# Patient Record
Sex: MALE | Race: WHITE | NOT HISPANIC OR LATINO | Employment: OTHER | URBAN - METROPOLITAN AREA
[De-identification: names, ages, dates, MRNs, and addresses within clinical notes are randomized per-mention and may not be internally consistent; named-entity substitution may affect disease eponyms.]

---

## 2017-08-23 ENCOUNTER — TRANSCRIBE ORDERS (OUTPATIENT)
Dept: ADMINISTRATIVE | Facility: HOSPITAL | Age: 67
End: 2017-08-23

## 2017-08-23 DIAGNOSIS — Z72.0 TOBACCO ABUSE: Primary | ICD-10-CM

## 2017-08-31 ENCOUNTER — HOSPITAL ENCOUNTER (OUTPATIENT)
Dept: RADIOLOGY | Age: 67
Discharge: HOME | End: 2017-08-31
Payer: COMMERCIAL

## 2017-08-31 DIAGNOSIS — Z72.0 TOBACCO ABUSE: ICD-10-CM

## 2017-08-31 PROCEDURE — 76775 US EXAM ABDO BACK WALL LIM: CPT

## 2018-08-01 ENCOUNTER — OFFICE VISIT (OUTPATIENT)
Dept: FAMILY MEDICINE CLINIC | Facility: CLINIC | Age: 68
End: 2018-08-01
Payer: MEDICARE

## 2018-08-01 VITALS
RESPIRATION RATE: 18 BRPM | WEIGHT: 202.2 LBS | SYSTOLIC BLOOD PRESSURE: 140 MMHG | TEMPERATURE: 96.2 F | DIASTOLIC BLOOD PRESSURE: 90 MMHG | HEART RATE: 72 BPM | HEIGHT: 71 IN | BODY MASS INDEX: 28.31 KG/M2

## 2018-08-01 DIAGNOSIS — E78.5 HYPERLIPIDEMIA, UNSPECIFIED HYPERLIPIDEMIA TYPE: ICD-10-CM

## 2018-08-01 DIAGNOSIS — R73.9 HYPERGLYCEMIA: ICD-10-CM

## 2018-08-01 DIAGNOSIS — J30.9 ALLERGIC RHINITIS, UNSPECIFIED SEASONALITY, UNSPECIFIED TRIGGER: Primary | Chronic | ICD-10-CM

## 2018-08-01 LAB
EST. AVERAGE GLUCOSE BLD GHB EST-MCNC: 103 MG/DL
HBA1C MFR BLD: 5.2 % (ref 4.2–6.3)

## 2018-08-01 PROCEDURE — G0403 EKG FOR INITIAL PREVENT EXAM: HCPCS | Performed by: FAMILY MEDICINE

## 2018-08-01 PROCEDURE — 83036 HEMOGLOBIN GLYCOSYLATED A1C: CPT | Performed by: FAMILY MEDICINE

## 2018-08-01 PROCEDURE — 36415 COLL VENOUS BLD VENIPUNCTURE: CPT | Performed by: FAMILY MEDICINE

## 2018-08-01 PROCEDURE — G0402 INITIAL PREVENTIVE EXAM: HCPCS | Performed by: FAMILY MEDICINE

## 2018-08-01 PROCEDURE — 99214 OFFICE O/P EST MOD 30 MIN: CPT | Performed by: FAMILY MEDICINE

## 2018-08-01 RX ORDER — LOVASTATIN 40 MG/1
40 TABLET ORAL DAILY
Refills: 3 | COMMUNITY
Start: 2018-06-07 | End: 2018-11-28 | Stop reason: SDUPTHER

## 2018-08-01 NOTE — PROGRESS NOTES
Medicare Annual Wellness Visit  [facility] - [facility]  NAME: Livier Vaz SEX: male : 1950    HPI:  Livier Vaz is a 78 y o  male who presents to clinic today for Annual Wellness Visit    Last Medicare wellness visit information was reviewed, patient interviewed and updates made to the record today if appropriate  PATIENT CARE TEAM:  Patient Care Team:  Elvia Nicolas MD as PCP - General    CURRENT OUTSIDE HEALTH CARE PROVIDERS: eyes/ dental/ skin     PAST MEDICAL HISTORY:  No past medical history on file  PAST SURGICAL HISTORY:  Past Surgical History:   Procedure Laterality Date    COLONOSCOPY W/ POLYPECTOMY  2004    HAND SURGERY Bilateral 2008    DUPUYTRENS CONTRACTURE SURGICAL RELEASE       PROBLEM LIST:  Patient Active Problem List   Diagnosis    HLD (hyperlipidemia)    Allergic rhinitis    Hyperglycemia       FAMILY HISTORY:  Family History   Problem Relation Age of Onset    Coronary artery disease Mother     Cirrhosis Father     Diabetes type II Family     Heart disease Family        SOCIAL HISTORY:  Livier Vaz  reports that he has been smoking  He uses smokeless tobacco  He reports that he drinks alcohol  He reports that he does not use drugs  ALLERGIES:  No Known Allergies    CURRENT MEDICATIONS:  Current Outpatient Prescriptions   Medication Sig Dispense Refill    lovastatin (MEVACOR) 40 MG tablet Take 40 mg by mouth daily  3     No current facility-administered medications for this visit  IMMUNIZATIONS:  Immunization History   Administered Date(s) Administered    Pneumococcal Conjugate 13-Valent 08/15/2016    Pneumococcal Polysaccharide PPV23 2017       HEALTH MAINTENANCE:  Depression Screen performed (PHQ2/9 as appropriate)   Colon Cancer/ Breast Cancer/ Cevical Cancer/ Prostate Cancer screenings reviewed           PATIENT HEALTH RISK ASSESSMENT (HRA):  Cognitive assessment performed  AAA assessment performed as indicated  Osteoporosis screening reviewed  Fall risk assessment performed (Getup and go tool)  Diet/Exercise and Fluid Intake reviewed as appropriate  ADL / urinary continence reviewed  ADVANCED DIRECTIVE DISCUSSED/ DOCUMENTS PROVIDED AS APPROPRIATE  VITALS:  /90 (BP Location: Left arm, Patient Position: Sitting, Cuff Size: Adult)   Pulse 72   Temp (!) 96 2 °F (35 7 °C)   Resp 18   Ht 5' 11 26" (1 81 m)   Wt 91 7 kg (202 lb 3 2 oz)   BMI 28 00 kg/m²         ASSESSMENT/PLAN:  AWV / ADULT PATIENT    MEDICARE WELLNESS COUNSELING/DISCUSSION:  See Patient Plan      Do Littlejohn MD  [id number] [facility]

## 2018-08-01 NOTE — PROGRESS NOTES
Assessment/Plan:    No problem-specific Assessment & Plan notes found for this encounter  Diagnoses and all orders for this visit:    Allergic rhinitis, unspecified seasonality, unspecified trigger    Hyperlipidemia, unspecified hyperlipidemia type  -     Hemoglobin A1C    Hyperglycemia  -     Hemoglobin A1C  -     POCT ECG    Other orders  -     lovastatin (MEVACOR) 40 MG tablet; Take 40 mg by mouth daily          Subjective:      Patient ID: Cici Roberto is a 78 y o  male  Chavezjannet Cloud comes for his annual visit  Since last time no hospital stays or emergency visits  Medications are the same and having no side effects  No new concerns    Patient also here for Welcome to Medicare evaluation  The following portions of the patient's history were reviewed and updated as appropriate: allergies, current medications, past family history, past medical history, past social history, past surgical history and problem list     Review of Systems   Constitutional: Negative for activity change and appetite change  HENT: Negative for trouble swallowing  Eyes: Negative for visual disturbance  Respiratory: Negative for cough and shortness of breath  Cardiovascular: Negative for chest pain, palpitations and leg swelling  Gastrointestinal: Negative for abdominal pain and blood in stool  Endocrine: Negative for polyuria  Genitourinary: Negative for difficulty urinating and hematuria  Skin: Negative for rash  Neurological: Negative for dizziness  Psychiatric/Behavioral: Negative for behavioral problems  Objective:  Vitals:    08/01/18 0824   BP: 140/90   BP Location: Left arm   Patient Position: Sitting   Cuff Size: Adult   Pulse: 72   Resp: 18   Temp: (!) 96 2 °F (35 7 °C)   Weight: 91 7 kg (202 lb 3 2 oz)   Height: 5' 11 26" (1 81 m)      Physical Exam   Constitutional: He appears well-developed and well-nourished  HENT:   Head: Normocephalic and atraumatic     Eyes: Conjunctivae are normal    Neck: Neck supple  No thyromegaly present  Cardiovascular: Normal rate, regular rhythm, normal heart sounds and intact distal pulses  No murmur heard  Pulmonary/Chest: Effort normal and breath sounds normal  No respiratory distress  Musculoskeletal: He exhibits no edema  Lymphadenopathy:     He has no cervical adenopathy  Skin: Skin is warm and dry  Psychiatric: He has a normal mood and affect   His behavior is normal

## 2018-08-01 NOTE — PATIENT INSTRUCTIONS
IF YOU HAVE NEVER HAD A TDAP SHOT (TETANUS/DIPHTHERIA/PERTUSSIS)  TALK TO YOUR PHARMACIST ABOUT GETTING ONE : GOOD FOR 10 YRS:ESPECIALLY IMPORTATN IF YOU HAVE YOUNG CHILDREN OR GRANDCHILDREN    GET A YEARLY FLU SHOT IN THE FALL : IF OVER 65 GET "HIGH DOSE"     ASK PHARMACIST ABOUT "SHINGRIX" THE NEW SHINGLES VACCINE IF OVER AGE 50    Continue your excellent exercise level and maintain current weight

## 2018-09-14 ENCOUNTER — OFFICE VISIT (OUTPATIENT)
Dept: FAMILY MEDICINE CLINIC | Facility: CLINIC | Age: 68
End: 2018-09-14
Payer: MEDICARE

## 2018-09-14 VITALS
HEIGHT: 71 IN | DIASTOLIC BLOOD PRESSURE: 80 MMHG | RESPIRATION RATE: 20 BRPM | SYSTOLIC BLOOD PRESSURE: 150 MMHG | WEIGHT: 205 LBS | TEMPERATURE: 97 F | HEART RATE: 80 BPM | BODY MASS INDEX: 28.7 KG/M2

## 2018-09-14 DIAGNOSIS — R05.9 COUGH: Primary | ICD-10-CM

## 2018-09-14 PROCEDURE — 99213 OFFICE O/P EST LOW 20 MIN: CPT | Performed by: FAMILY MEDICINE

## 2018-09-14 RX ORDER — GUAIFENESIN AND CODEINE PHOSPHATE 100; 10 MG/5ML; MG/5ML
SOLUTION ORAL
Qty: 120 ML | Refills: 0 | Status: SHIPPED | OUTPATIENT
Start: 2018-09-14 | End: 2019-08-13 | Stop reason: ALTCHOICE

## 2018-09-14 RX ORDER — AZITHROMYCIN 250 MG/1
250 TABLET, FILM COATED ORAL EVERY 24 HOURS
Qty: 6 TABLET | Refills: 0 | Status: SHIPPED | OUTPATIENT
Start: 2018-09-14 | End: 2018-09-19

## 2018-09-14 RX ORDER — ASPIRIN 81 MG/1
81 TABLET ORAL DAILY
COMMUNITY
End: 2019-08-13

## 2018-09-14 NOTE — PATIENT INSTRUCTIONS
Use a cough medicine for 3-4 days  If your symptoms worsen go right on the antibiotic  If no improvement in 3-4 days start the antibiotic as well

## 2018-09-14 NOTE — PROGRESS NOTES
Assessment/Plan:    No problem-specific Assessment & Plan notes found for this encounter  Diagnoses and all orders for this visit:    Cough  Comments:  HPI  Orders:  -     guaifenesin-codeine (GUAIFENESIN AC) 100-10 MG/5ML liquid; 1-2 TSP Q6HR PRN COUGH  -     azithromycin (ZITHROMAX) 250 mg tablet; Take 1 tablet (250 mg total) by mouth every 24 hours for 5 days TAKE 2 ON THE FIRST DAY THEN ONE DAILY THERE AFTER    Other orders  -     aspirin (ECOTRIN LOW STRENGTH) 81 mg EC tablet; Take 81 mg by mouth daily          Subjective:      Patient ID: Vidhi Aceves is a 80 y o  male  Christninfa Mac comes for discussion of cough  Duration approximately 10 days  Some phlegm is noted also has had some epistaxis intermittently  Some left-sided lower chest pain from the cough  He is not a consistent smoker  Not known to have seasonal allergies all no wheeze reported no fevers or chills able to do routine physical activity  The following portions of the patient's history were reviewed and updated as appropriate: allergies, current medications, past family history, past medical history, past social history, past surgical history and problem list     Review of Systems   Respiratory: Positive for cough and chest tightness  Negative for shortness of breath and wheezing  Gastrointestinal: Negative for diarrhea  Skin: Negative for rash           Objective:  Vitals:    09/14/18 0802   BP: 150/80   BP Location: Left arm   Patient Position: Sitting   Cuff Size: Standard   Pulse: 80   Resp: 20   Temp: (!) 97 °F (36 1 °C)   TempSrc: Temporal   Weight: 93 kg (205 lb)   Height: 5' 11" (1 803 m)      Physical Exam

## 2018-11-28 DIAGNOSIS — E78.5 HYPERLIPIDEMIA, UNSPECIFIED HYPERLIPIDEMIA TYPE: Primary | ICD-10-CM

## 2018-11-28 RX ORDER — LOVASTATIN 40 MG/1
40 TABLET ORAL DAILY
Qty: 90 TABLET | Refills: 3 | Status: SHIPPED | OUTPATIENT
Start: 2018-11-28 | End: 2019-12-06 | Stop reason: SDUPTHER

## 2019-08-13 ENCOUNTER — OFFICE VISIT (OUTPATIENT)
Dept: FAMILY MEDICINE CLINIC | Facility: CLINIC | Age: 69
End: 2019-08-13
Payer: MEDICARE

## 2019-08-13 VITALS
TEMPERATURE: 97.3 F | HEART RATE: 72 BPM | OXYGEN SATURATION: 98 % | BODY MASS INDEX: 28 KG/M2 | DIASTOLIC BLOOD PRESSURE: 90 MMHG | WEIGHT: 200 LBS | SYSTOLIC BLOOD PRESSURE: 130 MMHG | HEIGHT: 71 IN

## 2019-08-13 DIAGNOSIS — E78.5 HYPERLIPIDEMIA, UNSPECIFIED HYPERLIPIDEMIA TYPE: ICD-10-CM

## 2019-08-13 DIAGNOSIS — J30.9 ALLERGIC RHINITIS, UNSPECIFIED SEASONALITY, UNSPECIFIED TRIGGER: Chronic | ICD-10-CM

## 2019-08-13 DIAGNOSIS — Z12.11 ENCOUNTER FOR SCREENING COLONOSCOPY: Primary | ICD-10-CM

## 2019-08-13 DIAGNOSIS — R73.9 HYPERGLYCEMIA: ICD-10-CM

## 2019-08-13 PROBLEM — Z00.00 MEDICARE ANNUAL WELLNESS VISIT, SUBSEQUENT: Chronic | Status: ACTIVE | Noted: 2019-08-13

## 2019-08-13 LAB
EST. AVERAGE GLUCOSE BLD GHB EST-MCNC: 103 MG/DL
HBA1C MFR BLD: 5.2 % (ref 4.2–6.3)

## 2019-08-13 PROCEDURE — 99214 OFFICE O/P EST MOD 30 MIN: CPT | Performed by: FAMILY MEDICINE

## 2019-08-13 PROCEDURE — 36415 COLL VENOUS BLD VENIPUNCTURE: CPT | Performed by: FAMILY MEDICINE

## 2019-08-13 PROCEDURE — 83036 HEMOGLOBIN GLYCOSYLATED A1C: CPT | Performed by: FAMILY MEDICINE

## 2019-08-13 PROCEDURE — G0439 PPPS, SUBSEQ VISIT: HCPCS | Performed by: FAMILY MEDICINE

## 2019-08-13 NOTE — PATIENT INSTRUCTIONS
WE RECOMMEND GETTING THE 2- DOSE SHINGLES VACCINE ([address]) FROM YOUR PHARMACY  CHECK WITH THEM ON THE COST  YOU SHOULD HAVE THIS IF OVER AGE 48, EVEN IF YOU HAVE HAD THE ORIGINAL VACCINE (ZOSTRIX)  Call Dr Gamal Tyler : [phone] for colon exam this year   Consider eliminating the aspirin as the risk benefit ratio probably favors stopping it  Obesity   AMBULATORY CARE:   Obesity  is when your body mass index (BMI) is greater than 30  Your healthcare provider will use your height and weight to measure your BMI  The risks of obesity include  many health problems, such as injuries or physical disability  You may need tests to check for the following:  · Diabetes     · High blood pressure or high cholesterol     · Heart disease     · Gallbladder or liver disease     · Cancer of the colon, breast, prostate, liver, or kidney     · Sleep apnea     · Arthritis or gout  Seek care immediately if:   · You have a severe headache, confusion, or difficulty speaking  · You have weakness on one side of your body  · You have chest pain, sweating, or shortness of breath  Contact your healthcare provider if:   · You have symptoms of gallbladder or liver disease, such as pain in your upper abdomen  · You have knee or hip pain and discomfort while walking  · You have symptoms of diabetes, such as intense hunger and thirst, and frequent urination  · You have symptoms of sleep apnea, such as snoring or daytime sleepiness  · You have questions or concerns about your condition or care  Treatment for obesity  focuses on helping you lose weight to improve your health  Even a small decrease in BMI can reduce the risk for many health problems  Your healthcare provider will help you set a weight-loss goal   · Lifestyle changes  are the first step in treating obesity  These include making healthy food choices and getting regular physical activity   Your healthcare provider may suggest a weight-loss program that involves coaching, education, and therapy  · Medicine  may help you lose weight when it is used with a healthy diet and physical activity  · Surgery  can help you lose weight if you are very obese and have other health problems  There are several types of weight-loss surgery  Ask your healthcare provider for more information  Be successful losing weight:   · Set small, realistic goals  An example of a small goal is to walk for 20 minutes 5 days a week  Anther goal is to lose 5% of your body weight  · Tell friends, family members, and coworkers about your goals  and ask for their support  Ask a friend to lose weight with you, or join a weight-loss support group  · Identify foods or triggers that may cause you to overeat , and find ways to avoid them  Remove tempting high-calorie foods from your home and workplace  Place a bowl of fresh fruit on your kitchen counter  If stress causes you to eat, then find other ways to cope with stress  · Keep a diary to track what you eat and drink  Also write down how many minutes of physical activity you do each day  Weigh yourself once a week and record it in your diary  Eating changes: You will need to eat 500 to 1,000 fewer calories each day than you currently eat to lose 1 to 2 pounds a week  The following changes will help you cut calories:  · Eat smaller portions  Use small plates, no larger than 9 inches in diameter  Fill your plate half full of fruits and vegetables  Measure your food using measuring cups until you know what a serving size looks like  · Eat 3 meals and 1 or 2 snacks each day  Plan your meals in advance  Natalia More and eat at home most of the time  Eat slowly  · Eat fruits and vegetables at every meal   They are low in calories and high in fiber, which makes you feel full  Do not add butter, margarine, or cream sauce to vegetables  Use herbs to season steamed vegetables  · Eat less fat and fewer fried foods    Eat more baked or grilled chicken and fish  These protein sources are lower in calories and fat than red meat  Limit fast food  Dress your salads with olive oil and vinegar instead of bottled dressing  · Limit the amount of sugar you eat  Do not drink sugary beverages  Limit alcohol  Activity changes:  Physical activity is good for your body in many ways  It helps you burn calories and build strong muscles  It decreases stress and depression, and improves your mood  It can also help you sleep better  Talk to your healthcare provider before you begin an exercise program   · Exercise for at least 30 minutes 5 days a week  Start slowly  Set aside time each day for physical activity that you enjoy and that is convenient for you  It is best to do both weight training and an activity that increases your heart rate, such as walking, bicycling, or swimming  · Find ways to be more active  Do yard work and housecleaning  Walk up the stairs instead of using elevators  Spend your leisure time going to events that require walking, such as outdoor festivals or fairs  This extra physical activity can help you lose weight and keep it off  Follow up with your healthcare provider as directed: You may need to meet with a dietitian  Write down your questions so you remember to ask them during your visits  © 2017 [address] Information is for End User's use only and may not be sold, redistributed or otherwise used for commercial purposes  All illustrations and images included in CareNotes® are the copyrighted property of [employer]  or Fredy Martinez  The above information is an  only  It is not intended as medical advice for individual conditions or treatments  Talk to your doctor, nurse or pharmacist before following any medical regimen to see if it is safe and effective for you  Urinary Incontinence   WHAT YOU NEED TO KNOW:   What is urinary incontinence?   Urinary incontinence (UI) is when you lose control of your bladder  What causes UI? UI occurs because your bladder cannot store or empty urine properly  The following are the most common types of UI:  · Stress incontinence  is when you leak urine due to increased bladder pressure  This may happen when you cough, sneeze, or exercise  · Urge incontinence  is when you feel the need to urinate right away and leak urine accidentally  · Mixed incontinence  is when you have both stress and urge UI  What are the signs and symptoms of UI?   · You feel like your bladder does not empty completely when you urinate  · You urinate often and need to urinate immediately  · You leak urine when you sleep, or you wake up with the urge to urinate  · You leak urine when you cough, sneeze, exercise, or laugh  How is UI diagnosed? Your healthcare provider will ask how often you leak urine and whether you have stress or urge symptoms  Tell him which medicines you take, how often you urinate, and how much liquid you drink each day  You may need any of the following tests:  · Urine tests  may show infection or kidney function  · A pelvic exam  may be done to check for blockages  A pelvic exam will also show if your bladder, uterus, or other organs have moved out of place  · An x-ray, ultrasound, or CT  may show problems with parts of your urinary system  You may be given contrast liquid to help your organs show up better in the pictures  Tell the healthcare provider if you have ever had an allergic reaction to contrast liquid  Do not enter the MRI room with anything metal  Metal can cause serious injury  Tell the healthcare provider if you have any metal in or on your body  · A bladder scan  will show how much urine is left in your bladder after you urinate  You will be asked to urinate and then healthcare providers will use a small ultrasound machine to check the urine left in your bladder      · Cystometry  is used to check the function of your urinary system  Your healthcare provider checks the pressure in your bladder while filling it with fluid  Your bladder pressure may also be tested when your bladder is full and while you urinate  How is UI treated? · Medicines  can help strengthen your bladder control  · Electrical stimulation  is used to send a small amount of electrical energy to your pelvic floor muscles  This helps control your bladder function  Electrodes may be placed outside your body or in your rectum  For women, the electrodes may be placed in the vagina  · A bulking agent  may be injected into the wall of your urethra to make it thicker  This helps keep your urethra closed and decreases urine leakage  · Devices  such as a clamp, pessary, or tampon may help stop urine leaks  Ask your healthcare provider for more information about these and other devices  · Surgery  may be needed if other treatments do not work  Several types of surgery can help improve your bladder control  Ask your healthcare provider for more information about the surgery you may need  How can I manage my symptoms? · Do pelvic muscle exercises often  Your pelvic muscles help you stop urinating  Squeeze these muscles tight for 5 seconds, then relax for 5 seconds  Gradually work up to squeezing for 10 seconds  Do 3 sets of 15 repetitions a day, or as directed  This will help strengthen your pelvic muscles and improve bladder control  · A catheter  may be used to help empty your bladder  A catheter is a tiny, plastic tube that is put into your bladder to drain your urine  Your healthcare provider may tell you to use a catheter to prevent your bladder from getting too full and leaking urine  · Keep a UI record  Write down how often you leak urine and how much you leak  Make a note of what you were doing when you leaked urine  · Train your bladder    Go to the bathroom at set times, such as every 2 hours, even if you do not feel the urge to go  You can also try to hold your urine when you feel the urge to go  For example, hold your urine for 5 minutes when you feel the urge to go  As that becomes easier, hold your urine for 10 minutes  · Drink liquids as directed  Ask your healthcare provider how much liquid to drink each day and which liquids are best for you  You may need to limit the amount of liquid you drink to help control your urine leakage  Limit or do not have drinks that contain caffeine or alcohol  Do not drink any liquid right before you go to bed  · Prevent constipation  Eat a variety of high-fiber foods  Good examples are high-fiber cereals, beans, vegetables, and whole-grain breads  Prune juice may help make your bowel movement softer  Walking is the best way to trigger your intestines to have a bowel movement  · Exercise regularly and maintain a healthy weight  Ask your healthcare provider how much you should weigh and about the best exercise plan for you  Weight loss and exercise will decrease pressure on your bladder and help you control your leakage  Ask him to help you create a weight loss plan if you are overweight  When should I seek immediate care? · You have severe pain  · You are confused or cannot think clearly  When should I contact my healthcare provider? · You have a fever  · You see blood in your urine  · You have pain when you urinate  · You have new or worse pain, even after treatment  · Your mouth feels dry or you have vision changes  · Your urine is cloudy or smells bad  · You have questions or concerns about your condition or care  CARE AGREEMENT:   You have the right to help plan your care  Learn about your health condition and how it may be treated  Discuss treatment options with your caregivers to decide what care you want to receive  You always have the right to refuse treatment  The above information is an  only   It is not intended as medical advice for individual conditions or treatments  Talk to your doctor, nurse or pharmacist before following any medical regimen to see if it is safe and effective for you  © 2017 [address] Information is for End User's use only and may not be sold, redistributed or otherwise used for commercial purposes  All illustrations and images included in CareNotes® are the copyrighted property of [employer]  or Fredy Martinez  Cigarette Smoking and Your Health   AMBULATORY CARE:   Risks to your health if you smoke:  Nicotine and other chemicals found in tobacco damage every cell in your body  Even if you are a light smoker, you have an increased risk for cancer, heart disease, and lung disease  If you are pregnant or have diabetes, smoking increases your risk for complications  Benefits to your health if you stop smoking:   · You decrease respiratory symptoms such as coughing, wheezing, and shortness of breath  · You reduce your risk for cancers of the lung, mouth, throat, kidney, bladder, pancreas, stomach, and cervix  If you already have cancer, you increase the benefits of chemotherapy  You also reduce your risk for cancer returning or a second cancer from developing  · You reduce your risk for heart disease, blood clots, heart attack, and stroke  · You reduce your risk for lung infections, and diseases such as pneumonia, asthma, chronic bronchitis, and emphysema  · Your circulation improves  More oxygen can be delivered to your body  If you have diabetes, you lower your risk for complications, such as kidney, artery, and eye diseases  You also lower your risk for nerve damage  Nerve damage can lead to amputations, poor vision, and blindness  · You improve your body's ability to heal and to fight infections  Benefits to the health of others if you stop smoking:  Tobacco is harmful to nonsmokers who breathe in your secondhand smoke   The following are ways the health of others around you may improve when you stop smoking:  · You lower the risks for lung cancer and heart disease in nonsmoking adults  · If you are pregnant, you lower the risk for miscarriage, early delivery, low birth weight, and stillbirth  You also lower your baby's risk for SIDS, obesity, developmental delay, and neurobehavioral problems, such as ADHD  · If you have children, you lower their risk for ear infections, colds, pneumonia, bronchitis, and asthma  For more information and support to stop smoking:   · [employer]  Phone: 6- 852 - [phone]  Web Address: www [employer]  Follow up with your healthcare provider as directed:  Write down your questions so you remember to ask them during your visits  © 2017 [address] Information is for End User's use only and may not be sold, redistributed or otherwise used for commercial purposes  All illustrations and images included in CareNotes® are the copyrighted property of A D A M , Inc  or Fredy Martinez  The above information is an  only  It is not intended as medical advice for individual conditions or treatments  Talk to your doctor, nurse or pharmacist before following any medical regimen to see if it is safe and effective for you  Fall Prevention   WHAT YOU NEED TO KNOW:   What is fall prevention? Fall prevention includes ways to make your home and other areas safer  It also includes ways you can move more carefully to prevent a fall  What increases my risk for falls?    · Lack of vitamin D    · Not getting enough sleep each night    · Trouble walking or keeping your balance, or foot problems    · Health conditions that cause changes in your blood pressure, vision, or muscle strength and coordination    · Medicines that make you dizzy, weak, or sleepy    · Problems seeing clearly    · Shoes that have high heels or are not supportive    · Tripping hazards, such as items left on the floor, no handrails on the stairs, or broken steps  How can I help protect myself from falls? · Stand or sit up slowly  This may help you keep your balance and prevent falls  If you need to get up during the night, sit up first  Be sure you are fully awake before you stand  Turn on the light before you start walking  Go slowly in case you are still sleepy  Make sure you will not trip over any pets sleeping in the bedroom  · Use assistive devices as directed  Your healthcare provider may suggest that you use a cane or walker to help you keep your balance  You may need to have grab bars put in your bathroom near the toilet or in the shower  · Wear shoes that fit well and have soles that   Wear shoes both inside and outside  Use slippers with good   Do not wear shoes with high heels  · Wear a personal alarm  This is a device that allows you to call 911 if you fall and need help  Ask your healthcare provider for more information  · Stay active  Exercise can help strengthen your muscles and improve your balance  Your healthcare provider may recommend water aerobics or walking  He or she may also recommend physical therapy to improve your coordination  Never start an exercise program without talking to your healthcare provider first      · Manage medical conditions  Keep all appointments with your healthcare providers  Visit your eye doctor as directed  How can I make my home safer? · Add items to prevent falls in the bathroom  Put nonslip strips on your bath or shower floor to prevent you from slipping  Use a bath mat if you do not have carpet in the bathroom  This will prevent you from falling when you step out of the bath or shower  Use a shower seat so you do not need to stand while you shower  Sit on the toilet or a chair in your bathroom to dry yourself and put on clothing  This will prevent you from losing your balance from drying or dressing yourself while you are standing  · Keep paths clear  Remove books, shoes, and other objects from walkways and stairs  Place cords for telephones and lamps out of the way so that you do not need to walk over them  Tape them down if you cannot move them  Remove small rugs  If you cannot remove a rug, secure it with double-sided tape  This will prevent you from tripping  · Install bright lights in your home  Use night lights to help light paths to the bathroom or kitchen  Always turn on the light before you start walking  · Keep items you use often on shelves within reach  Do not use a step stool to help you reach an item  · Paint or place reflective tape on the edges of your stairs  This will help you see the stairs better  Call 911 or have someone else call if:   · You have fallen and are unconscious  · You have fallen and cannot move part of your body  Contact your healthcare provider if:   · You have fallen and have pain or a headache  · You have questions or concerns about your condition or care  CARE AGREEMENT:   You have the right to help plan your care  Learn about your health condition and how it may be treated  Discuss treatment options with your caregivers to decide what care you want to receive  You always have the right to refuse treatment  The above information is an  only  It is not intended as medical advice for individual conditions or treatments  Talk to your doctor, nurse or pharmacist before following any medical regimen to see if it is safe and effective for you  © 2017 [address] Information is for End User's use only and may not be sold, redistributed or otherwise used for commercial purposes  All illustrations and images included in CareNotes® are the copyrighted property of A D A M , Inc  or Fredy Martinez  Advance Directives   WHAT YOU NEED TO KNOW:   What are advance directives? Advance directives are legal documents that state your wishes and plans for medical care   These plans are made ahead of time in case you lose your ability to make decisions for yourself  Advance directives can apply to any medical decision, such as the treatments you want, and if you want to donate organs  What are the types of advance directives? There are many types of advance directives, and each state has rules about how to use them  You may choose a combination of any of the following:  · Living will: This is a written record of the treatment you want  You can also choose which treatments you do not want, which to limit, and which to stop at a certain time  This includes surgery, medicine, IV fluid, and tube feedings  · Durable power of  for healthcare [facility]): This is a written record that states who you want to make healthcare choices for you when you are unable to make them for yourself  This person, called a proxy, is usually a family member or a friend  You may choose more than 1 proxy  · Do not resuscitate (DNR) order:  A DNR order is used in case your heart stops beating or you stop breathing  It is a request not to have certain forms of treatment, such as CPR  A DNR order may be included in other types of advance directives  · Medical directive: This covers the care that you want if you are in a coma, near death, or unable to make decisions for yourself  You can list the treatments you want for each condition  Treatment may include pain medicine, surgery, blood transfusions, dialysis, IV or tube feedings, and a ventilator (breathing machine)  · Values history: This document has questions about your views, beliefs, and how you feel and think about life  This information can help others choose the care that you would choose  Why are advance directives important? An advance directive helps you control your care  Although spoken wishes may be used, it is better to have your wishes written down  Spoken wishes can be misunderstood, or not followed   Treatments may be given even if you do not want them  An advance directive may make it easier for your family to make difficult choices about your care  How do I decide what to put in my advance directives? · Make informed decisions:  Make sure you fully understand treatments or care you may receive  Think about the benefits and problems your decisions could cause for you or your family  Talk to healthcare providers if you have concerns or questions before you write down your wishes  You may also want to talk with your Confucianism or , or a   Check your state laws to make sure that what you put in your advance directive is legal      · Sign all forms:  Sign and date your advance directive when you have finished  You may also need 2 witnesses to sign the forms  Witnesses cannot be your doctor or his staff, your spouse, heirs or beneficiaries, people you owe money to, or your chosen proxy  Talk to your family, proxy, and healthcare providers about your advance directive  Give each person a copy, and keep one for yourself in a place you can get to easily  Do not keep it hidden or locked away  · Review and revise your plans: You can revise your advance directive at any time, as long as you are able to make decisions  Review your plan every year, and when there are changes in your life, or your health  When you make changes, let your family, proxy, and healthcare providers know  Give each a new copy  Where can I find more information? · [employer]  [address] , [address]  Phone: 7- [phone]  Phone: 2- [phone] - [phone]  Web Address: http://www  aafp org  · [address] [facility])  [address] [facility], [address] [facility] , [address] [place] [address]  Phone: [phone]  Phone: [phone]  Web Address: Judith zarco  CARE AGREEMENT:   You have the right to help plan your care   To help with this plan, you must learn about your health condition and treatment options  You must also learn about advance directives and how they are used  Work with your healthcare providers to decide what care will be used to treat you  You always have the right to refuse treatment  The above information is an  only  It is not intended as medical advice for individual conditions or treatments  Talk to your doctor, nurse or pharmacist before following any medical regimen to see if it is safe and effective for you  © 2017 [address]  Information is for End User's use only and may not be sold, redistributed or otherwise used for commercial purposes  All illustrations and images included in CareNotes® are the copyrighted property of A D A M , Inc  or Fredy Martinez

## 2019-08-13 NOTE — PROGRESS NOTES
Assessment and Plan:     Problem List Items Addressed This Visit     None      Visit Diagnoses     Encounter for screening colonoscopy    -  Primary         History of Present Illness:     Patient presents for Medicare Annual Wellness visit    Patient Care Team:  Naomi Patel MD as PCP - General     Problem List:     Patient Active Problem List   Diagnosis    HLD (hyperlipidemia)    Allergic rhinitis    Hyperglycemia      Past Medical and Surgical History:     Past Medical History:   Diagnosis Date    Dupuytren contracture     bilateral     Past Surgical History:   Procedure Laterality Date    COLONOSCOPY W/ POLYPECTOMY  09/23/2004    HAND SURGERY Bilateral 12/2008    DUPUYTRENS CONTRACTURE SURGICAL RELEASE      Family History:     Family History   Problem Relation Age of Onset    Coronary artery disease Mother     Cirrhosis Father     Diabetes type II Family         multiple relatives    Heart disease Family         ASCVD      Social History:     Social History     Tobacco Use   Smoking Status Current Some Day Smoker    Types: Cigars   Smokeless Tobacco Current User   Tobacco Comment    no passive smoke exposure     Social History     Substance and Sexual Activity   Alcohol Use Yes    Frequency: 2-3 times a week     Social History     Substance and Sexual Activity   Drug Use No      Medications and Allergies:     Current Outpatient Medications   Medication Sig Dispense Refill    aspirin (ECOTRIN LOW STRENGTH) 81 mg EC tablet Take 81 mg by mouth daily      lovastatin (MEVACOR) 40 MG tablet Take 1 tablet (40 mg total) by mouth daily 90 tablet 3     No current facility-administered medications for this visit        No Known Allergies   Immunizations:     Immunization History   Administered Date(s) Administered    INFLUENZA 09/26/2018    Influenza Split High Dose Preservative Free IM 09/26/2018    Pneumococcal Conjugate 13-Valent 08/15/2016    Pneumococcal Polysaccharide PPV23 08/23/2017    Td (adult), Unspecified 08/14/2005    Td (adult), adsorbed 08/30/2005    Tdap 08/10/2012    Zoster 12/31/2015      Medicare Screening Tests and Risk Assessments:     Sheyla Lezama is here for his Subsequent Wellness visit  Last Medicare Wellness visit information reviewed, patient interviewed and updates made to the record today  Health Risk Assessment:  Patient rates overall health as very good  Patient feels that their physical health rating is Same  Eyesight was rated as Same  Hearing was rated as Same  Patient feels that their emotional and mental health rating is Same  Pain experienced by patient in the last 7 days has been None  Patient states that he has experienced no weight loss or gain in last 6 months  Emotional/Mental Health:  Patient has been feeling nervous/anxious  PHQ-9 Depression Screening:    Frequency of the following problems over the past two weeks:      1  Little interest or pleasure in doing things: 0 - not at all      2  Feeling down, depressed, or hopeless: 0 - not at all  PHQ-2 Score: 0          Broken Bones/Falls: Fall Risk Assessment:    In the past year, patient has experienced: No history of falling in past year          Bladder/Bowel:  Patient has leaked urine accidently in the last six months  Patient reports no loss of bowel control  Immunizations:  Patient has had a flu vaccination within the last year  Patient has not received a shingles shot  Home Safety:  Patient does not have trouble with stairs inside or outside of their home  Patient currently reports that there are no safety hazards present in home, working smoke alarms, working carbon monoxide detectors  Preventative Screenings:   no colon cancer screen completed, glaucoma eye exam completed, 11/1/2018      Nutrition:  Current diet: Regular with servings of the following:    Medications:  Patient is currently taking over-the-counter supplements  Patient is able to manage medications      Lifestyle Choices:  Patient reports current tobacco use  Patient reports alcohol use  Patient drives a vehicle  Patient wears seat belt  Current level of exercise of physical activity described by patient as: 3 -4 times a week for 45 minutes to an hour   Activities of Daily Living:  Can get out of bed by his or her self, able to dress self, able to make own meals, able to do own shopping, able to bathe self, can do own laundry/housekeeping, can manage own money, pay bills and track expenses    Previous Hospitalizations:  No hospitalization or ED visit in past 12 months        Advanced Directives:  Patient has decided on a power of   Patient has spoken to designated power of   Patient has not completed advanced directive

## 2019-08-13 NOTE — PROGRESS NOTES
Assessment/Plan:    No problem-specific Assessment & Plan notes found for this encounter  Diagnoses and all orders for this visit:    Encounter for screening colonoscopy  -     Ambulatory referral to Gastroenterology; Future    Hyperglycemia    Hyperlipidemia, unspecified hyperlipidemia type    Allergic rhinitis, unspecified seasonality, unspecified trigger          Subjective:      Patient ID: Gonsalo Mcrae is a 77 y o  male  PATIENT RETURNS FOR FOLLOW-UP OF CHRONIC MEDICAL CONDITIONS  NO HOSPITAL STAYS OR EMERGENCY VISITS RECENTLY  MEDS WERE REVIEWED AND NO SIDE EFFECTS  NO NEW ISSUES  UNLESS NOTED BELOW  NO NEW MEDICAL PROVIDER REPORTED  THE CHRONIC DISEASES LISTED ABOVE ARE STABLE AND UNCHANGED/ THE PLAN OF CARE FOR THOSE WILL REMAIN UNCHANGED UNLESS NOTED BELOW  AWV/  Sub     Derm ck OK yesterday       The following portions of the patient's history were reviewed and updated as appropriate: allergies, current medications, past family history, past medical history, past social history, past surgical history and problem list     Review of Systems   Constitutional: Negative for activity change and appetite change  HENT: Negative for trouble swallowing  Eyes: Negative for visual disturbance  Respiratory: Negative for cough and shortness of breath  Cardiovascular: Negative for chest pain, palpitations and leg swelling  Gastrointestinal: Negative for abdominal pain and blood in stool  Endocrine: Negative for polyuria  Genitourinary: Negative for difficulty urinating and hematuria  Skin: Negative for rash  Neurological: Negative for dizziness  Psychiatric/Behavioral: Negative for behavioral problems           Objective:  Vitals:    08/13/19 1105   BP: 130/90   BP Location: Left arm   Patient Position: Sitting   Cuff Size: Adult   Pulse: 72   Temp: (!) 97 3 °F (36 3 °C)   TempSrc: Temporal   SpO2: 98%   Weight: 90 7 kg (200 lb)   Height: 5' 11" (1 803 m)      Physical Exam Constitutional: He appears well-developed and well-nourished  HENT:   Head: Normocephalic and atraumatic  Eyes: Conjunctivae are normal    Neck: Neck supple  No thyromegaly present  Cardiovascular: Normal rate, regular rhythm, normal heart sounds and intact distal pulses  No murmur heard  Pulmonary/Chest: Effort normal and breath sounds normal  No respiratory distress  Musculoskeletal: He exhibits no edema  Lymphadenopathy:     He has no cervical adenopathy  Skin: Skin is warm and dry  Psychiatric: He has a normal mood and affect   His behavior is normal           Assessment and Plan:     Problem List Items Addressed This Visit        Respiratory    Allergic rhinitis (Chronic)       Other    HLD (hyperlipidemia)    Hyperglycemia      Other Visit Diagnoses     Encounter for screening colonoscopy    -  Primary         History of Present Illness:     Patient presents for Medicare Annual Wellness visit    Patient Care Team:  Elvia Nicolas MD as PCP - General     Problem List:     Patient Active Problem List   Diagnosis    HLD (hyperlipidemia)    Allergic rhinitis    Hyperglycemia    Medicare annual wellness visit, subsequent      Past Medical and Surgical History:     Past Medical History:   Diagnosis Date    Dupuytren contracture     bilateral     Past Surgical History:   Procedure Laterality Date    COLONOSCOPY W/ POLYPECTOMY  09/23/2004    HAND SURGERY Bilateral 12/2008    [place]  CONTRACTURE SURGICAL RELEASE      Family History:     Family History   Problem Relation Age of Onset    Coronary artery disease Mother     Cirrhosis Father     Diabetes type II Family         multiple relatives    Heart disease Family         ASCVD      Social History:     Social History     Tobacco Use   Smoking Status Current Some Day Smoker    Types: Cigars   Smokeless Tobacco Current User   Tobacco Comment    no passive smoke exposure     Social History     Substance and Sexual Activity   Alcohol Use Yes    Frequency: 2-3 times a week     Social History     Substance and Sexual Activity   Drug Use No      Medications and Allergies:     Current Outpatient Medications   Medication Sig Dispense Refill    lovastatin (MEVACOR) 40 MG tablet Take 1 tablet (40 mg total) by mouth daily 90 tablet 3     No current facility-administered medications for this visit  No Known Allergies   Immunizations:     Immunization History   Administered Date(s) Administered    INFLUENZA 09/26/2018    Influenza Split High Dose Preservative Free IM 09/26/2018    Pneumococcal Conjugate 13-Valent 08/15/2016    Pneumococcal Polysaccharide PPV23 08/23/2017    Td (adult), Unspecified 08/14/2005    Td (adult), adsorbed 08/30/2005    Tdap 08/10/2012    Zoster 12/31/2015      Medicare Screening Tests and Risk Assessments:     Last Medicare Wellness visit information reviewed, patient interviewed, no change since last AWV  Preventative Screening/Counseling:      Cardiovascular:      General: Screening Current          Diabetes:      General: Screening Current          Colorectal Cancer:      General: Screening Current          Prostate Cancer:      General: Screening Not Indicated          Osteoporosis:      General: Screening Not Indicated          AAA:      General: Screening Current          Glaucoma:      General: Screening Current          HIV:      General: Screening Not Indicated          Hepatitis C:      General: Screening Not Indicated        Advanced Directives:   Patient has living will for healthcare, has durable POA for healthcare, patient has an advanced directive       Immunizations:  Patient reviewed and up to date      Shingrix: Risks & Benefits Discussed

## 2019-08-21 ENCOUNTER — TELEPHONE (OUTPATIENT)
Dept: FAMILY MEDICINE CLINIC | Facility: CLINIC | Age: 69
End: 2019-08-21

## 2019-08-21 NOTE — TELEPHONE ENCOUNTER
Pt called and left a message stating that he seen Dr Karsten Ross last week and is due for a colonoscopy  He states his last one was 2014 with Dr Jerman Shah  He states if the information is sent over through open access then he can get the colonoscopy and not have to get another consult done  Dr Jaison Darling office will be faxing over the form to be filled out

## 2019-12-06 DIAGNOSIS — E78.5 HYPERLIPIDEMIA, UNSPECIFIED HYPERLIPIDEMIA TYPE: ICD-10-CM

## 2019-12-06 RX ORDER — LOVASTATIN 40 MG/1
40 TABLET ORAL DAILY
Qty: 90 TABLET | Refills: 3 | Status: SHIPPED | OUTPATIENT
Start: 2019-12-06 | End: 2020-12-03 | Stop reason: SDUPTHER

## 2020-08-04 ENCOUNTER — TELEMEDICINE (OUTPATIENT)
Dept: FAMILY MEDICINE CLINIC | Facility: CLINIC | Age: 70
End: 2020-08-04
Payer: MEDICARE

## 2020-08-04 DIAGNOSIS — R21 RASH: Primary | ICD-10-CM

## 2020-08-04 PROCEDURE — 99213 OFFICE O/P EST LOW 20 MIN: CPT | Performed by: NURSE PRACTITIONER

## 2020-08-04 PROCEDURE — 1160F RVW MEDS BY RX/DR IN RCRD: CPT | Performed by: NURSE PRACTITIONER

## 2020-08-04 PROCEDURE — 4040F PNEUMOC VAC/ADMIN/RCVD: CPT | Performed by: NURSE PRACTITIONER

## 2020-08-04 RX ORDER — CLOTRIMAZOLE AND BETAMETHASONE DIPROPIONATE 10; .64 MG/G; MG/G
CREAM TOPICAL 2 TIMES DAILY
Qty: 30 G | Refills: 0 | Status: SHIPPED | OUTPATIENT
Start: 2020-08-04 | End: 2020-11-09 | Stop reason: ALTCHOICE

## 2020-08-04 NOTE — PROGRESS NOTES
Virtual Regular Visit      Assessment/Plan:    Problem List Items Addressed This Visit     None      Visit Diagnoses     Rash    -  Primary    Relevant Medications    clotrimazole-betamethasone (LOTRISONE) 1-0 05 % cream        Due to area and will treat with lotrisone incase fungal component        Reason for visit is   Chief Complaint   Patient presents with    Virtual Brief Visit    Rash    Virtual Regular Visit        Encounter provider SUSHIL Hong    Provider located at [facility] AT [address] [id number]      Recent Visits  No visits were found meeting these conditions  Showing recent visits within past 7 days and meeting all other requirements     Today's Visits  Date Type Provider Dept   08/04/20 [address] [facility], [address] [facility]   Showing today's visits and meeting all other requirements     Future Appointments  No visits were found meeting these conditions  Showing future appointments within next 150 days and meeting all other requirements        The patient was identified by name and date of birth  Narendra Grimm was informed that this is a telemedicine visit and that the visit is being conducted through [facility] and patient was informed that this is a secure, HIPAA-compliant platform  He agrees to proceed     My office door was closed  No one else was in the room  He acknowledged consent and understanding of privacy and security of the video platform  The patient has agreed to participate and understands they can discontinue the visit at any time  Patient is aware this is a billable service  Subjective  Narendra Grimm is a 77 y o  male started with rash about 10 days ago   Itchy rash  On both sides in front and into back and down into buttock on both sides  NO pain    Never blistered  Thanks may be a llittle better     Sweats a lot    Under waist band      HPI   See above  Past Medical History:   Diagnosis Date    Dupuytren contracture     bilateral       Past Surgical History:   Procedure Laterality Date    COLONOSCOPY W/ POLYPECTOMY  09/23/2004    HAND SURGERY Bilateral 12/2008    DUPUYTRENS CONTRACTURE SURGICAL RELEASE       Current Outpatient Medications   Medication Sig Dispense Refill    lovastatin (MEVACOR) 40 MG tablet Take 1 tablet (40 mg total) by mouth daily 90 tablet 3    clotrimazole-betamethasone (LOTRISONE) 1-0 05 % cream Apply topically 2 (two) times a day 30 g 0     No current facility-administered medications for this visit  No Known Allergies    Review of Systems   Constitutional: Negative for fatigue and fever  Skin: Positive for rash  Neurological: Negative for dizziness and light-headedness  Video Exam    There were no vitals filed for this visit  Physical Exam   Abdominal: Normal appearance  Neurological: He is alert  Skin:             I spent 10 minutes directly with the patient during this visit      Bedshankaruyen Latanyaraegan Larios acknowledges that he has consented to an online visit or consultation  He understands that the online visit is based solely on information provided by him, and that, in the absence of a face-to-face physical evaluation by the physician, the diagnosis he receives is both limited and provisional in terms of accuracy and completeness  This is not intended to replace a full medical face-to-face evaluation by the physician  Harpreet Gentile understands and accepts these terms

## 2020-08-12 ENCOUNTER — OFFICE VISIT (OUTPATIENT)
Dept: FAMILY MEDICINE CLINIC | Facility: CLINIC | Age: 70
End: 2020-08-12
Payer: MEDICARE

## 2020-08-12 VITALS
TEMPERATURE: 97.8 F | DIASTOLIC BLOOD PRESSURE: 96 MMHG | OXYGEN SATURATION: 98 % | HEIGHT: 71 IN | BODY MASS INDEX: 27.9 KG/M2 | WEIGHT: 199.3 LBS | HEART RATE: 84 BPM | SYSTOLIC BLOOD PRESSURE: 150 MMHG

## 2020-08-12 DIAGNOSIS — J30.9 ALLERGIC RHINITIS, UNSPECIFIED SEASONALITY, UNSPECIFIED TRIGGER: Chronic | ICD-10-CM

## 2020-08-12 DIAGNOSIS — E78.5 HYPERLIPIDEMIA, UNSPECIFIED HYPERLIPIDEMIA TYPE: ICD-10-CM

## 2020-08-12 DIAGNOSIS — Z00.00 MEDICARE ANNUAL WELLNESS VISIT, SUBSEQUENT: Chronic | ICD-10-CM

## 2020-08-12 DIAGNOSIS — R73.9 HYPERGLYCEMIA: ICD-10-CM

## 2020-08-12 LAB
CHOLEST SERPL-MCNC: 197 MG/DL (ref 50–200)
HDLC SERPL-MCNC: 50 MG/DL
LDLC SERPL CALC-MCNC: 127 MG/DL (ref 0–100)
NONHDLC SERPL-MCNC: 147 MG/DL
TRIGL SERPL-MCNC: 99 MG/DL

## 2020-08-12 PROCEDURE — 83036 HEMOGLOBIN GLYCOSYLATED A1C: CPT | Performed by: FAMILY MEDICINE

## 2020-08-12 PROCEDURE — 80061 LIPID PANEL: CPT | Performed by: FAMILY MEDICINE

## 2020-08-12 PROCEDURE — 36415 COLL VENOUS BLD VENIPUNCTURE: CPT | Performed by: FAMILY MEDICINE

## 2020-08-12 PROCEDURE — 1123F ACP DISCUSS/DSCN MKR DOCD: CPT | Performed by: FAMILY MEDICINE

## 2020-08-12 PROCEDURE — 4040F PNEUMOC VAC/ADMIN/RCVD: CPT | Performed by: FAMILY MEDICINE

## 2020-08-12 PROCEDURE — 99214 OFFICE O/P EST MOD 30 MIN: CPT | Performed by: FAMILY MEDICINE

## 2020-08-12 PROCEDURE — G0103 PSA SCREENING: HCPCS | Performed by: FAMILY MEDICINE

## 2020-08-12 PROCEDURE — G0439 PPPS, SUBSEQ VISIT: HCPCS | Performed by: FAMILY MEDICINE

## 2020-08-12 PROCEDURE — 1160F RVW MEDS BY RX/DR IN RCRD: CPT | Performed by: FAMILY MEDICINE

## 2020-08-12 NOTE — PATIENT INSTRUCTIONS
Weight Management   AMBULATORY CARE:   Why it is important to manage your weight:  Being overweight increases your risk of health conditions such as heart disease, high blood pressure, type 2 diabetes, and certain types of cancer  It can also increase your risk for osteoarthritis, sleep apnea, and other respiratory problems  Aim for a slow, steady weight loss  Even a small amount of weight loss can lower your risk of health problems  How to lose weight safely:  A safe and healthy way to lose weight is to eat fewer calories and get regular exercise  You can lose up about 1 pound a week by decreasing the number of calories you eat by 500 calories each day  You can decrease calories by eating smaller portion sizes or by cutting out high-calorie foods  Read labels to find out how many calories are in the foods you eat  You can also burn calories with exercise such as walking, swimming, or biking  You will be more likely to keep weight off if you make these changes part of your lifestyle  Healthy meal plan for weight management:  A healthy meal plan includes a variety of foods, contains fewer calories, and helps you stay healthy  A healthy meal plan includes the following:  · Eat whole-grain foods more often  A healthy meal plan should contain fiber  Fiber is the part of grains, fruits, and vegetables that is not broken down by your body  Whole-grain foods are healthy and provide extra fiber in your diet  Some examples of whole-grain foods are whole-wheat breads and pastas, oatmeal, brown rice, and bulgur  · Eat a variety of vegetables every day  Include dark, leafy greens such as spinach, kale, rafat greens, and mustard greens  Eat yellow and orange vegetables such as carrots, sweet potatoes, and winter squash  · Eat a variety of fruits every day  Choose fresh or canned fruit (canned in its own juice or light syrup) instead of juice  Fruit juice has very little or no fiber  · Eat low-fat dairy foods  Drink fat-free (skim) milk or 1% milk  Eat fat-free yogurt and low-fat cottage cheese  Try low-fat cheeses such as mozzarella and other reduced-fat cheeses  · Choose meat and other protein foods that are low in fat  Choose beans or other legumes such as split peas or lentils  Choose fish, skinless poultry (chicken or turkey), or lean cuts of red meat (beef or pork)  Before you cook meat or poultry, cut off any visible fat  · Use less fat and oil  Try baking foods instead of frying them  Add less fat, such as margarine, sour cream, regular salad dressing and mayonnaise to foods  Eat fewer high-fat foods  Some examples of high-fat foods include french fries, doughnuts, ice cream, and cakes  · Eat fewer sweets  Limit foods and drinks that are high in sugar  This includes candy, cookies, regular soda, and sweetened drinks  Ways to decrease calories:   · Eat smaller portions  ¨ Use a small plate with smaller servings  ¨ Do not eat second helpings  ¨ When you eat at a restaurant, ask for a box and place half of your meal in the box before you eat  ¨ Share an entrée with someone else  · Replace high-calorie snacks with healthy, low-calorie snacks  ¨ Choose fresh fruit, vegetables, fat-free rice cakes, or air-popped popcorn instead of potato chips, nuts, or chocolate  ¨ Choose water or calorie-free drinks instead of soda or sweetened drinks  · Eat regular meals  Skipping meals can lead to overeating later in the day  Eat a healthy snack in place of a meal if you do not have time to eat a regular meal      · Do not shop for groceries when you are hungry  You may be more likely to make unhealthy food choices  Take a grocery list of healthy foods and shop after you have eaten  Exercise:  Exercise at least 30 minutes per day on most days of the week  Some examples of exercise include walking, biking, dancing, and swimming   You can also fit in more physical activity by taking the stairs instead of the elevator or parking farther away from stores  Ask your healthcare provider about the best exercise plan for you  Other things to consider as you try to lose weight:   · Be aware of situations that may give you the urge to overeat, such as eating while watching television  Find ways to avoid these situations  For example, read a book, go for a walk, or do crafts  · Meet with a weight loss support group or friends who are also trying to lose weight  This may help you stay motivated to continue working on your weight loss goals  © 2017 [facility] Information is for End User's use only and may not be sold, redistributed or otherwise used for commercial purposes  All illustrations and images included in CareNotes® are the copyrighted property of [employer]  or Fredy Martinez  The above information is an  only  It is not intended as medical advice for individual conditions or treatments  Talk to your doctor, nurse or pharmacist before following any medical regimen to see if it is safe and effective for you  Heart Healthy Diet   AMBULATORY CARE:   A heart healthy diet  is an eating plan low in total fat, unhealthy fats, and sodium (salt)  A heart healthy diet helps decrease your risk for heart disease and stroke  Limit the amount of fat you eat to 25% to 35% of your total daily calories  Limit sodium to less than 2,300 mg each day  Healthy fats:  Healthy fats can help improve cholesterol levels  The risk for heart disease is decreased when cholesterol levels are normal  Choose healthy fats, such as the following:  · Unsaturated fat  is found in foods such as soybean, canola, olive, corn, and safflower oils  It is also found in soft tub margarine that is made with liquid vegetable oil  · Omega-3 fat  is found in certain fish, such as salmon, tuna, and trout, and in walnuts and flaxseed    Unhealthy fats:  Unhealthy fats can cause unhealthy cholesterol levels in your blood and increase your risk of heart disease  Limit unhealthy fats, such as the following:  · Cholesterol  is found in animal foods, such as eggs and lobster, and in dairy products made from whole milk  Limit cholesterol to less than 300 milligrams (mg) each day  You may need to limit cholesterol to 200 mg each day if you have heart disease  · Saturated fat  is found in meats, such as paz and hamburger  It is also found in chicken or turkey skin, whole milk, and butter  Limit saturated fat to less than 7% of your total daily calories  Limit saturated fat to less than 6% if you have heart disease or are at increased risk for it  · Trans fat  is found in packaged foods, such as potato chips and cookies  It is also in hard margarine, some fried foods, and shortening  Avoid trans fats as much as possible    Heart healthy foods and drinks to include:  Ask your dietitian or healthcare provider how many servings to have from each of the following food groups:  · Grains:      ¨ Whole-wheat breads, cereals, and pastas, and brown rice    ¨ Low-fat, low-sodium crackers and chips    · Vegetables:      ¨ Broccoli, green beans, green peas, and spinach    ¨ Collards, kale, and lima beans    ¨ Carrots, sweet potatoes, tomatoes, and peppers    ¨ Canned vegetables with no salt added    · Fruits:      ¨ Bananas, peaches, pears, and pineapple    ¨ Grapes, raisins, and dates    ¨ Oranges, tangerines, grapefruit, orange juice, and grapefruit juice    ¨ Apricots, mangoes, melons, and papaya    ¨ Raspberries and strawberries    ¨ Canned fruit with no added sugar    · Low-fat dairy products:      ¨ Nonfat (skim) milk, 1% milk, and low-fat almond, cashew, or soy milks fortified with calcium    ¨ Low-fat cheese, regular or frozen yogurt, and cottage cheese    · Meats and proteins , such as lean cuts of beef and pork (loin, leg, round), skinless chicken and turkey, legumes, soy products, egg whites, and nuts  Foods and drinks to limit or avoid:  Ask your dietitian or healthcare provider about these and other foods that are high in unhealthy fat, sodium, and sugar:  · Snack or packaged foods , such as frozen dinners, cookies, macaroni and cheese, and cereals with more than 300 mg of sodium per serving    · Canned or dry mixes  for cakes, soups, sauces, or gravies    · Vegetables with added sodium , such as instant potatoes, vegetables with added sauces, or regular canned vegetables    · Other foods high in sodium , such as ketchup, barbecue sauce, salad dressing, pickles, olives, soy sauce, and miso    · High-fat dairy foods  such as whole or 2% milk, cream cheese, or sour cream, and cheeses     · High-fat protein foods  such as high-fat cuts of beef (T-bone steaks, ribs), chicken or turkey with skin, and organ meats, such as liver    · Cured or smoked meats , such as hot dogs, paz, and sausage    · Unhealthy fats and oils , such as butter, stick margarine, shortening, and cooking oils such as coconut or palm oil    · Food and drinks high in sugar , such as soft drinks (soda), sports drinks, sweetened tea, candy, cake, cookies, pies, and doughnuts  Other diet guidelines to follow:   · Eat more foods containing omega-3 fats  Eat fish high in omega-3 fats at least 2 times a week  · Limit alcohol  Too much alcohol can damage your heart and raise your blood pressure  Women should limit alcohol to 1 drink a day  Men should limit alcohol to 2 drinks a day  A drink of alcohol is 12 ounces of beer, 5 ounces of wine, or 1½ ounces of liquor  · Choose low-sodium foods  High-sodium foods can lead to high blood pressure  Add little or no salt to food you prepare  Use herbs and spices in place of salt  · Eat more fiber  to help lower cholesterol levels  Eat at least 5 servings of fruits and vegetables each day  Eat 3 ounces of whole-grain foods each day  Legumes (beans) are also a good source of fiber    Lifestyle guidelines: · Do not smoke  Nicotine and other chemicals in cigarettes and cigars can cause lung and heart damage  Ask your healthcare provider for information if you currently smoke and need help to quit  E-cigarettes or smokeless tobacco still contain nicotine  Talk to your healthcare provider before you use these products  · Exercise regularly  to help you maintain a healthy weight and improve your blood pressure and cholesterol levels  Ask your healthcare provider about the best exercise plan for you  Do not start an exercise program without asking your healthcare provider  Follow up with your healthcare provider as directed:  Write down your questions so you remember to ask them during your visits  © 2017 [address] [employer] Information is for End User's use only and may not be sold, redistributed or otherwise used for commercial purposes  All illustrations and images included in CareNotes® are the copyrighted property of [employer]  or Fredy Martinez  The above information is an  only  It is not intended as medical advice for individual conditions or treatments  Talk to your doctor, nurse or pharmacist before following any medical regimen to see if it is safe and effective for you  Calorie Counting Diet   WHAT YOU NEED TO KNOW:   What is a calorie counting diet? It is a meal plan based on counting calories each day to reach a healthy body weight  You will need to eat fewer calories if you are trying to lose weight  Weight loss may decrease your risk for certain health problems or improve your health if you have health problems  Some of these health problems include heart disease, high blood pressure, and diabetes  What foods should I avoid? Your dietitian will tell you if you need to avoid certain foods based on your body weight and health condition  You may need to avoid high-fat foods if you are at risk for or have heart disease   You may need to eat fewer foods from the breads and starches food group if you have diabetes  How many calories are in foods? The following is a list of foods and drinks with the approximate number of calories in each  Check the food label to find the exact number of calories  A dietitian can tell you how many calories you should have from each food group each day    · Carbohydrate:      ¨ ½ of a 3-inch bagel, 1 slice of bread, or ½ of a hamburger bun or hot dog bun (80)    ¨ 1 (8-inch) flour tortilla or ½ cup of cooked rice (100)    ¨ 1 (6-inch) corn tortilla (80)    ¨ 1 (6-inch) pancake or 1 cup of bran flakes cereal (110)    ¨ ½ cup of cooked cereal (80)    ¨ ½ cup of cooked pasta (85)    ¨ 1 ounce of pretzels (100)    ¨ 3 cups of air-popped popcorn without butter or oil (80)    · Dairy:      ¨ 1 cup of skim or 1% milk (90)    ¨ 1 cup of 2% milk (120)    ¨ 1 cup of whole milk (160)    ¨ 1 cup of 2% chocolate milk (220)    ¨ 1 ounce of low-fat cheese with 3 grams of fat per ounce (70)    ¨ 1 ounce of cheddar cheese (114)    ¨ ½ cup of 1% fat cottage cheese (80)    ¨ 1 cup of plain or sugar-free, fat-free yogurt (90)    · Protein foods:      ¨ 3 ounces of fish (not breaded or fried) (95)    ¨ 3 ounces of breaded, fried fish (195)    ¨ ¾ cup of tuna canned in water (105)    ¨ 3 ounces of chicken breast without skin (105)    ¨ 1 fried chicken breast with skin (350)    ¨ ¼ cup of fat free egg substitute (40)    ¨ 1 large egg (75)    ¨ 3 ounces of lean beef or pork (165)    ¨ 3 ounces of fried pork chop or ham (185)    ¨ ½ cup of cooked dried beans, such as kidney, rudolph, lentils, or navy (115)    ¨ 3 ounces of bologna or lunch meat (225)    ¨ 2 links of breakfast sausage (140)    · Vegetables:      ¨ ½ cup of sliced mushrooms (10)    ¨ 1 cup of salad greens, such as lettuce, spinach, or obdulio (15)    ¨ ½ cup of steamed asparagus (20)    ¨ ½ cup of cooked summer squash, zucchini squash, or green or wax beans (25)    ¨ 1 cup of broccoli or cauliflower florets, or 1 medium tomato (25)    ¨ 1 large raw carrot or ½ cup of cooked carrots (40)    ¨ ? of a medium cucumber or 1 stalk of celery (5)    ¨ 1 small baked potato (160)    ¨ 1 cup of breaded, fried vegetables (230)    · Fruit:      ¨ 1 (6-inch) banana (55)     ¨ ½ of a 4-inch grapefruit (55)    ¨ 15 grapes (60)    ¨ 1 medium orange or apple (70)    ¨ 1 large peach (65)    ¨ 1 cup of fresh pineapple chunks (75)    ¨ 1 cup of melon cubes (50)    ¨ 1¼ cups of whole strawberries (45)    ¨ ½ cup of fruit canned in juice (55)    ¨ ½ cup of fruit canned in heavy syrup (110)    ¨ ?  cup of raisins (130)    ¨ ½ cup of unsweetened fruit juice (60)    ¨ ½ cup of grape, cranberry, or prune juice (90)    · Fat:      ¨ 10 peanuts or 2 teaspoons of peanut butter (55)    ¨ 2 tablespoons of avocado or 1 tablespoon of regular salad dressing (45)    ¨ 2 slices of paz (90)    ¨ 1 teaspoon of oil, such as safflower, canola, corn, or olive oil (45)    ¨ 2 teaspoons of low-fat margarine, or 1 tablespoon of low-fat mayonnaise (50)    ¨ 1 teaspoon of regular margarine (40)    ¨ 1 tablespoon of regular mayonnaise (135)    ¨ 1 tablespoon of cream cheese or 2 tablespoons of low-fat cream cheese (45)    ¨ 2 tablespoons of vegetable shortening (215)    · Dessert and sweets:      ¨ 8 animal crackers or 5 vanilla wafers (80)    ¨ 1 frozen fruit juice bar (80)    ¨ ½ cup of ice milk or low-fat frozen yogurt (90)    ¨ ½ cup of sherbet or sorbet (125)    ¨ ½ cup of sugar-free pudding or custard (60)    ¨ ½ cup of ice cream (140)    ¨ ½ cup of pudding or custard (175)    ¨ 1 (2-inch) square chocolate brownie (185)    · Combination foods:      ¨ Bean burrito made with an 8-inch tortilla, without cheese (275)    ¨ Chicken breast sandwich with lettuce and tomato (325)    ¨ 1 cup of chicken noodle soup (60)    ¨ 1 beef taco (175)    ¨ Regular hamburger with lettuce and tomato (310)    ¨ Regular cheeseburger with lettuce and tomato (410)     ¨ ¼ of a 12-inch cheese pizza (280)    ¨ Fried fish sandwich with lettuce and tomato (425)    ¨ Hot dog and bun (275)    ¨ 1½ cups of macaroni and cheese (310)    ¨ Taco salad with a fried tortilla shell (870)    · Low-calorie foods:      ¨ 1 tablespoon of ketchup or 1 tablespoon of fat free sour cream (15)    ¨ 1 teaspoon of mustard (5)    ¨ ¼ cup of salsa (20)    ¨ 1 large dill pickle (15)    ¨ 1 tablespoon of fat free salad dressing (10)    ¨ 2 teaspoons of low-sugar, light jam or jelly, or 1 tablespoon of sugar-free syrup (15)    ¨ 1 sugar-free popsicle (15)    ¨ 1 cup of club soda, seltzer water, or diet soda (0)  CARE AGREEMENT:   You have the right to help plan your care  Discuss treatment options with your caregivers to decide what care you want to receive  You always have the right to refuse treatment  The above information is an  only  It is not intended as medical advice for individual conditions or treatments  Talk to your doctor, nurse or pharmacist before following any medical regimen to see if it is safe and effective for you  © 2017 [facility] Information is for End User's use only and may not be sold, redistributed or otherwise used for commercial purposes  All illustrations and images included in CareNotes® are the copyrighted property of [employer]  or [employer]  PROPER BLOOD PRESSURE MEASUREMENTS INCLUDE:     SIT IN A STRAIGHT BACK CHAIR WITH YOUR FEET ON THE FLOOR   SIT AT LEAST 5 MINUTES BEFORE CHECKING BP   USE A CUFF THAT IS ABOVE THE ELBOW AND AUTOMATIC   "[employer]" IS A GOOD BRAND : [facility]/ [facility] HAVE THEM    RECORD BP 3-4 TIMES PER WEEK AND BRING TO NEXT OFFICE VISIT    TO AVOID COVID (CORONAVIRUS) INFECTION THE FOLLOWING ARE HELPFUL:    1  Avoid areas where there are a lot of people  Examples would be small restaurants churches small grocery stores etc   Keep 6 feet between you  2  If you must go out around people use a mask    When around people use a hand  after touching surfaces  Important areas are grocery stores, gas pumps, pharmacies, [place], etc  Remember: masks protect the "other marty"  They are not a substitute for staying 6 feet away  3  KEEP YOUR HANDS AWAY FROM YOUR FACE  IT IS EXTREMELY DIFFICULT TO GET COVID INFECTION IF YOU DO NOT TOUCH YOUR FACE AND ARE NOT AROUND PEOPLE  3  If you wish to work in the yard or walk around the neighborhood or in a park and you are not around a lot of people it is okay to keep your mask in your pocket  Casually passing someone without a mask does not put you at risk  4  Try to avoid having people coming in and out of your home  This would include cleaning personnel delivery people unnecessary service people etc   after anyone comes into your home including family be careful to wipe all surfaces with a home   CURRENT [employer] TIPS ON EXERCISE:    IF YOU HAVE CONDITIONS THAT PREVENT AEROBIC EXERCISE:    WALK 30 MINUTES AT LEAST 5 DAYS PER WEEK; MAY BREAK IT UP INTO 10-  15 MINUTE SESSIONS   GET SOME RESISTANCE EXERCISES USING THE MAJOR MUSCLE GROUPS 2-3 TIMES PER WEEK     IF YOU ARE PHYSICALLY ABLE:    TRY TO GET 10,000 STEPS 3 TIMES PER WEEK  PLUS  GO "ONLINE" TO CHECK [employer] HEART RATE FOR YOUR AGE AND DO AEROBIC EXERCISES (JOG/STATIONARY BIKE/ELLIPTICAL) FOR 20-30 MINUTES 2-3 TIMES PER WEEK

## 2020-08-12 NOTE — PROGRESS NOTES
BMI Counseling: Body mass index is 27 8 kg/m²  The BMI is above normal  Nutrition recommendations include reducing portion sizes, decreasing overall calorie intake, 3-5 servings of fruits/vegetables daily, reducing fast food intake, consuming healthier snacks, decreasing soda and/or juice intake, moderation in carbohydrate intake, increasing intake of lean protein, reducing intake of saturated fat and trans fat and reducing intake of cholesterol  Exercise recommendations include exercising 3-5 times per week

## 2020-08-12 NOTE — PROGRESS NOTES
Assessment/Plan:    No problem-specific Assessment & Plan notes found for this encounter  Diagnoses and all orders for this visit:    BMI 27 0-27 9,adult    Allergic rhinitis, unspecified seasonality, unspecified trigger    Hyperlipidemia, unspecified hyperlipidemia type    Hyperglycemia  -     PSA Total, Diagnostic  -     Lipid panel  -     Hemoglobin A1C    Medicare annual wellness visit, subsequent  -     PSA Total, Diagnostic  -     Lipid panel  -     Hemoglobin A1C          Subjective:      Patient ID: Aleksandra Hawthorne is a 72 y o  male  PATIENT RETURNS FOR FOLLOW-UP OF CHRONIC MEDICAL CONDITIONS  NO HOSPITAL STAYS OR EMERGENCY VISITS RECENTLY  MEDS WERE REVIEWED AND NO SIDE EFFECTS  NO NEW ISSUES  UNLESS NOTED BELOW  NO NEW MEDICAL PROVIDER REPORTED  THE CHRONIC DISEASES LISTED ABOVE ARE STABLE AND UNCHANGED/ THE PLAN OF CARE FOR THOSE WILL REMAIN UNCHANGED UNLESS NOTED BELOW  AWV/sub     Some BPH sx starting           The following portions of the patient's history were reviewed and updated as appropriate: allergies, current medications, past family history, past medical history, past social history, past surgical history and problem list     Review of Systems   Constitutional: Negative for activity change and appetite change  HENT: Negative for trouble swallowing  Eyes: Negative for visual disturbance  Respiratory: Negative for cough and shortness of breath  Cardiovascular: Negative for chest pain, palpitations and leg swelling  Gastrointestinal: Negative for abdominal pain and blood in stool  Endocrine: Negative for polyuria  Genitourinary: Negative for difficulty urinating and hematuria  Skin: Negative for rash  Neurological: Negative for dizziness  Psychiatric/Behavioral: Negative for behavioral problems           Objective:  Vitals:    08/12/20 0756   BP: 150/96   BP Location: Left arm   Patient Position: Sitting   Cuff Size: Adult   Pulse: 84   Temp: 97 8 °F (36 6 °C)   TempSrc: Temporal   SpO2: 98%   Weight: 90 4 kg (199 lb 4 8 oz)   Height: 5' 11" (1 803 m)      Physical Exam  Constitutional:       Appearance: He is well-developed  HENT:      Head: Normocephalic and atraumatic  Eyes:      Conjunctiva/sclera: Conjunctivae normal    Neck:      Musculoskeletal: Neck supple  Thyroid: No thyromegaly  Cardiovascular:      Rate and Rhythm: Normal rate and regular rhythm  Heart sounds: Normal heart sounds  No murmur  Pulmonary:      Effort: Pulmonary effort is normal  No respiratory distress  Breath sounds: Normal breath sounds  Abdominal:      General: Abdomen is flat  Palpations: Abdomen is soft  There is no mass  Comments: JACQUELYN is normal   Lymphadenopathy:      Cervical: No cervical adenopathy  Skin:     General: Skin is warm and dry  Psychiatric:         Behavior: Behavior normal            Patient's chronic problems that were reviewed today are stable  Recent hospital stays reviewed  Recent labs and imaging reviewed  Recent visits to other providers reviewed  Meds reviewed and no changes made  Appropriate labs and imaging were ordered  Preventive measures appropriate for age and sex were reviewed with patient  Immunizations were updated as appropriate  Assessment and Plan:     Problem List Items Addressed This Visit        Respiratory    Allergic rhinitis (Chronic)       Other    Medicare annual wellness visit, subsequent (Chronic)    Relevant Orders    PSA Total, Diagnostic (Completed)    Lipid panel (Completed)    Hemoglobin A1C (Completed)    HLD (hyperlipidemia)    Hyperglycemia    Relevant Orders    PSA Total, Diagnostic (Completed)    Lipid panel (Completed)    Hemoglobin A1C (Completed)      Other Visit Diagnoses     BMI 27 0-27 9,adult    -  Primary           Preventive health issues were discussed with patient, and age appropriate screening tests were ordered as noted in patient's After Visit Summary    [facility] advice and appropriate referrals for health education or preventive services given if needed, as noted in patient's After Visit Summary       History of Present Illness:     Patient presents for Medicare Annual Wellness visit    Patient Care Team:  Kirsten Dumas MD as PCP - General     Problem List:     Patient Active Problem List   Diagnosis    HLD (hyperlipidemia)    Allergic rhinitis    Hyperglycemia    Medicare annual wellness visit, subsequent      Past Medical and Surgical History:     Past Medical History:   Diagnosis Date    Dupuytren contracture     bilateral     Past Surgical History:   Procedure Laterality Date    COLONOSCOPY W/ POLYPECTOMY  09/23/2004    HAND SURGERY Bilateral 12/2008    DUPUYTRENS CONTRACTURE SURGICAL RELEASE      Family History:     Family History   Problem Relation Age of Onset    Coronary artery disease Mother     Cirrhosis Father     Diabetes type II Family         multiple relatives    Heart disease Family         ASCVD      Social History:        Social History     Socioeconomic History    Marital status: /Civil Union     Spouse name: None    Number of children: None    Years of education: None    Highest education level: None   Occupational History    None   Social Needs    Financial resource strain: None    Food insecurity     Worry: None     Inability: None    Transportation needs     Medical: None     Non-medical: None   Tobacco Use    Smoking status: Current Some Day Smoker     Types: Cigars    Smokeless tobacco: Current User    Tobacco comment: no passive smoke exposure   Substance and Sexual Activity    Alcohol use: Yes     Frequency: 2-3 times a week    Drug use: No    Sexual activity: None   Lifestyle    Physical activity     Days per week: None     Minutes per session: None    Stress: None   Relationships    Social connections     Talks on phone: None     Gets together: None     Attends Restoration service: None     Active member of club or organization: None     Attends meetings of clubs or organizations: None     Relationship status: None    Intimate partner violence     Fear of current or ex partner: None     Emotionally abused: None     Physically abused: None     Forced sexual activity: None   Other Topics Concern    None   Social History Narrative    None      Medications and Allergies:     Current Outpatient Medications   Medication Sig Dispense Refill    clotrimazole-betamethasone (LOTRISONE) 1-0 05 % cream Apply topically 2 (two) times a day 30 g 0    lovastatin (MEVACOR) 40 MG tablet Take 1 tablet (40 mg total) by mouth daily 90 tablet 3     No current facility-administered medications for this visit  No Known Allergies   Immunizations:     Immunization History   Administered Date(s) Administered    INFLUENZA 09/26/2018    Influenza Split High Dose Preservative Free IM 09/26/2018, 09/28/2019    Pneumococcal Conjugate 13-Valent 08/15/2016    Pneumococcal Polysaccharide PPV23 08/23/2017    Td (adult), Unspecified 08/14/2005    Td (adult), adsorbed 08/30/2005    Tdap 08/10/2012    Zoster 12/31/2015    Zoster Vaccine Recombinant 10/08/2019, 12/11/2019      Health Maintenance:         Topic Date Due    Hepatitis C Screening  1950         Topic Date Due    Influenza Vaccine  07/01/2020      Medicare Health Risk Assessment:     /96 (BP Location: Left arm, Patient Position: Sitting, Cuff Size: Adult)   Pulse 84   Temp 97 8 °F (36 6 °C) (Temporal)   Ht 5' 11" (1 803 m)   Wt 90 4 kg (199 lb 4 8 oz)   SpO2 98%   BMI 27 80 kg/m²      Joe Brower is here for his Subsequent Wellness visit  Last Medicare Wellness visit information reviewed, patient interviewed, no change since last AWV  Depression Screening:   PHQ-2 Score: 2      Previous Hospitalizations:   Any hospitalizations or ED visits within the last 12 months?: No      Advance Care Planning:   Living will: Yes    Durable POA for healthcare:  Yes    Advanced directive: Yes      Cognitive Screening:   Provider or family/friend/caregiver concerned regarding cognition?: No    PREVENTIVE SCREENINGS      Cardiovascular Screening:    General: Screening Not Indicated and History Lipid Disorder    Due for: Lipid Panel      Diabetes Screening:     General: Screening Current      Colorectal Cancer Screening:     General: Screening Current      Prostate Cancer Screening:    General: Risks and Benefits Discussed    Due for: PSA      Abdominal Aortic Aneurysm (AAA) Screening:    Risk factors include: age between 73-69 yo and tobacco use        Lung Cancer Screening:     General: Screening Not Indicated      Hepatitis C Screening:    General: Screening Not Indicated      Rubens Magdaleno MD

## 2020-08-13 LAB
EST. AVERAGE GLUCOSE BLD GHB EST-MCNC: 103 MG/DL
HBA1C MFR BLD: 5.2 %
PSA SERPL-MCNC: 2.7 NG/ML (ref 0–4)

## 2020-10-13 ENCOUNTER — CLINICAL SUPPORT (OUTPATIENT)
Dept: FAMILY MEDICINE CLINIC | Facility: CLINIC | Age: 70
End: 2020-10-13
Payer: MEDICARE

## 2020-10-13 VITALS — TEMPERATURE: 97.3 F

## 2020-10-13 DIAGNOSIS — Z23 NEED FOR INFLUENZA VACCINATION: Primary | ICD-10-CM

## 2020-10-13 PROCEDURE — G0008 ADMIN INFLUENZA VIRUS VAC: HCPCS | Performed by: FAMILY MEDICINE

## 2020-10-13 PROCEDURE — 90662 IIV NO PRSV INCREASED AG IM: CPT | Performed by: FAMILY MEDICINE

## 2020-11-09 ENCOUNTER — CONSULT (OUTPATIENT)
Dept: FAMILY MEDICINE CLINIC | Facility: CLINIC | Age: 70
End: 2020-11-09
Payer: MEDICARE

## 2020-11-09 VITALS
BODY MASS INDEX: 28.15 KG/M2 | OXYGEN SATURATION: 98 % | HEART RATE: 77 BPM | TEMPERATURE: 97.8 F | SYSTOLIC BLOOD PRESSURE: 160 MMHG | WEIGHT: 201.1 LBS | DIASTOLIC BLOOD PRESSURE: 82 MMHG | HEIGHT: 71 IN

## 2020-11-09 DIAGNOSIS — R73.9 HYPERGLYCEMIA: ICD-10-CM

## 2020-11-09 DIAGNOSIS — H26.9 CATARACT OF BOTH EYES, UNSPECIFIED CATARACT TYPE: ICD-10-CM

## 2020-11-09 DIAGNOSIS — E78.5 HYPERLIPIDEMIA, UNSPECIFIED HYPERLIPIDEMIA TYPE: ICD-10-CM

## 2020-11-09 DIAGNOSIS — J30.9 ALLERGIC RHINITIS, UNSPECIFIED SEASONALITY, UNSPECIFIED TRIGGER: Primary | Chronic | ICD-10-CM

## 2020-11-09 PROCEDURE — 99214 OFFICE O/P EST MOD 30 MIN: CPT | Performed by: FAMILY MEDICINE

## 2020-11-09 RX ORDER — OFLOXACIN 3 MG/ML
2 SOLUTION/ DROPS OPHTHALMIC 2 TIMES DAILY
COMMUNITY
Start: 2020-10-07

## 2020-11-09 RX ORDER — KETOROLAC TROMETHAMINE 5 MG/ML
2 SOLUTION OPHTHALMIC 4 TIMES DAILY
COMMUNITY
Start: 2020-10-07 | End: 2021-08-17

## 2020-12-03 DIAGNOSIS — E78.5 HYPERLIPIDEMIA, UNSPECIFIED HYPERLIPIDEMIA TYPE: ICD-10-CM

## 2020-12-03 RX ORDER — LOVASTATIN 40 MG/1
40 TABLET ORAL DAILY
Qty: 90 TABLET | Refills: 3 | Status: SHIPPED | OUTPATIENT
Start: 2020-12-03 | End: 2021-11-23

## 2020-12-31 ENCOUNTER — IMMUNIZATIONS (OUTPATIENT)
Dept: FAMILY MEDICINE CLINIC | Facility: HOSPITAL | Age: 70
End: 2020-12-31

## 2020-12-31 DIAGNOSIS — Z23 ENCOUNTER FOR IMMUNIZATION: ICD-10-CM

## 2020-12-31 PROCEDURE — 91301 SARS-COV-2 / COVID-19 MRNA VACCINE (MODERNA) 100 MCG: CPT

## 2020-12-31 PROCEDURE — 0011A SARS-COV-2 / COVID-19 MRNA VACCINE (MODERNA) 100 MCG: CPT

## 2021-01-26 ENCOUNTER — IMMUNIZATIONS (OUTPATIENT)
Dept: FAMILY MEDICINE CLINIC | Facility: HOSPITAL | Age: 71
End: 2021-01-26

## 2021-01-26 DIAGNOSIS — Z23 ENCOUNTER FOR IMMUNIZATION: Primary | ICD-10-CM

## 2021-01-26 PROCEDURE — 91301 SARS-COV-2 / COVID-19 MRNA VACCINE (MODERNA) 100 MCG: CPT

## 2021-01-26 PROCEDURE — 0012A SARS-COV-2 / COVID-19 MRNA VACCINE (MODERNA) 100 MCG: CPT

## 2021-08-09 ENCOUNTER — POST-OP CHECK (OUTPATIENT)
Dept: URBAN - METROPOLITAN AREA CLINIC 6 | Facility: CLINIC | Age: 71
End: 2021-08-09

## 2021-08-09 DIAGNOSIS — Z96.1: ICD-10-CM

## 2021-08-09 PROCEDURE — 99024 POSTOP FOLLOW-UP VISIT: CPT

## 2021-08-09 ASSESSMENT — VISUAL ACUITY
OS_PH: 20/20-2
OS_SC: 20/25
OD_SC: 20/20-1

## 2021-08-09 ASSESSMENT — TONOMETRY
OS_IOP_MMHG: 15
OD_IOP_MMHG: 17

## 2021-08-17 ENCOUNTER — OFFICE VISIT (OUTPATIENT)
Dept: FAMILY MEDICINE CLINIC | Facility: CLINIC | Age: 71
End: 2021-08-17
Payer: MEDICARE

## 2021-08-17 VITALS
SYSTOLIC BLOOD PRESSURE: 142 MMHG | TEMPERATURE: 97.3 F | BODY MASS INDEX: 29.06 KG/M2 | WEIGHT: 203 LBS | OXYGEN SATURATION: 99 % | HEART RATE: 72 BPM | HEIGHT: 70 IN | DIASTOLIC BLOOD PRESSURE: 92 MMHG | RESPIRATION RATE: 18 BRPM

## 2021-08-17 DIAGNOSIS — R73.9 HYPERGLYCEMIA: ICD-10-CM

## 2021-08-17 DIAGNOSIS — J30.9 ALLERGIC RHINITIS, UNSPECIFIED SEASONALITY, UNSPECIFIED TRIGGER: Chronic | ICD-10-CM

## 2021-08-17 DIAGNOSIS — E78.5 HYPERLIPIDEMIA, UNSPECIFIED HYPERLIPIDEMIA TYPE: ICD-10-CM

## 2021-08-17 DIAGNOSIS — Z00.00 ANNUAL PHYSICAL EXAM: Primary | ICD-10-CM

## 2021-08-17 DIAGNOSIS — Z00.00 MEDICARE ANNUAL WELLNESS VISIT, SUBSEQUENT: Chronic | ICD-10-CM

## 2021-08-17 PROCEDURE — G0439 PPPS, SUBSEQ VISIT: HCPCS | Performed by: FAMILY MEDICINE

## 2021-08-17 PROCEDURE — 1123F ACP DISCUSS/DSCN MKR DOCD: CPT | Performed by: FAMILY MEDICINE

## 2021-08-17 PROCEDURE — 99214 OFFICE O/P EST MOD 30 MIN: CPT | Performed by: FAMILY MEDICINE

## 2021-08-17 NOTE — PROGRESS NOTES
Assessment/Plan:    No problem-specific Assessment & Plan notes found for this encounter  Diagnoses and all orders for this visit:    Annual physical exam    BMI 29 0-29 9,adult    Hyperlipidemia, unspecified hyperlipidemia type    Hyperglycemia    Allergic rhinitis, unspecified seasonality, unspecified trigger    Medicare annual wellness visit, subsequent          Subjective:      Patient ID: Angela Richard is a 80 y o  male  PATIENT RETURNS FOR FOLLOW-UP OF CHRONIC MEDICAL CONDITIONS  NO HOSPITAL STAYS OR EMERGENCY VISITS RECENTLY  MEDS WERE REVIEWED AND NO SIDE EFFECTS  NO NEW ISSUES  UNLESS NOTED BELOW  NO NEW MEDICAL PROVIDER REPORTED  THE CHRONIC DISEASES LISTED ABOVE ARE STABLE AND UNCHANGED/ THE PLAN OF CARE FOR THOSE WILL REMAIN UNCHANGED UNLESS NOTED BELOW       AWv/initial     R knee pain   The following portions of the patient's history were reviewed and updated as appropriate: allergies, current medications, past family history, past medical history, past social history, past surgical history and problem list     Review of Systems   Constitutional: Negative for activity change and appetite change  HENT: Negative for trouble swallowing  Eyes: Negative for visual disturbance  Respiratory: Negative for cough and shortness of breath  Cardiovascular: Negative for chest pain, palpitations and leg swelling  Gastrointestinal: Negative for abdominal pain and blood in stool  Endocrine: Negative for polyuria  Genitourinary: Positive for difficulty urinating  Negative for hematuria  Musculoskeletal: Positive for arthralgias  Skin: Negative for rash  Neurological: Negative for dizziness  Psychiatric/Behavioral: Negative for behavioral problems           Objective:  Vitals:    08/17/21 0947   BP: 142/92   BP Location: Left arm   Patient Position: Sitting   Cuff Size: Standard   Pulse: 72   Resp: 18   Temp: (!) 97 3 °F (36 3 °C)   TempSrc: Tympanic   SpO2: 99%   Weight: 92 1 kg (203 lb)   Height: 5' 10" (1 778 m)      Physical Exam  Constitutional:       Appearance: He is well-developed  HENT:      Head: Normocephalic and atraumatic  Eyes:      Conjunctiva/sclera: Conjunctivae normal    Neck:      Thyroid: No thyromegaly  Cardiovascular:      Rate and Rhythm: Normal rate and regular rhythm  Heart sounds: Normal heart sounds  No murmur heard  Pulmonary:      Effort: Pulmonary effort is normal  No respiratory distress  Breath sounds: Normal breath sounds  Musculoskeletal:      Cervical back: Neck supple  Lymphadenopathy:      Cervical: No cervical adenopathy  Skin:     General: Skin is warm and dry  Psychiatric:         Behavior: Behavior normal              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 ;    voltaren gel helps : continue same   Mild BPH sx : no meds yet   Patient's chronic problems that were reviewed today are stable  Recent hospital stays reviewed  Recent labs and imaging reviewed  Recent visits to other providers reviewed  Meds reviewed and no changes made  Appropriate labs and imaging were ordered  Preventive measures appropriate for age and sex were reviewed with patient  Immunizations were updated as appropriate  Assessment and Plan:     Problem List Items Addressed This Visit        Respiratory    Allergic rhinitis (Chronic)       Other    Medicare annual wellness visit, subsequent (Chronic)    HLD (hyperlipidemia)    Hyperglycemia      Other Visit Diagnoses     Annual physical exam    -  Primary    BMI 29 0-29 9,adult               Preventive health issues were discussed with patient, and age appropriate screening tests were ordered as noted in patient's After Visit Summary  Personalized health advice and appropriate referrals for health education or preventive services given if needed, as noted in patient's After Visit Summary       History of Present Illness:     Patient presents for Medicare Annual Wellness visit    Patient Care Team:  Raheem Lafleur MD as PCP - General     Problem List:     Patient Active Problem List   Diagnosis    HLD (hyperlipidemia)    Allergic rhinitis    Hyperglycemia    Medicare annual wellness visit, subsequent      Past Medical and Surgical History:     Past Medical History:   Diagnosis Date    Dupuytren contracture     bilateral     Past Surgical History:   Procedure Laterality Date    COLONOSCOPY W/ POLYPECTOMY  09/23/2004    HAND SURGERY Bilateral 12/2008    DUPUYTRENS CONTRACTURE SURGICAL RELEASE      Family History:     Family History   Problem Relation Age of Onset    Coronary artery disease Mother     Cirrhosis Father     Diabetes type II Family         multiple relatives    Heart disease Family         ASCVD      Social History:     Social History     Socioeconomic History    Marital status: /Civil Union     Spouse name: None    Number of children: None    Years of education: None    Highest education level: None   Occupational History    None   Tobacco Use    Smoking status: Current Some Day Smoker     Types: Cigars    Smokeless tobacco: Current User    Tobacco comment: cigar   Vaping Use    Vaping Use: Never used   Substance and Sexual Activity    Alcohol use: Yes     Alcohol/week: 4 0 standard drinks     Types: 2 Glasses of wine, 2 Shots of liquor per week     Comment: socially    Drug use: No    Sexual activity: Not Currently   Other Topics Concern    None   Social History Narrative    None     Social Determinants of Health     Financial Resource Strain:     Difficulty of Paying Living Expenses:    Food Insecurity:     Worried About Running Out of Food in the Last Year:     Ran Out of Food in the Last Year:    Transportation Needs:     Lack of Transportation (Medical):      Lack of Transportation (Non-Medical):    Physical Activity:     Days of Exercise per Week:     Minutes of Exercise per Session:    Stress:     Feeling of Stress :    Social Connections:     Frequency of Communication with Friends and Family:     Frequency of Social Gatherings with Friends and Family:     Attends Nondenominational Services:     Active Member of Clubs or Organizations:     Attends Club or Organization Meetings:     Marital Status:    Intimate Partner Violence:     Fear of Current or Ex-Partner:     Emotionally Abused:     Physically Abused:     Sexually Abused:       Medications and Allergies:     Current Outpatient Medications   Medication Sig Dispense Refill    lovastatin (MEVACOR) 40 MG tablet Take 1 tablet (40 mg total) by mouth daily 90 tablet 3    ofloxacin (OCUFLOX) 0 3 % ophthalmic solution Administer 2 drops to the right eye 2 (two) times a day       No current facility-administered medications for this visit  No Known Allergies   Immunizations:     Immunization History   Administered Date(s) Administered    INFLUENZA 09/26/2018    Influenza Split High Dose Preservative Free IM 09/26/2018, 09/28/2019    Influenza, high dose seasonal 0 7 mL 10/13/2020    Pneumococcal Conjugate 13-Valent 08/15/2016    Pneumococcal Polysaccharide PPV23 08/23/2017    SARS-CoV-2 / COVID-19 mRNA IM (Moderna) 12/31/2020, 01/26/2021    Td (adult), Unspecified 08/14/2005    Td (adult), adsorbed 08/30/2005    Tdap 08/10/2012    Zoster 12/31/2015    Zoster Vaccine Recombinant 10/08/2019, 12/11/2019      Health Maintenance:         Topic Date Due    Hepatitis C Screening  Never done    Colorectal Cancer Screening  12/09/2024         Topic Date Due    Influenza Vaccine (1) 09/01/2021      Medicare Health Risk Assessment:     /92 (BP Location: Left arm, Patient Position: Sitting, Cuff Size: Standard)   Pulse 72   Temp (!) 97 3 °F (36 3 °C) (Tympanic)   Resp 18   Ht 5' 10" (1 778 m)   Wt 92 1 kg (203 lb)   SpO2 99%   BMI 29 13 kg/m²      Shanique Mcguire is here for his Subsequent Wellness visit   Last Medicare Wellness visit information reviewed, patient interviewed, no change since last AWV  Depression Screening:   PHQ-2 Score: 0      Previous Hospitalizations:   Any hospitalizations or ED visits within the last 12 months?: No      Advance Care Planning:   Living will: Yes    Durable POA for healthcare:  Yes    Advanced directive: Yes      Cognitive Screening:   Provider or family/friend/caregiver concerned regarding cognition?: No    PREVENTIVE SCREENINGS      Cardiovascular Screening:    General: Screening Not Indicated, History Lipid Disorder and Screening Current      Diabetes Screening:     General: Screening Current      Colorectal Cancer Screening:     General: Screening Current      Prostate Cancer Screening:    General: Screening Current      Osteoporosis Screening:    General: Screening Not Indicated      Abdominal Aortic Aneurysm (AAA) Screening:    Risk factors include: age between 73-69 yo and tobacco use        General: Screening Not Indicated      Lung Cancer Screening:     General: Screening Not Indicated      Frank Borjas MD

## 2021-08-17 NOTE — PROGRESS NOTES
BMI Counseling: Body mass index is 29 13 kg/m²  The BMI is above normal  Nutrition recommendations include reducing portion sizes, decreasing overall calorie intake, 3-5 servings of fruits/vegetables daily, reducing fast food intake, consuming healthier snacks, decreasing soda and/or juice intake, moderation in carbohydrate intake, increasing intake of lean protein, reducing intake of saturated fat and trans fat and reducing intake of cholesterol  Exercise recommendations include moderate aerobic physical activity for 150 minutes/week, exercising 3-5 times per week, joining a gym and strength training exercises

## 2021-08-17 NOTE — PATIENT INSTRUCTIONS
Continue the Voltaren gel  On bad days you may use Tylenol arthritis 2 tablets up to twice daily  CURRENT [employer] TIPS ON EXERCISE:    IF YOU HAVE CONDITIONS THAT PREVENT AEROBIC EXERCISE:    WALK 30 MINUTES AT LEAST 5 DAYS PER WEEK; MAY BREAK IT UP INTO 10-  15 MINUTE SESSIONS   GET SOME RESISTANCE EXERCISES USING THE MAJOR MUSCLE GROUPS 2-3 TIMES PER WEEK     IF YOU ARE PHYSICALLY ABLE:    TRY TO GET 10,000 STEPS 3 TIMES PER WEEK  PLUS  GO "ONLINE" TO CHECK [employer] HEART RATE FOR YOUR AGE AND DO AEROBIC EXERCISES (JOG/STATIONARY BIKE/ELLIPTICAL) FOR 20-30 MINUTES 2-3 TIMES PER WEEK  PROPER BLOOD PRESSURE MEASUREMENTS INCLUDE:     SIT IN A STRAIGHT BACK CHAIR WITH YOUR FEET ON THE FLOOR   SIT AT LEAST 5 MINUTES BEFORE CHECKING BP   USE A CUFF THAT IS ABOVE THE ELBOW AND AUTOMATIC   "[employer]" IS A GOOD BRAND : [facility]/ [facility] HAVE THEM    RECORD BP 3-4 TIMES PER WEEK AND BRING TO NEXT OFFICE VISIT    AVOID THESE HIGH SALT FOODS:    SNACKS THAT TASTE SALTY: PRETZELS/CHIPSPOPCORN  CANNED SOUPS AND VEGGIES  FROZEN FOODS/ MICROWAVEABLE FOODS/ CANNED FOODS    AVOID ADDING EXTRA SALT TO THE MEALS     THESE THINGS HELP YOU LOSE WEIGHT:    REDUCE PORTIONS  DRINK WATER CONSTANTLY THROUGHOUT YOUR MEALS  ELIMINATE SWEET DRINKS  WEIGH DAILY AND KEEP A "VISUAL" CHART OF PROGRESS  REDUCE CARBOHYDRATES: PASTA/BREADS/BAGELS/DONUTS/ RICE ETC  DO SOME WALKING OR EXERCISE EVERY DAY FOR 20-30 MINUTES  Medicare Preventive Visit Patient Instructions  Thank you for completing your Welcome to Medicare Visit or Medicare Annual Wellness Visit today  Your next wellness visit will be due in one year (8/18/2022)  The screening/preventive services that you may require over the next 5-10 years are detailed below  Some tests may not apply to you based off risk factors and/or age  Screening tests ordered at today's visit but not completed yet may show as past due   Also, please note that scanned in results may not display below   Preventive Screenings:  Service Recommendations Previous Testing/Comments   Colorectal Cancer Screening  · Colonoscopy    · Fecal Occult Blood Test (FOBT)/Fecal Immunochemical Test (FIT)  · Fecal DNA/Cologuard Test  · Flexible Sigmoidoscopy Age: 54-65 years old   Colonoscopy: every 10 years (May be performed more frequently if at higher risk)  OR  FOBT/FIT: every 1 year  OR  Cologuard: every 3 years  OR  Sigmoidoscopy: every 5 years  Screening may be recommended earlier than age 48 if at higher risk for colorectal cancer  Also, an individualized decision between you and your healthcare provider will decide whether screening between the ages of 74-80 would be appropriate  Colonoscopy: 12/09/2019  FOBT/FIT: Not on file  Cologuard: Not on file  Sigmoidoscopy: Not on file    Screening Current     Prostate Cancer Screening Individualized decision between patient and health care provider in men between ages of 53-78   Medicare will cover every 12 months beginning on the day after your 50th birthday PSA: 2 7 ng/mL     Screening Current     Hepatitis C Screening Once for adults born between [facility]  More frequently in patients at high risk for Hepatitis C Hep C Antibody: Not on file        Diabetes Screening 1-2 times per year if you're at risk for diabetes or have pre-diabetes Fasting glucose: No results in last 5 years   A1C: 5 2 %    Screening Current   Cholesterol Screening Once every 5 years if you don't have a lipid disorder  May order more often based on risk factors  Lipid panel: 08/12/2020    Screening Not Indicated  History Lipid Disorder  Screening Current      Other Preventive Screenings Covered by Medicare:  1  Abdominal Aortic Aneurysm (AAA) Screening: covered once if your at risk  You're considered to be at risk if you have a family history of AAA or a male between the age of 73-68 who smoking at least 100 cigarettes in your lifetime    2  Lung Cancer Screening: covers low dose CT scan once per year if you meet all of the following conditions: (1) Age 50-69; (2) No signs or symptoms of lung cancer; (3) Current smoker or have quit smoking within the last 15 years; (4) You have a tobacco smoking history of at least 30 pack years (packs per day x number of years you smoked); (5) You get a written order from a healthcare provider  3  Glaucoma Screening: covered annually if you're considered high risk: (1) You have diabetes OR (2) Family history of glaucoma OR (3)  aged 48 and older OR (3)  American aged 72 and older  3  Osteoporosis Screening: covered every 2 years if you meet one of the following conditions: (1) Have a vertebral abnormality; (2) On glucocorticoid therapy for more than 3 months; (3) Have primary hyperparathyroidism; (4) On osteoporosis medications and need to assess response to drug therapy  5  HIV Screening: covered annually if you're between the age of 12-76  Also covered annually if you are younger than 13 and older than 72 with risk factors for HIV infection  For pregnant patients, it is covered up to 3 times per pregnancy  Immunizations:  Immunization Recommendations   Influenza Vaccine Annual influenza vaccination during flu season is recommended for all persons aged >= 6 months who do not have contraindications   Pneumococcal Vaccine (Prevnar and Pneumovax)  * Prevnar = PCV13  * Pneumovax = PPSV23 Adults 25-60 years old: 1-3 doses may be recommended based on certain risk factors  Adults 72 years old: Prevnar (PCV13) vaccine recommended followed by Pneumovax (PPSV23) vaccine  If already received PPSV23 since turning 65, then PCV13 recommended at least one year after PPSV23 dose     Hepatitis B Vaccine 3 dose series if at intermediate or high risk (ex: diabetes, end stage renal disease, liver disease)   Tetanus (Td) Vaccine - COST NOT COVERED BY MEDICARE PART B Following completion of primary series, a booster dose should be given every 10 years to maintain immunity against tetanus  Td may also be given as tetanus wound prophylaxis  Tdap Vaccine - COST NOT COVERED BY MEDICARE PART B Recommended at least once for all adults  For pregnant patients, recommended with each pregnancy  Shingles Vaccine (Shingrix) - COST NOT COVERED BY MEDICARE PART B  2 shot series recommended in those aged 48 and above     Health Maintenance Due:      Topic Date Due    Hepatitis C Screening  Never done    Colorectal Cancer Screening  12/09/2024     Immunizations Due:      Topic Date Due    Influenza Vaccine (1) 09/01/2021     Advance Directives   What are advance directives? Advance directives are legal documents that state your wishes and plans for medical care  These plans are made ahead of time in case you lose your ability to make decisions for yourself  Advance directives can apply to any medical decision, such as the treatments you want, and if you want to donate organs  What are the types of advance directives? There are many types of advance directives, and each state has rules about how to use them  You may choose a combination of any of the following:  · Living will: This is a written record of the treatment you want  You can also choose which treatments you do not want, which to limit, and which to stop at a certain time  This includes surgery, medicine, IV fluid, and tube feedings  · Durable power of  for healthcare [facility]): This is a written record that states who you want to make healthcare choices for you when you are unable to make them for yourself  This person, called a proxy, is usually a family member or a friend  You may choose more than 1 proxy  · Do not resuscitate (DNR) order:  A DNR order is used in case your heart stops beating or you stop breathing  It is a request not to have certain forms of treatment, such as CPR  A DNR order may be included in other types of advance directives  · Medical directive:   This covers the care that you want if you are in a coma, near death, or unable to make decisions for yourself  You can list the treatments you want for each condition  Treatment may include pain medicine, surgery, blood transfusions, dialysis, IV or tube feedings, and a ventilator (breathing machine)  · Values history: This document has questions about your views, beliefs, and how you feel and think about life  This information can help others choose the care that you would choose  Why are advance directives important? An advance directive helps you control your care  Although spoken wishes may be used, it is better to have your wishes written down  Spoken wishes can be misunderstood, or not followed  Treatments may be given even if you do not want them  An advance directive may make it easier for your family to make difficult choices about your care  Cigarette Smoking and Your Health   Risks to your health if you smoke:  Nicotine and other chemicals found in tobacco damage every cell in your body  Even if you are a light smoker, you have an increased risk for cancer, heart disease, and lung disease  If you are pregnant or have diabetes, smoking increases your risk for complications  Benefits to your health if you stop smoking:   · You decrease respiratory symptoms such as coughing, wheezing, and shortness of breath  · You reduce your risk for cancers of the lung, mouth, throat, kidney, bladder, pancreas, stomach, and cervix  If you already have cancer, you increase the benefits of chemotherapy  You also reduce your risk for cancer returning or a second cancer from developing  · You reduce your risk for heart disease, blood clots, heart attack, and stroke  · You reduce your risk for lung infections, and diseases such as pneumonia, asthma, chronic bronchitis, and emphysema  · Your circulation improves  More oxygen can be delivered to your body   If you have diabetes, you lower your risk for complications, such as kidney, artery, and eye diseases  You also lower your risk for nerve damage  Nerve damage can lead to amputations, poor vision, and blindness  · You improve your body's ability to heal and to fight infections  For more information and support to stop smoking:   · [employer]  Phone: 0- 247 - 042-9830  Web Address: [employer]  How to Quit Using Smokeless Tobacco   Why it is important to stop using smokeless tobacco:  Smokeless tobacco comes in many forms  Examples include chew, snuff, dip, dissolvable tobacco, and snus  All smokeless tobacco products contain nicotine and may contain as much nicotine as 3 cigarettes  You may be physically dependent on nicotine  You may also be emotionally addicted to it  The cravings can be strong, but it is important to quit using smokeless tobacco  You will improve your health and decrease your cancer, stroke, and heart attack risk  Mouth sores and tooth problems will also improve when you quit  You can benefit from quitting no matter how long you have used smokeless tobacco    Prepare to stop using smokeless tobacco:  Nicotine is a highly addictive drug  Withdrawal symptoms can happen when you stop and make it hard to quit  The following can help keep you on track:  · Set a quit date  · Tell friends, family, and coworkers that you plan to quit  · Remove all smokeless tobacco products from your home, car, and workplace  Manage weight gain after you quit:  Nicotine can affect your metabolism  You may gain a few pounds after you quit  The following can help you control your weight:  · Eat healthy foods  · Drink water before, during, and between meals  · Exercise as directed  Weight Management   Why it is important to manage your weight:  Being overweight increases your risk of health conditions such as heart disease, high blood pressure, type 2 diabetes, and certain types of cancer  It can also increase your risk for osteoarthritis, sleep apnea, and other respiratory problems  Aim for a slow, steady weight loss  Even a small amount of weight loss can lower your risk of health problems  How to lose weight safely:  A safe and healthy way to lose weight is to eat fewer calories and get regular exercise  You can lose up about 1 pound a week by decreasing the number of calories you eat by 500 calories each day  Healthy meal plan for weight management:  A healthy meal plan includes a variety of foods, contains fewer calories, and helps you stay healthy  A healthy meal plan includes the following:  · Eat whole-grain foods more often  A healthy meal plan should contain fiber  Fiber is the part of grains, fruits, and vegetables that is not broken down by your body  Whole-grain foods are healthy and provide extra fiber in your diet  Some examples of whole-grain foods are whole-wheat breads and pastas, oatmeal, brown rice, and bulgur  · Eat a variety of vegetables every day  Include dark, leafy greens such as spinach, kale, rafat greens, and mustard greens  Eat yellow and orange vegetables such as carrots, sweet potatoes, and winter squash  · Eat a variety of fruits every day  Choose fresh or canned fruit (canned in its own juice or light syrup) instead of juice  Fruit juice has very little or no fiber  · Eat low-fat dairy foods  Drink fat-free (skim) milk or 1% milk  Eat fat-free yogurt and low-fat cottage cheese  Try low-fat cheeses such as mozzarella and other reduced-fat cheeses  · Choose meat and other protein foods that are low in fat  Choose beans or other legumes such as split peas or lentils  Choose fish, skinless poultry (chicken or turkey), or lean cuts of red meat (beef or pork)  Before you cook meat or poultry, cut off any visible fat  · Use less fat and oil  Try baking foods instead of frying them  Add less fat, such as margarine, sour cream, regular salad dressing and mayonnaise to foods  Eat fewer high-fat foods   Some examples of high-fat foods include french fries, doughnuts, ice cream, and cakes  · Eat fewer sweets  Limit foods and drinks that are high in sugar  This includes candy, cookies, regular soda, and sweetened drinks  Exercise:  Exercise at least 30 minutes per day on most days of the week  Some examples of exercise include walking, biking, dancing, and swimming  You can also fit in more physical activity by taking the stairs instead of the elevator or parking farther away from stores  Ask your healthcare provider about the best exercise plan for you  © Copyright [employer] 2018 Information is for End User's use only and may not be sold, redistributed or otherwise used for commercial purposes   All illustrations and images included in CareNotes® are the copyrighted property of [employer]  or [address] [place], [place] [address]pe

## 2021-11-17 ENCOUNTER — IMMUNIZATIONS (OUTPATIENT)
Dept: FAMILY MEDICINE CLINIC | Facility: HOSPITAL | Age: 71
End: 2021-11-17

## 2021-11-17 DIAGNOSIS — Z23 ENCOUNTER FOR IMMUNIZATION: Primary | ICD-10-CM

## 2021-11-17 PROCEDURE — 91306 COVID-19 MODERNA VACC 0.25 ML BOOSTER: CPT

## 2021-11-17 PROCEDURE — 0064A COVID-19 MODERNA VACC 0.25 ML BOOSTER: CPT

## 2021-12-09 ENCOUNTER — OFFICE VISIT (OUTPATIENT)
Dept: FAMILY MEDICINE CLINIC | Facility: CLINIC | Age: 71
End: 2021-12-09
Payer: MEDICARE

## 2021-12-09 VITALS
HEART RATE: 76 BPM | HEIGHT: 70 IN | TEMPERATURE: 97.9 F | BODY MASS INDEX: 28.69 KG/M2 | OXYGEN SATURATION: 99 % | WEIGHT: 200.4 LBS | SYSTOLIC BLOOD PRESSURE: 164 MMHG | RESPIRATION RATE: 18 BRPM | DIASTOLIC BLOOD PRESSURE: 96 MMHG

## 2021-12-09 DIAGNOSIS — H61.22 IMPACTED CERUMEN OF LEFT EAR: Primary | ICD-10-CM

## 2021-12-09 PROCEDURE — 69209 REMOVE IMPACTED EAR WAX UNI: CPT | Performed by: FAMILY MEDICINE

## 2021-12-09 PROCEDURE — 99213 OFFICE O/P EST LOW 20 MIN: CPT | Performed by: FAMILY MEDICINE

## 2022-03-30 ENCOUNTER — FOLLOW UP (OUTPATIENT)
Dept: URBAN - METROPOLITAN AREA CLINIC 6 | Facility: CLINIC | Age: 72
End: 2022-03-30

## 2022-03-30 DIAGNOSIS — H52.13: ICD-10-CM

## 2022-03-30 DIAGNOSIS — Z96.1: ICD-10-CM

## 2022-03-30 PROCEDURE — 92014 COMPRE OPH EXAM EST PT 1/>: CPT

## 2022-03-30 ASSESSMENT — VISUAL ACUITY
OU_SC: J1+
OS_SC: 20/20-1
OD_SC: 20/20

## 2022-03-30 ASSESSMENT — TONOMETRY
OS_IOP_MMHG: 16
OD_IOP_MMHG: 19

## 2022-06-22 ENCOUNTER — APPOINTMENT (OUTPATIENT)
Dept: LAB | Age: 72
End: 2022-06-22
Payer: MEDICARE

## 2022-06-22 DIAGNOSIS — B35.3 TINEA PEDIS, UNSPECIFIED LATERALITY: ICD-10-CM

## 2022-06-22 DIAGNOSIS — L73.8 SENILE SEBACEOUS GLAND HYPERPLASIA: ICD-10-CM

## 2022-06-22 DIAGNOSIS — B35.0 TINEA BARBAE: ICD-10-CM

## 2022-06-22 DIAGNOSIS — L57.0 ACTINIC KERATOSIS: ICD-10-CM

## 2022-06-22 DIAGNOSIS — L82.1 OTHER SEBORRHEIC KERATOSIS: ICD-10-CM

## 2022-06-22 DIAGNOSIS — D22.4 MELANOCYTIC NEVUS OF SCALP: ICD-10-CM

## 2022-06-22 LAB
ALBUMIN SERPL BCP-MCNC: 3.7 G/DL (ref 3.5–5)
ALP SERPL-CCNC: 69 U/L (ref 46–116)
ALT SERPL W P-5'-P-CCNC: 30 U/L (ref 12–78)
AST SERPL W P-5'-P-CCNC: 15 U/L (ref 5–45)
BILIRUB DIRECT SERPL-MCNC: 0.1 MG/DL (ref 0–0.2)
BILIRUB SERPL-MCNC: 0.61 MG/DL (ref 0.2–1)
PROT SERPL-MCNC: 7.1 G/DL (ref 6.4–8.2)

## 2022-06-22 PROCEDURE — 36415 COLL VENOUS BLD VENIPUNCTURE: CPT

## 2022-06-22 PROCEDURE — 80076 HEPATIC FUNCTION PANEL: CPT

## 2022-07-18 ENCOUNTER — TELEMEDICINE (OUTPATIENT)
Dept: FAMILY MEDICINE CLINIC | Facility: CLINIC | Age: 72
End: 2022-07-18
Payer: MEDICARE

## 2022-07-18 DIAGNOSIS — U07.1 COVID-19 VIRUS INFECTION: Primary | ICD-10-CM

## 2022-07-18 PROCEDURE — 99214 OFFICE O/P EST MOD 30 MIN: CPT | Performed by: NURSE PRACTITIONER

## 2022-07-18 RX ORDER — TERBINAFINE HYDROCHLORIDE 250 MG/1
250 TABLET ORAL DAILY
COMMUNITY
Start: 2022-05-19 | End: 2022-09-12

## 2022-07-18 NOTE — PROGRESS NOTES
COVID-19 Outpatient Progress Note    Assessment/Plan:    Problem List Items Addressed This Visit    None     Visit Diagnoses     COVID-19 virus infection    -  Primary    Relevant Medications    molnupiravir 200 mg capsule         Disposition:     Patient has COVID-19 infection  Based off CDC guidelines, they were recommended to isolate for 5 days from the date of the positive test  If they remain asymptomatic, isolation may be ended followed by 5 days of wearing a mask when around othes to minimize risk of infecting others  If they have a fever, continue to stay home until fever resolves for at least 24 hours  Patient meets criteria for Molnupiravir and they have been counseled according to EUA documentation provided by the FDA  After discussion, patient agrees to treatment  Nancy Chika is an investigational medicine used to treat mild-to-moderate COVID-19 in adults with positive results of direct SARS-CoV-2 viral testing, and who are at high risk for progressing to severe COVID-19 including hospitalization or death, and for whom other COVID-19 treatment options authorized by the FDA are not accessible or clinically appropriate  The FDA has authorized the emergency use of molnupiravir for the treatment of mild-tomoderate COVID-19 in adults under an EUA  Nancy Chika is not authorized:  - for use in people less than 25years of age   - for prevention of COVID-19   - for people needing hospitalization for COVID-19   - for use for longer than 5 consecutive days    What is the most important information I should know about molnupiravir? Nancy Chika may cause serious side effects, including:    Nancy Chika may cause harm to your unborn baby  It is not known if molnupiravir will harm your baby if you take molnupiravir during pregnancy  - Nancy Chika is not recommended for use in pregnancy  - Nancy Chika has not been studied in pregnancy  Nancy Chika was studied in pregnant animals only   When molnupiravir was given to pregnant animals, molnupiravir caused harm to their unborn babies   - You and your healthcare provider may decide that you should take molnupiravir duringpregnancy if there are no other COVID-19 treatment options authorized by the FDA that are accessible or clinically appropriate for you  - If you and your healthcare provider decide that you should take molnupiravir during pregnancy, you and your healthcare provider should discuss the known and potential benefits and the potential risks of taking molnupiravir during pregnancy  For individuals who are able to become pregnant:  - You should use a reliable method of birth control (contraception) consistently and correctly during treatment with molnupiravir and for 4 days after the last dose of molnupiravir  Talk to your healthcare provider about reliable birth control methods  - Before starting treatment with molnupiravir your healthcare provider may do a pregnancy test to see if you are pregnant before starting treatment with molnupiravir  - Tell your healthcare provider right away if you become pregnant or think you may be pregnant during treatment with molnupiravir  Pregnancy Surveillance Program:  - There is a pregnancy surveillance program for individuals who take molnupiravir during pregnancy  The purpose of this program is to collect information about the health of you and your baby  Talk to your healthcare provider about how to take part in this program   - If you take molnupiravir during pregnancy and you agree to participate in the pregnancy surveillance program and allow your healthcare provider to share your information with Biju Adan, then your healthcare provider will report your use of molnupiravir during pregnancy to [address] [place] [address],[address]  by calling [phone] or pregnancyreporting msd com      For individuals who are sexually active with partners who are able to become pregnant:  - It is not known if molnupiravir can affect sperm  While the risk is regarded as low, animal studies to fully assess the potential for molnupiravir to affect the babies of males treated with molnupiravir have not been completed  A reliable method of birth control (contraception) should be used consistently and correctly during treatment with molnupiravir and for at least 3 months after the last dose  The risk to sperm beyond 3 months is not known  Studies to understand the risk to sperm beyond 3 months are ongoing  Talk to your healthcare provider about reliable birth control methods  Talk to your healthcare provider if you have questions or concerns about how molnupiravir may affect sperm  How do I take molnupiravir? - Take molnupiravir exactly as your healthcare provider tells you to take it  - Take 4 capsules of molnupiravir every 12 hours (for example, at 8 am and at 8 pm)  - Take molnupiravir for 5 days  It is important that you complete the full 5 days of treatment with molnupiravir  Do not stop taking molnupiravir before you complete the full 5 days of treatment, even if you feel better  - Take molnupiravir with or without food  - You should stay in isolation for as long as your healthcare provider tells you to  Talk to your healthcare provider if you are not sure about how to properly isolate while you have COVID-19   - Swallow molnupiravir capsules whole  Do not open, break, or crush the capsules  If you cannot swallow capsules whole, tell your healthcare provider  What to do if you miss a dose:  - If it has been less than 10 hours since the missed dose, take it as soon as you remember  - If it has been more than 10 hours since the missed dose, skip the missed dose and take your dose at the next scheduled time  - Do not double the dose of molnupiravir to make up for a missed dose  What are the important possible side effects of molnupiravir?     Possible side effects of molnupiravir are:  - SeeAnna is the most important information I should know about molnupiravir?   - Diarrhea  - Nausea  - Dizziness    These are not all the possible side effects of molnupiravir  Not many people have taken molnupiravir  Serious and unexpected side effects may happen  This medicine is still being studied, so it is possible that all of the risks are not known at this time  What other treatment choices are there? Like Alex Music may allow for the emergency use of other medicines to treat people with COVID-19  Go to [facility] for more information  It is your choice to be treated or not to be treated with molnupiravir  Should you decide not to take it, it will not change your standard medical care  What if I am breastfeeding? Breastfeeding is not recommended during treatment with molnupiravir and for 4 days after the last dose of molnupiravir  If you are breastfeeding or plan to breastfeed, talk to your healthcare provider about your options and specific situation before taking molnupiravir  How do I report side effects with molnupiravir? Contact your healthcare provider if you have any side effects that bother you or do not go away  Report side effects to FDA MedWatch at www fda gov/medwatch or call 6-314-YNQ-5274 (1-991.272.7980)  How should I store [address]? - Store molnupiravir capsules at room temperature between 68°F to 77°F (20°C to 25°C)  - Keep molnupiravir and all medicines out of the reach of children and pets  Full fact sheet for patients, parents, and caregivers can be found at: Tyrel doss    I have spent 15 minutes directly with the patient   Greater than 50% of this time was spent in counseling/coordination of care regarding: diagnostic results, prognosis, risks and benefits of treatment options, instructions for management, patient and family education, importance of treatment compliance and impressions  Encounter provider SUSHIL España    Provider located at [facility] AT [address] [facility]  [address] [place] [id number]    Recent Visits  No visits were found meeting these conditions  Showing recent visits within past 7 days and meeting all other requirements  Today's Visits  Date Type Provider Dept   07/18/22 Telemedicine Lidia Clay, [address]   Showing today's visits and meeting all other requirements  Future Appointments  No visits were found meeting these conditions  Showing future appointments within next 150 days and meeting all other requirements     This virtual check-in was done via [employer] and patient was informed that this is a secure, HIPAA-compliant platform  He agrees to proceed  Patient agrees to participate in a virtual check in via telephone or video visit instead of presenting to the office to address urgent/immediate medical needs  Patient is aware this is a billable service  After connecting through Porter, the patient was identified by name and date of birth  Efrain Chris was informed that this was a telemedicine visit and that the exam was being conducted confidentially over secure lines  My office door was closed  The patient was notified the following individuals were present in the room: [id number], TIFF  Efrain Chris acknowledged consent and understanding of privacy and security of the telemedicine visit  I informed the patient that I have reviewed his record in Epic and presented the opportunity for him to ask any questions regarding the visit today  The patient agreed to participate  Verification of patient location:  Patient is located in the following state in which I hold an active license: [place]    Subjective:   Efrain Chris is a 80 y o  male who has been screened for COVID-19   Symptom change since last report: unchanged  Patient's symptoms include fatigue, malaise, nasal congestion, sore throat, cough and chest tightness  Patient denies fever, chills, rhinorrhea, anosmia, loss of taste, shortness of breath, abdominal pain, nausea, vomiting, diarrhea, myalgias and headaches  - Date of symptom onset: 7/16/2022  - Date of positive COVID-19 test: 7/18/2022  Type of test: Home antigen  Patient with typical symptoms of COVID-19 and they attest that they were positive on home rapid antigen testing  Image of positive result is not able to be uploaded into their chart  COVID-19 vaccination status: Fully vaccinated with booster    Serene Spatz has been staying home and has isolated themselves in his home  He is taking care to not share personal items and is cleaning all surfaces that are touched often, like counters, tabletops, and doorknobs using household cleaning sprays or wipes  He is wearing a mask when he leaves his room  No results found for: SARSCOV2, [address] [facility], SARSCORONAVI, CORONAVIRUSR, SARSCOVAG, [facility]  Past Medical History:   Diagnosis Date    Dupuytren contracture     bilateral     Past Surgical History:   Procedure Laterality Date    COLONOSCOPY W/ POLYPECTOMY  09/23/2004    HAND SURGERY Bilateral 12/2008    DUPUYTRENS CONTRACTURE SURGICAL RELEASE     Current Outpatient Medications   Medication Sig Dispense Refill    lovastatin (MEVACOR) 40 MG tablet TAKE 1 TABLET BY MOUTH EVERY DAY 90 tablet 6    molnupiravir 200 mg capsule Take 4 capsules (800 mg total) by mouth every 12 (twelve) hours for 5 days 40 capsule 0    terbinafine (LamISIL) 250 mg tablet Take 250 mg by mouth daily      ofloxacin (OCUFLOX) 0 3 % ophthalmic solution Administer 2 drops to the right eye 2 (two) times a day       No current facility-administered medications for this visit  No Known Allergies    Review of Systems   Constitutional: Positive for fatigue  Negative for chills and fever     HENT: Positive for congestion and sore throat  Negative for rhinorrhea  Respiratory: Positive for cough and chest tightness  Negative for shortness of breath  Gastrointestinal: Negative for abdominal pain, diarrhea, nausea and vomiting  Musculoskeletal: Negative for myalgias  Neurological: Negative for headaches  Objective:    Vitals:       Physical Exam  Constitutional:       Appearance: He is ill-appearing  HENT:      Nose: Congestion (audible) present  Pulmonary:      Effort: Pulmonary effort is normal  No respiratory distress (no dyspnea while conversing  Dry raspy cough heard )  Neurological:      Mental Status: He is alert and oriented to person, place, and time  Psychiatric:         Mood and Affect: Mood normal          Behavior: Behavior normal          VIRTUAL VISIT Maryan Chao verbally agrees to participate in [facility]  Pt is aware that [facility] could be limited without vital signs or the ability to perform a full hands-on physical exam  Ezio Sanchez understands he or the provider may request at any time to terminate the video visit and request the patient to seek care or treatment in person

## 2022-09-13 ENCOUNTER — OFFICE VISIT (OUTPATIENT)
Dept: FAMILY MEDICINE CLINIC | Facility: CLINIC | Age: 72
End: 2022-09-13
Payer: MEDICARE

## 2022-09-13 VITALS
HEIGHT: 70 IN | OXYGEN SATURATION: 99 % | WEIGHT: 193.6 LBS | HEART RATE: 70 BPM | TEMPERATURE: 97.6 F | DIASTOLIC BLOOD PRESSURE: 102 MMHG | SYSTOLIC BLOOD PRESSURE: 168 MMHG | BODY MASS INDEX: 27.72 KG/M2 | RESPIRATION RATE: 18 BRPM

## 2022-09-13 DIAGNOSIS — U07.1 COVID-19: Chronic | ICD-10-CM

## 2022-09-13 DIAGNOSIS — Z00.00 MEDICARE ANNUAL WELLNESS VISIT, SUBSEQUENT: Primary | Chronic | ICD-10-CM

## 2022-09-13 DIAGNOSIS — J30.9 ALLERGIC RHINITIS, UNSPECIFIED SEASONALITY, UNSPECIFIED TRIGGER: Chronic | ICD-10-CM

## 2022-09-13 DIAGNOSIS — E78.5 HYPERLIPIDEMIA, UNSPECIFIED HYPERLIPIDEMIA TYPE: ICD-10-CM

## 2022-09-13 DIAGNOSIS — R73.9 HYPERGLYCEMIA: ICD-10-CM

## 2022-09-13 LAB — SL AMB POCT HEMOGLOBIN AIC: 5.3 (ref ?–6.5)

## 2022-09-13 PROCEDURE — 99214 OFFICE O/P EST MOD 30 MIN: CPT | Performed by: FAMILY MEDICINE

## 2022-09-13 PROCEDURE — 83036 HEMOGLOBIN GLYCOSYLATED A1C: CPT | Performed by: FAMILY MEDICINE

## 2022-09-13 PROCEDURE — G0439 PPPS, SUBSEQ VISIT: HCPCS | Performed by: FAMILY MEDICINE

## 2022-09-13 NOTE — PROGRESS NOTES
Assessment and Plan:     Problem List Items Addressed This Visit        Respiratory    Allergic rhinitis (Chronic)       Other    Medicare annual wellness visit, subsequent - Primary (Chronic)    COVID-19 (Chronic)    HLD (hyperlipidemia)     Recent values to goal on statin         Hyperglycemia     A1C has consistently been normal                 Preventive health issues were discussed with patient, and age appropriate screening tests were ordered as noted in patient's After Visit Summary  Personalized health advice and appropriate referrals for health education or preventive services given if needed, as noted in patient's After Visit Summary       History of Present Illness:     Patient presents for a Medicare Wellness Visit    HPI   Patient Care Team:  Shantal Salazar MD as PCP - General     Review of Systems:     Review of Systems     Problem List:     Patient Active Problem List   Diagnosis    HLD (hyperlipidemia)    Allergic rhinitis    Hyperglycemia    Medicare annual wellness visit, subsequent    COVID-19      Past Medical and Surgical History:     Past Medical History:   Diagnosis Date    Dupuytren contracture     bilateral     Past Surgical History:   Procedure Laterality Date    COLONOSCOPY W/ POLYPECTOMY  09/23/2004    HAND SURGERY Bilateral 12/2008    DUPUYTRENS CONTRACTURE SURGICAL RELEASE      Family History:     Family History   Problem Relation Age of Onset    Coronary artery disease Mother     Cirrhosis Father     Diabetes type II Family         multiple relatives    Heart disease Family         ASCVD      Social History:     Social History     Socioeconomic History    Marital status: /Civil Union     Spouse name: None    Number of children: None    Years of education: None    Highest education level: None   Occupational History    None   Tobacco Use    Smoking status: Current Some Day Smoker     Types: Cigars    Smokeless tobacco: Current User    Tobacco comment: cigar Vaping Use    Vaping Use: Never used   Substance and Sexual Activity    Alcohol use: Yes     Alcohol/week: 4 0 standard drinks     Types: 2 Glasses of wine, 2 Shots of liquor per week     Comment: socially    Drug use: No    Sexual activity: Not Currently   Other Topics Concern    None   Social History Narrative    None     Social Determinants of Health     Financial Resource Strain: Low Risk     Difficulty of Paying Living Expenses: Not hard at all   Food Insecurity: Not on file   Transportation Needs: No Transportation Needs    Lack of Transportation (Medical): No    Lack of Transportation (Non-Medical): No   Physical Activity: Not on file   Stress: Not on file   Social Connections: Not on file   Intimate Partner Violence: Not on file   Housing Stability: Not on file      Medications and Allergies:     Current Outpatient Medications   Medication Sig Dispense Refill    lovastatin (MEVACOR) 40 MG tablet TAKE 1 TABLET BY MOUTH EVERY DAY 90 tablet 6     No current facility-administered medications for this visit       No Known Allergies   Immunizations:     Immunization History   Administered Date(s) Administered    COVID-19 MODERNA VACC 0 25 ML IM BOOSTER 11/17/2021    COVID-19 MODERNA VACC 0 5 ML IM 12/31/2020, 01/26/2021    INFLUENZA 09/26/2018, 10/15/2021    Influenza Split High Dose Preservative Free IM 09/26/2018, 09/28/2019    Influenza, high dose seasonal 0 7 mL 10/13/2020, 10/15/2021    Pneumococcal Conjugate 13-Valent 08/15/2016    Pneumococcal Polysaccharide PPV23 08/23/2017    Td (adult), Unspecified 08/14/2005    Td (adult), adsorbed 08/30/2005    Tdap 08/10/2012    Zoster 12/31/2015    Zoster Vaccine Recombinant 10/08/2019, 12/11/2019      Health Maintenance:         Topic Date Due    Hepatitis C Screening  Never done    Colorectal Cancer Screening  12/09/2024         Topic Date Due    COVID-19 Vaccine (4 - Booster for Moderna series) 03/17/2022    DTaP,Tdap,and Td Vaccines (2 - Td or Tdap) 08/10/2022    Influenza Vaccine (1) 2022      Medicare Screening Tests and Risk Assessments:     Gris Schultz is here for his Subsequent Wellness visit  Last Medicare Wellness visit information reviewed, patient interviewed, no change since last AWV  Depression Screening:   PHQ-2 Score: 0      Previous Hospitalizations:   Any hospitalizations or ED visits within the last 12 months?: No      Cognitive Screening:   Provider or family/friend/caregiver concerned regarding cognition?: No    PREVENTIVE SCREENINGS      Cardiovascular Screening:    General: Screening Not Indicated, History Lipid Disorder and Screening Current      Diabetes Screening:     General: Risks and Benefits Discussed      Colorectal Cancer Screening:     General: Screening Current      Prostate Cancer Screening:    General: Screening Current      Osteoporosis Screening:    General: Screening Not Indicated      Abdominal Aortic Aneurysm (AAA) Screening:    Risk factors include: age between 73-69 yo and tobacco use        Lung Cancer Screening:     General: Screening Not Indicated      Hepatitis C Screening:    General: Screening Not Indicated    Screening, Brief Intervention, and Referral to Treatment (SBIRT)    Screening      AUDIT-C Screenin) How often did you have a drink containing alcohol in the past year? 2 to 4 times a month  2) How many drinks did you have on a typical day when you were drinking in the past year? 1 to 2  3) How often did you have 6 or more drinks on one occasion in the past year? never    AUDIT-C Score: 2  Interpretation: Score 0-3 (male): Negative screen for alcohol misuse    Brief Intervention  Alcohol & drug use screenings were reviewed  No concerns regarding substance use disorder identified       No exam data present     Physical Exam:     BP (!) 168/102 (BP Location: Left arm, Patient Position: Sitting, Cuff Size: Standard)   Pulse 70   Temp 97 6 °F (36 4 °C) (Temporal)   Resp 18   Ht 5' 9 75" (1 772 m)   Wt 87 8 kg (193 lb 9 6 oz)   SpO2 99%   BMI 27 98 kg/m²     Physical Exam     Aria Dixon MD

## 2022-09-13 NOTE — PATIENT INSTRUCTIONS
Your tetanus shot is due to be updated  I recommend an adult DTaP  This is best secured at her pharmacy  I do recommend the annual high-dose flu shot for you  The best timing is probably October or November  TO AVOID COVID (CORONAVIRUS) INFECTION  (including all currently known variants) THE FOLLOWING ARE HELPFUL:    1  Avoid indoor areas where there are a lot of people  Examples would be small restaurants and churches, small grocery stores etc   Keep 6 feet between you  Use a N-95 or KN 95 mask  2  If you must go indoors around people use a mask  Even fully immunized and boosted people can get Covid but usually less risk and disease seems to be less severe  Most people (90%) in hospital with Covid today are NON- IMMUNIZED  I strongly recommend that you consider getting the Covid vaccine  It is effective against Delta and Omicron at preventing serious illness/ hospitalizations, as well as original strains  Patients age 11 and above are now eligible  Current information indicates that it is quite safe and extremely effective  The risk of dying from [place] after being fully immunized is less that one in a million  The risk of serious side effects from the vaccines is extremely low  The risk from getting Covid far outweighs the risk of serious side effects from the vaccine  The vaccine is available at most pharmacies and the [address] [place] [address]  "vaccines  gov" is a good website to check for vaccine availability  I recommend the Francis Peter or Moderna vaccines  Call [address] [place] [address] to register for the vaccine : [phone] : option 7 ; Note that the Francis Peter vaccine now been FULLY APPROVED by FDA  No longer Emergency Use status  Likely the Moderna vaccine will receive full approval soon as well  I strongly encourage Covid booster shots  Vaccine boosters have now been approved under Emergency Use and are available for adult and adolescent patients   The boosters are available at your local pharmacy  You may contact [facility] as well for boosters or primary vaccine dosing  The FDA has recently recommended the BIVALENT Covid vaccine for booster dosing; it is available for use so long as you have 2 months since the last booster or primary series and are greater than 15years of age  The Omicron variant has shown some resistance to the 3 vaccines so boosters are highly recommended and appear to improve protection back to 85-90% against severe illness and hospitalization  Medicare Preventive Visit Patient Instructions  Thank you for completing your Welcome to Medicare Visit or Medicare Annual Wellness Visit today  Your next wellness visit will be due in one year (9/14/2023)  The screening/preventive services that you may require over the next 5-10 years are detailed below  Some tests may not apply to you based off risk factors and/or age  Screening tests ordered at today's visit but not completed yet may show as past due  Also, please note that scanned in results may not display below  Preventive Screenings:  Service Recommendations Previous Testing/Comments   Colorectal Cancer Screening  · Colonoscopy    · Fecal Occult Blood Test (FOBT)/Fecal Immunochemical Test (FIT)  · Fecal DNA/Cologuard Test  · Flexible Sigmoidoscopy Age: 39-70 years old   Colonoscopy: every 10 years (May be performed more frequently if at higher risk)  OR  FOBT/FIT: every 1 year  OR  Cologuard: every 3 years  OR  Sigmoidoscopy: every 5 years  Screening may be recommended earlier than age 39 if at higher risk for colorectal cancer  Also, an individualized decision between you and your healthcare provider will decide whether screening between the ages of 74-80 would be appropriate   Colonoscopy: 12/09/2019  FOBT/FIT: Not on file  Cologuard: Not on file  Sigmoidoscopy: Not on file    Screening Current     Prostate Cancer Screening Individualized decision between patient and health care provider in men between ages of 53-78   Medicare will cover every 12 months beginning on the day after your 50th birthday PSA: 2 7 ng/mL     Screening Current     Hepatitis C Screening Once for adults born between 1945 and 1965  More frequently in patients at high risk for Hepatitis C Hep C Antibody: Not on file        Diabetes Screening 1-2 times per year if you're at risk for diabetes or have pre-diabetes Fasting glucose: No results in last 5 years (No results in last 5 years)  A1C: 5 2 % (8/12/2020)  Screening Not Indicated   Cholesterol Screening Once every 5 years if you don't have a lipid disorder  May order more often based on risk factors  Lipid panel: 08/12/2020  Screening Not Indicated  History Lipid Disorder      Other Preventive Screenings Covered by Medicare:  1  Abdominal Aortic Aneurysm (AAA) Screening: covered once if your at risk  You're considered to be at risk if you have a family history of AAA or a male between the age of 73-68 who smoking at least 100 cigarettes in your lifetime  2  Lung Cancer Screening: covers low dose CT scan once per year if you meet all of the following conditions: (1) Age 50-69; (2) No signs or symptoms of lung cancer; (3) Current smoker or have quit smoking within the last 15 years; (4) You have a tobacco smoking history of at least 20 pack years (packs per day x number of years you smoked); (5) You get a written order from a healthcare provider  3  Glaucoma Screening: covered annually if you're considered high risk: (1) You have diabetes OR (2) Family history of glaucoma OR (3)  aged 48 and older OR (3)  American aged 72 and older  3  Osteoporosis Screening: covered every 2 years if you meet one of the following conditions: (1) Have a vertebral abnormality; (2) On glucocorticoid therapy for more than 3 months; (3) Have primary hyperparathyroidism; (4) On osteoporosis medications and need to assess response to drug therapy    5  HIV Screening: covered annually if you're between the age of 12-76  Also covered annually if you are younger than 13 and older than 72 with risk factors for HIV infection  For pregnant patients, it is covered up to 3 times per pregnancy  Immunizations:  Immunization Recommendations   Influenza Vaccine Annual influenza vaccination during flu season is recommended for all persons aged >= 6 months who do not have contraindications   Pneumococcal Vaccine   * Pneumococcal conjugate vaccine = PCV13 (Prevnar 13), PCV15 (Vaxneuvance), PCV20 (Prevnar 20)  * Pneumococcal polysaccharide vaccine = PPSV23 (Pneumovax) Adults 25-60 years old: 1-3 doses may be recommended based on certain risk factors  Adults 72 years old: 1-2 doses may be recommended based off what pneumonia vaccine you previously received   Hepatitis B Vaccine 3 dose series if at intermediate or high risk (ex: diabetes, end stage renal disease, liver disease)   Tetanus (Td) Vaccine - COST NOT COVERED BY MEDICARE PART B Following completion of primary series, a booster dose should be given every 10 years to maintain immunity against tetanus  Td may also be given as tetanus wound prophylaxis  Tdap Vaccine - COST NOT COVERED BY MEDICARE PART B Recommended at least once for all adults  For pregnant patients, recommended with each pregnancy  Shingles Vaccine (Shingrix) - COST NOT COVERED BY MEDICARE PART B  2 shot series recommended in those aged 48 and above     Health Maintenance Due:      Topic Date Due    Hepatitis C Screening  Never done    Colorectal Cancer Screening  12/09/2024     Immunizations Due:      Topic Date Due    COVID-19 Vaccine (4 - Booster for Moderna series) 03/17/2022    DTaP,Tdap,and Td Vaccines (2 - Td or Tdap) 08/10/2022    Influenza Vaccine (1) 09/01/2022     Advance Directives   What are advance directives? Advance directives are legal documents that state your wishes and plans for medical care   These plans are made ahead of time in case you lose your ability to make decisions for yourself  Advance directives can apply to any medical decision, such as the treatments you want, and if you want to donate organs  What are the types of advance directives? There are many types of advance directives, and each state has rules about how to use them  You may choose a combination of any of the following:  · Living will: This is a written record of the treatment you want  You can also choose which treatments you do not want, which to limit, and which to stop at a certain time  This includes surgery, medicine, IV fluid, and tube feedings  · Durable power of  for healthcare [facility]): This is a written record that states who you want to make healthcare choices for you when you are unable to make them for yourself  This person, called a proxy, is usually a family member or a friend  You may choose more than 1 proxy  · Do not resuscitate (DNR) order:  A DNR order is used in case your heart stops beating or you stop breathing  It is a request not to have certain forms of treatment, such as CPR  A DNR order may be included in other types of advance directives  · Medical directive: This covers the care that you want if you are in a coma, near death, or unable to make decisions for yourself  You can list the treatments you want for each condition  Treatment may include pain medicine, surgery, blood transfusions, dialysis, IV or tube feedings, and a ventilator (breathing machine)  · Values history: This document has questions about your views, beliefs, and how you feel and think about life  This information can help others choose the care that you would choose  Why are advance directives important? An advance directive helps you control your care  Although spoken wishes may be used, it is better to have your wishes written down  Spoken wishes can be misunderstood, or not followed  Treatments may be given even if you do not want them   An advance directive may make it easier for your family to make difficult choices about your care  Cigarette Smoking and Your Health   Risks to your health if you smoke:  Nicotine and other chemicals found in tobacco damage every cell in your body  Even if you are a light smoker, you have an increased risk for cancer, heart disease, and lung disease  If you are pregnant or have diabetes, smoking increases your risk for complications  Benefits to your health if you stop smoking:   · You decrease respiratory symptoms such as coughing, wheezing, and shortness of breath  · You reduce your risk for cancers of the lung, mouth, throat, kidney, bladder, pancreas, stomach, and cervix  If you already have cancer, you increase the benefits of chemotherapy  You also reduce your risk for cancer returning or a second cancer from developing  · You reduce your risk for heart disease, blood clots, heart attack, and stroke  · You reduce your risk for lung infections, and diseases such as pneumonia, asthma, chronic bronchitis, and emphysema  · Your circulation improves  More oxygen can be delivered to your body  If you have diabetes, you lower your risk for complications, such as kidney, artery, and eye diseases  You also lower your risk for nerve damage  Nerve damage can lead to amputations, poor vision, and blindness  · You improve your body's ability to heal and to fight infections  For more information and support to stop smoking:   · [employer]  Phone: 6- 073 - [phone]  Web Address: [employer]  How to Quit Using Smokeless Tobacco   Why it is important to stop using smokeless tobacco:  Smokeless tobacco comes in many forms  Examples include chew, snuff, dip, dissolvable tobacco, and snus  All smokeless tobacco products contain nicotine and may contain as much nicotine as 3 cigarettes  You may be physically dependent on nicotine  You may also be emotionally addicted to it   The cravings can be strong, but it is important to quit using smokeless tobacco  You will improve your health and decrease your cancer, stroke, and heart attack risk  Mouth sores and tooth problems will also improve when you quit  You can benefit from quitting no matter how long you have used smokeless tobacco    Prepare to stop using smokeless tobacco:  Nicotine is a highly addictive drug  Withdrawal symptoms can happen when you stop and make it hard to quit  The following can help keep you on track:  · Set a quit date  · Tell friends, family, and coworkers that you plan to quit  · Remove all smokeless tobacco products from your home, car, and workplace  Manage weight gain after you quit:  Nicotine can affect your metabolism  You may gain a few pounds after you quit  The following can help you control your weight:  · Eat healthy foods  · Drink water before, during, and between meals  · Exercise as directed  Weight Management   Why it is important to manage your weight:  Being overweight increases your risk of health conditions such as heart disease, high blood pressure, type 2 diabetes, and certain types of cancer  It can also increase your risk for osteoarthritis, sleep apnea, and other respiratory problems  Aim for a slow, steady weight loss  Even a small amount of weight loss can lower your risk of health problems  How to lose weight safely:  A safe and healthy way to lose weight is to eat fewer calories and get regular exercise  You can lose up about 1 pound a week by decreasing the number of calories you eat by 500 calories each day  Healthy meal plan for weight management:  A healthy meal plan includes a variety of foods, contains fewer calories, and helps you stay healthy  A healthy meal plan includes the following:  · Eat whole-grain foods more often  A healthy meal plan should contain fiber  Fiber is the part of grains, fruits, and vegetables that is not broken down by your body   Whole-grain foods are healthy and provide extra fiber in your diet  Some examples of whole-grain foods are whole-wheat breads and pastas, oatmeal, brown rice, and bulgur  · Eat a variety of vegetables every day  Include dark, leafy greens such as spinach, kale, rafat greens, and mustard greens  Eat yellow and orange vegetables such as carrots, sweet potatoes, and winter squash  · Eat a variety of fruits every day  Choose fresh or canned fruit (canned in its own juice or light syrup) instead of juice  Fruit juice has very little or no fiber  · Eat low-fat dairy foods  Drink fat-free (skim) milk or 1% milk  Eat fat-free yogurt and low-fat cottage cheese  Try low-fat cheeses such as mozzarella and other reduced-fat cheeses  · Choose meat and other protein foods that are low in fat  Choose beans or other legumes such as split peas or lentils  Choose fish, skinless poultry (chicken or turkey), or lean cuts of red meat (beef or pork)  Before you cook meat or poultry, cut off any visible fat  · Use less fat and oil  Try baking foods instead of frying them  Add less fat, such as margarine, sour cream, regular salad dressing and mayonnaise to foods  Eat fewer high-fat foods  Some examples of high-fat foods include french fries, doughnuts, ice cream, and cakes  · Eat fewer sweets  Limit foods and drinks that are high in sugar  This includes candy, cookies, regular soda, and sweetened drinks  Exercise:  Exercise at least 30 minutes per day on most days of the week  Some examples of exercise include walking, biking, dancing, and swimming  You can also fit in more physical activity by taking the stairs instead of the elevator or parking farther away from stores  Ask your healthcare provider about the best exercise plan for you  © Copyright [employer] 2018 Information is for End User's use only and may not be sold, redistributed or otherwise used for commercial purposes   All illustrations and images inclu  ded in CareNotes® are the copyrighted property of [employer]  or Kristine Murphy back to routine exercise : it should help the anxiety and blood pressure   Medicare Preventive Visit Patient Instructions  Thank you for completing your Welcome to Medicare Visit or Medicare Annual Wellness Visit today  Your next wellness visit will be due in one year (9/14/2023)  The screening/preventive services that you may require over the next 5-10 years are detailed below  Some tests may not apply to you based off risk factors and/or age  Screening tests ordered at today's visit but not completed yet may show as past due  Also, please note that scanned in results may not display below  Preventive Screenings:  Service Recommendations Previous Testing/Comments   Colorectal Cancer Screening  · Colonoscopy    · Fecal Occult Blood Test (FOBT)/Fecal Immunochemical Test (FIT)  · Fecal DNA/Cologuard Test  · Flexible Sigmoidoscopy Age: 39-70 years old   Colonoscopy: every 10 years (May be performed more frequently if at higher risk)  OR  FOBT/FIT: every 1 year  OR  Cologuard: every 3 years  OR  Sigmoidoscopy: every 5 years  Screening may be recommended earlier than age 39 if at higher risk for colorectal cancer  Also, an individualized decision between you and your healthcare provider will decide whether screening between the ages of 74-80 would be appropriate   Colonoscopy: 12/09/2019  FOBT/FIT: Not on file  Cologuard: Not on file  Sigmoidoscopy: Not on file    Screening Current  Screening Current     Prostate Cancer Screening Individualized decision between patient and health care provider in men between ages of 53-78   Medicare will cover every 12 months beginning on the day after your 50th birthday PSA: 2 7 ng/mL     Screening Current  Screening Current     Hepatitis C Screening Once for adults born between [facility]  More frequently in patients at high risk for Hepatitis C Hep C Antibody: Not on file    Screening Not Indicated   Diabetes Screening 1-2 times per year if you're at risk for diabetes or have pre-diabetes Fasting glucose: No results in last 5 years (No results in last 5 years)  A1C: 5 2 % (8/12/2020)  Screening Not Indicated  Risks and Benefits Discussed   Cholesterol Screening Once every 5 years if you don't have a lipid disorder  May order more often based on risk factors  Lipid panel: 08/12/2020  Screening Not Indicated  History Lipid Disorder  Screening Not Indicated  History Lipid Disorder  Screening Current      Other Preventive Screenings Covered by Medicare:  1  Abdominal Aortic Aneurysm (AAA) Screening: covered once if your at risk  You're considered to be at risk if you have a family history of AAA or a male between the age of 73-68 who smoking at least 100 cigarettes in your lifetime  2  Lung Cancer Screening: covers low dose CT scan once per year if you meet all of the following conditions: (1) Age 50-69; (2) No signs or symptoms of lung cancer; (3) Current smoker or have quit smoking within the last 15 years; (4) You have a tobacco smoking history of at least 20 pack years (packs per day x number of years you smoked); (5) You get a written order from a healthcare provider  3  Glaucoma Screening: covered annually if you're considered high risk: (1) You have diabetes OR (2) Family history of glaucoma OR (3)  aged 48 and older OR (3)  American aged 72 and older  3  Osteoporosis Screening: covered every 2 years if you meet one of the following conditions: (1) Have a vertebral abnormality; (2) On glucocorticoid therapy for more than 3 months; (3) Have primary hyperparathyroidism; (4) On osteoporosis medications and need to assess response to drug therapy  5  HIV Screening: covered annually if you're between the age of 12-76  Also covered annually if you are younger than 13 and older than 72 with risk factors for HIV infection  For pregnant patients, it is covered up to 3 times per pregnancy      Immunizations:  Immunization Recommendations   Influenza Vaccine Annual influenza vaccination during flu season is recommended for all persons aged >= 6 months who do not have contraindications   Pneumococcal Vaccine   * Pneumococcal conjugate vaccine = PCV13 (Prevnar 13), PCV15 (Vaxneuvance), PCV20 (Prevnar 20)  * Pneumococcal polysaccharide vaccine = PPSV23 (Pneumovax) Adults 25-60 years old: 1-3 doses may be recommended based on certain risk factors  Adults 72 years old: 1-2 doses may be recommended based off what pneumonia vaccine you previously received   Hepatitis B Vaccine 3 dose series if at intermediate or high risk (ex: diabetes, end stage renal disease, liver disease)   Tetanus (Td) Vaccine - COST NOT COVERED BY MEDICARE PART B Following completion of primary series, a booster dose should be given every 10 years to maintain immunity against tetanus  Td may also be given as tetanus wound prophylaxis  Tdap Vaccine - COST NOT COVERED BY MEDICARE PART B Recommended at least once for all adults  For pregnant patients, recommended with each pregnancy  Shingles Vaccine (Shingrix) - COST NOT COVERED BY MEDICARE PART B  2 shot series recommended in those aged 48 and above     Health Maintenance Due:      Topic Date Due    Hepatitis C Screening  Never done    Colorectal Cancer Screening  12/09/2024     Immunizations Due:      Topic Date Due    COVID-19 Vaccine (4 - Booster for Moderna series) 03/17/2022    DTaP,Tdap,and Td Vaccines (2 - Td or Tdap) 08/10/2022    Influenza Vaccine (1) 09/01/2022     Advance Directives   What are advance directives? Advance directives are legal documents that state your wishes and plans for medical care  These plans are made ahead of time in case you lose your ability to make decisions for yourself  Advance directives can apply to any medical decision, such as the treatments you want, and if you want to donate organs  What are the types of advance directives?   There are many types of advance directives, and each state has rules about how to use them  You may choose a combination of any of the following:  · Living will: This is a written record of the treatment you want  You can also choose which treatments you do not want, which to limit, and which to stop at a certain time  This includes surgery, medicine, IV fluid, and tube feedings  · Durable power of  for healthcare [facility]): This is a written record that states who you want to make healthcare choices for you when you are unable to make them for yourself  This person, called a proxy, is usually a family member or a friend  You may choose more than 1 proxy  · Do not resuscitate (DNR) order:  A DNR order is used in case your heart stops beating or you stop breathing  It is a request not to have certain forms of treatment, such as CPR  A DNR order may be included in other types of advance directives  · Medical directive: This covers the care that you want if you are in a coma, near death, or unable to make decisions for yourself  You can list the treatments you want for each condition  Treatment may include pain medicine, surgery, blood transfusions, dialysis, IV or tube feedings, and a ventilator (breathing machine)  · Values history: This document has questions about your views, beliefs, and how you feel and think about life  This information can help others choose the care that you would choose  Why are advance directives important? An advance directive helps you control your care  Although spoken wishes may be used, it is better to have your wishes written down  Spoken wishes can be misunderstood, or not followed  Treatments may be given even if you do not want them  An advance directive may make it easier for your family to make difficult choices about your care  Cigarette Smoking and Your Health   Risks to your health if you smoke:  Nicotine and other chemicals found in tobacco damage every cell in your body   Even if you are a light smoker, you have an increased risk for cancer, heart disease, and lung disease  If you are pregnant or have diabetes, smoking increases your risk for complications  Benefits to your health if you stop smoking:   · You decrease respiratory symptoms such as coughing, wheezing, and shortness of breath  · You reduce your risk for cancers of the lung, mouth, throat, kidney, bladder, pancreas, stomach, and cervix  If you already have cancer, you increase the benefits of chemotherapy  You also reduce your risk for cancer returning or a second cancer from developing  · You reduce your risk for heart disease, blood clots, heart attack, and stroke  · You reduce your risk for lung infections, and diseases such as pneumonia, asthma, chronic bronchitis, and emphysema  · Your circulation improves  More oxygen can be delivered to your body  If you have diabetes, you lower your risk for complications, such as kidney, artery, and eye diseases  You also lower your risk for nerve damage  Nerve damage can lead to amputations, poor vision, and blindness  · You improve your body's ability to heal and to fight infections  For more information and support to stop smoking:   · [employer]  Phone: [phone] [phone]  Web Address: www [employer]  How to Quit Using Smokeless Tobacco   Why it is important to stop using smokeless tobacco:  Smokeless tobacco comes in many forms  Examples include chew, snuff, dip, dissolvable tobacco, and snus  All smokeless tobacco products contain nicotine and may contain as much nicotine as 3 cigarettes  You may be physically dependent on nicotine  You may also be emotionally addicted to it  The cravings can be strong, but it is important to quit using smokeless tobacco  You will improve your health and decrease your cancer, stroke, and heart attack risk  Mouth sores and tooth problems will also improve when you quit   You can benefit from quitting no matter how long you have used smokeless tobacco    Prepare to stop using smokeless tobacco:  Nicotine is a highly addictive drug  Withdrawal symptoms can happen when you stop and make it hard to quit  The following can help keep you on track:  · Set a quit date  · Tell friends, family, and coworkers that you plan to quit  · Remove all smokeless tobacco products from your home, car, and workplace  Manage weight gain after you quit:  Nicotine can affect your metabolism  You may gain a few pounds after you quit  The following can help you control your weight:  · Eat healthy foods  · Drink water before, during, and between meals  · Exercise as directed  Weight Management   Why it is important to manage your weight:  Being overweight increases your risk of health conditions such as heart disease, high blood pressure, type 2 diabetes, and certain types of cancer  It can also increase your risk for osteoarthritis, sleep apnea, and other respiratory problems  Aim for a slow, steady weight loss  Even a small amount of weight loss can lower your risk of health problems  How to lose weight safely:  A safe and healthy way to lose weight is to eat fewer calories and get regular exercise  You can lose up about 1 pound a week by decreasing the number of calories you eat by 500 calories each day  Healthy meal plan for weight management:  A healthy meal plan includes a variety of foods, contains fewer calories, and helps you stay healthy  A healthy meal plan includes the following:  · Eat whole-grain foods more often  A healthy meal plan should contain fiber  Fiber is the part of grains, fruits, and vegetables that is not broken down by your body  Whole-grain foods are healthy and provide extra fiber in your diet  Some examples of whole-grain foods are whole-wheat breads and pastas, oatmeal, brown rice, and bulgur  · Eat a variety of vegetables every day  Include dark, leafy greens such as spinach, kale, rafat greens, and mustard greens   Eat yellow and orange vegetables such as carrots, sweet potatoes, and winter squash  · Eat a variety of fruits every day  Choose fresh or canned fruit (canned in its own juice or light syrup) instead of juice  Fruit juice has very little or no fiber  · Eat low-fat dairy foods  Drink fat-free (skim) milk or 1% milk  Eat fat-free yogurt and low-fat cottage cheese  Try low-fat cheeses such as mozzarella and other reduced-fat cheeses  · Choose meat and other protein foods that are low in fat  Choose beans or other legumes such as split peas or lentils  Choose fish, skinless poultry (chicken or turkey), or lean cuts of red meat (beef or pork)  Before you cook meat or poultry, cut off any visible fat  · Use less fat and oil  Try baking foods instead of frying them  Add less fat, such as margarine, sour cream, regular salad dressing and mayonnaise to foods  Eat fewer high-fat foods  Some examples of high-fat foods include french fries, doughnuts, ice cream, and cakes  · Eat fewer sweets  Limit foods and drinks that are high in sugar  This includes candy, cookies, regular soda, and sweetened drinks  Exercise:  Exercise at least 30 minutes per day on most days of the week  Some examples of exercise include walking, biking, dancing, and swimming  You can also fit in more physical activity by taking the stairs instead of the elevator or parking farther away from stores  Ask your healthcare provider about the best exercise plan for you  © Copyright [employer] 2018 Information is for End User's use only and may not be sold, redistributed or otherwise used for commercial purposes   All illustrations and images included in CareNotes® are the copyrighted property of [employer]  or [address] [place] [address]

## 2022-09-13 NOTE — PROGRESS NOTES
Assessment and Plan:     Problem List Items Addressed This Visit        Respiratory    Allergic rhinitis (Chronic)       Other    Medicare annual wellness visit, subsequent - Primary (Chronic)    HLD (hyperlipidemia)    Hyperglycemia           Preventive health issues were discussed with patient, and age appropriate screening tests were ordered as noted in patient's After Visit Summary  Personalized health advice and appropriate referrals for health education or preventive services given if needed, as noted in patient's After Visit Summary  History of Present Illness:     Patient presents for a Medicare Wellness Visit    HPI   Patient Care Team:  Dima Ray MD as PCP - General     Review of Systems:     Review of Systems     Problem List:     Patient Active Problem List   Diagnosis    HLD (hyperlipidemia)    Allergic rhinitis    Hyperglycemia    Medicare annual wellness visit, subsequent      Past Medical and Surgical History:     Past Medical History:   Diagnosis Date    Dupuytren contracture     bilateral     Past Surgical History:   Procedure Laterality Date    COLONOSCOPY W/ POLYPECTOMY  09/23/2004    HAND SURGERY Bilateral 12/2008    DUPUYTRENS CONTRACTURE SURGICAL RELEASE      Family History:     Family History   Problem Relation Age of Onset    Coronary artery disease Mother     Cirrhosis Father     Diabetes type II Family         multiple relatives    Heart disease Family         ASCVD      Social History:     Social History     Socioeconomic History    Marital status: /Civil Union     Spouse name: None    Number of children: None    Years of education: None    Highest education level: None   Occupational History    None   Tobacco Use    Smoking status: Current Some Day Smoker     Types: Cigars    Smokeless tobacco: Current User    Tobacco comment: cigar   Vaping Use    Vaping Use: Never used   Substance and Sexual Activity    Alcohol use:  Yes     Alcohol/week: 4 0 standard drinks     Types: 2 Glasses of wine, 2 Shots of liquor per week     Comment: socially    Drug use: No    Sexual activity: Not Currently   Other Topics Concern    None   Social History Narrative    None     Social Determinants of Health     Financial Resource Strain: Not on file   Food Insecurity: Not on file   Transportation Needs: Not on file   Physical Activity: Not on file   Stress: Not on file   Social Connections: Not on file   Intimate Partner Violence: Not on file   Housing Stability: Not on file      Medications and Allergies:     Current Outpatient Medications   Medication Sig Dispense Refill    lovastatin (MEVACOR) 40 MG tablet TAKE 1 TABLET BY MOUTH EVERY DAY 90 tablet 6     No current facility-administered medications for this visit  No Known Allergies   Immunizations:     Immunization History   Administered Date(s) Administered    COVID-19 MODERNA VACC 0 25 ML IM BOOSTER 11/17/2021    COVID-19 MODERNA VACC 0 5 ML IM 12/31/2020, 01/26/2021    INFLUENZA 09/26/2018, 10/15/2021    Influenza Split High Dose Preservative Free IM 09/26/2018, 09/28/2019    Influenza, high dose seasonal 0 7 mL 10/13/2020, 10/15/2021    Pneumococcal Conjugate 13-Valent 08/15/2016    Pneumococcal Polysaccharide PPV23 08/23/2017    Td (adult), Unspecified 08/14/2005    Td (adult), adsorbed 08/30/2005    Tdap 08/10/2012    Zoster 12/31/2015    Zoster Vaccine Recombinant 10/08/2019, 12/11/2019      Health Maintenance:         Topic Date Due    Hepatitis C Screening  Never done    Colorectal Cancer Screening  12/09/2024         Topic Date Due    COVID-19 Vaccine (4 - Booster for Moderna series) 03/17/2022    DTaP,Tdap,and Td Vaccines (2 - Td or Tdap) 08/10/2022    Influenza Vaccine (1) 09/01/2022      Medicare Screening Tests and Risk Assessments:     Kian Persaud is here for his Subsequent Wellness visit  Health Risk Assessment:   Patient rates overall health as good   Patient feels that their physical health rating is same  Eyesight was rated as same  Hearing was rated as same  Patient feels that their emotional and mental health rating is much better  Patients states they are never, rarely angry  Patient states they are never, rarely unusually tired/fatigued  Pain experienced in the last 7 days has been none  Patient states that he has experienced no weight loss or gain in last 6 months  Depression Screening:   PHQ-2 Score: 0      Home Safety:  Patient does not have trouble with stairs inside or outside of their home  Patient has working smoke alarms and has working carbon monoxide detector  Home safety hazards include: none  Nutrition:   Current diet is Regular  Medications:   Patient is currently taking over-the-counter supplements  OTC medications include: see medication list  Patient is able to manage medications  Activities of Daily Living (ADLs)/Instrumental Activities of Daily Living (IADLs):   Walk and transfer into and out of bed and chair?: Yes  Dress and groom yourself?: Yes    Bathe or shower yourself?: Yes    Feed yourself? Yes  Do your laundry/housekeeping?: Yes  Manage your money, pay your bills and track your expenses?: Yes  Make your own meals?: Yes    Do your own shopping?: Yes    Previous Hospitalizations:   Any hospitalizations or ED visits within the last 12 months?: No      Advance Care Planning:   Living will: Yes    Durable POA for healthcare:  Yes    Advanced directive: Yes      Cognitive Screening:   Provider or family/friend/caregiver concerned regarding cognition?: No    PREVENTIVE SCREENINGS      Cardiovascular Screening:    General: Screening Not Indicated and History Lipid Disorder      Diabetes Screening:     General: Screening Not Indicated      Colorectal Cancer Screening:     General: Screening Current      Prostate Cancer Screening:    General: Screening Current      Osteoporosis Screening:    General: Screening Not Indicated      Abdominal Aortic Aneurysm (AAA) Screening:    Risk factors include: age between 73-69 yo and tobacco use        Lung Cancer Screening:     General: Screening Not Indicated    Screening, Brief Intervention, and Referral to Treatment (SBIRT)    Screening  Typical number of drinks in a day: 0  Typical number of drinks in a week: 3  Interpretation: Low risk drinking behavior  AUDIT-C Screenin) How often did you have a drink containing alcohol in the past year? 2 to 4 times a month  2) How many drinks did you have on a typical day when you were drinking in the past year? 1 to 2  3) How often did you have 6 or more drinks on one occasion in the past year? never    AUDIT-C Score: 2  Interpretation: Score 0-3 (male): Negative screen for alcohol misuse    Other Counseling Topics:   Car/seat belt/driving safety, skin self-exam, sunscreen and calcium and vitamin D intake and regular weightbearing exercise       No exam data present     Physical Exam:     BP (!) 168/102 (BP Location: Left arm, Patient Position: Sitting, Cuff Size: Standard)   Pulse 70   Temp 97 6 °F (36 4 °C) (Temporal)   Resp 18   Ht 5' 9 75" (1 772 m)   Wt 87 8 kg (193 lb 9 6 oz)   SpO2 99%   BMI 27 98 kg/m²     Physical Exam     Aimee Manrique MD

## 2022-09-13 NOTE — PROGRESS NOTES
Assessment/Plan:    HLD (hyperlipidemia)  Recent values to goal on statin    Hyperglycemia  A1C has consistently been normal        Diagnoses and all orders for this visit:    Medicare annual wellness visit, subsequent    Allergic rhinitis, unspecified seasonality, unspecified trigger    Hyperlipidemia, unspecified hyperlipidemia type    Hyperglycemia    COVID-19          Subjective:      Patient ID: Shiv Magdaleno is a 71 y o  male  PATIENT RETURNS FOR FOLLOW-UP OF CHRONIC MEDICAL CONDITIONS  ANY HOSPITAL VISITS, EMERGENCY VISITS AND OTHER PROVIDER VISITS SINCE LAST TIME WERE REVIEWED  MEDS WERE REVIEWED AND NO SIDE EFFECTS  NO NEW ISSUES  UNLESS NOTED BELOW  NO NEW MEDICAL PROVIDER REPORTED  THE CHRONIC DISEASES LISTED ABOVE ARE STABLE AND UNCHANGED/ THE PLAN OF CARE FOR THOSE WILL REMAIN UNCHANGED UNLESS NOTED BELOW  AWV/ sub     Episodic anxiety : The following portions of the patient's history were reviewed and updated as appropriate: allergies, current medications, past family history, past medical history, past social history, past surgical history and problem list     Review of Systems   Constitutional: Negative for activity change and appetite change  HENT: Negative for trouble swallowing  Eyes: Negative for visual disturbance  Respiratory: Negative for cough and shortness of breath  Cardiovascular: Negative for chest pain, palpitations and leg swelling  Gastrointestinal: Negative for abdominal pain and blood in stool  Endocrine: Negative for polyuria  Genitourinary: Negative for difficulty urinating and hematuria  Skin: Negative for rash  Neurological: Negative for dizziness  Psychiatric/Behavioral: Negative for behavioral problems           Objective:  Vitals:    09/13/22 0900   BP: (!) 168/102   BP Location: Left arm   Patient Position: Sitting   Cuff Size: Standard   Pulse: 70   Resp: 18   Temp: 97 6 °F (36 4 °C)   TempSrc: Temporal   SpO2: 99%   Weight: 87 8 kg (193 lb 9 6 oz)   Height: 5' 9 75" (1 772 m)      Physical Exam  Constitutional:       Appearance: He is well-developed  HENT:      Head: Normocephalic and atraumatic  Eyes:      Conjunctiva/sclera: Conjunctivae normal    Neck:      Thyroid: No thyromegaly  Cardiovascular:      Rate and Rhythm: Normal rate and regular rhythm  Heart sounds: Normal heart sounds  No murmur heard  Pulmonary:      Effort: Pulmonary effort is normal  No respiratory distress  Breath sounds: Normal breath sounds  Musculoskeletal:      Cervical back: Neck supple  Lymphadenopathy:      Cervical: No cervical adenopathy  Skin:     General: Skin is warm and dry  Psychiatric:         Behavior: Behavior normal            Patient's chronic problems that were reviewed today are stable  Recent hospital stays reviewed  Recent labs and imaging reviewed  Recent visits to other providers reviewed  Meds reviewed and no changes made  Appropriate labs and imaging were ordered  Preventive measures appropriate for age and sex were reviewed with patient  Immunizations were updated as appropriate

## 2023-02-13 DIAGNOSIS — E78.5 HYPERLIPIDEMIA, UNSPECIFIED HYPERLIPIDEMIA TYPE: ICD-10-CM

## 2023-02-14 RX ORDER — LOVASTATIN 40 MG/1
40 TABLET ORAL DAILY
Qty: 90 TABLET | Refills: 6 | Status: SHIPPED | OUTPATIENT
Start: 2023-02-14

## 2023-03-21 ENCOUNTER — TELEPHONE (OUTPATIENT)
Dept: FAMILY MEDICINE CLINIC | Facility: CLINIC | Age: 73
End: 2023-03-21

## 2023-03-21 NOTE — TELEPHONE ENCOUNTER
Trouble urinating  Currently In [address]  at [employer]   What else can he do?  [phone]

## 2023-05-09 ENCOUNTER — COMPLETE EYE EXAM (OUTPATIENT)
Dept: URBAN - METROPOLITAN AREA CLINIC 6 | Facility: CLINIC | Age: 73
End: 2023-05-09

## 2023-05-09 DIAGNOSIS — H52.13: ICD-10-CM

## 2023-05-09 DIAGNOSIS — Z96.1: ICD-10-CM

## 2023-05-09 DIAGNOSIS — H26.492: ICD-10-CM

## 2023-05-09 PROCEDURE — 92014 COMPRE OPH EXAM EST PT 1/>: CPT

## 2023-05-09 ASSESSMENT — TONOMETRY
OD_IOP_MMHG: 15
OS_IOP_MMHG: 14

## 2023-05-09 ASSESSMENT — VISUAL ACUITY
OD_SC: 20/20-1
OS_SC: 20/20+2

## 2023-05-11 ENCOUNTER — OFFICE VISIT (OUTPATIENT)
Dept: FAMILY MEDICINE CLINIC | Facility: CLINIC | Age: 73
End: 2023-05-11

## 2023-05-11 ENCOUNTER — APPOINTMENT (OUTPATIENT)
Dept: LAB | Age: 73
End: 2023-05-11

## 2023-05-11 VITALS
OXYGEN SATURATION: 98 % | HEIGHT: 70 IN | TEMPERATURE: 97.8 F | SYSTOLIC BLOOD PRESSURE: 140 MMHG | HEART RATE: 77 BPM | BODY MASS INDEX: 28.37 KG/M2 | DIASTOLIC BLOOD PRESSURE: 90 MMHG | WEIGHT: 198.2 LBS

## 2023-05-11 DIAGNOSIS — Z11.59 NEED FOR HEPATITIS C SCREENING TEST: ICD-10-CM

## 2023-05-11 DIAGNOSIS — N40.1 BENIGN PROSTATIC HYPERPLASIA WITH LOWER URINARY TRACT SYMPTOMS, SYMPTOM DETAILS UNSPECIFIED: ICD-10-CM

## 2023-05-11 DIAGNOSIS — J30.9 ALLERGIC RHINITIS, UNSPECIFIED SEASONALITY, UNSPECIFIED TRIGGER: Primary | Chronic | ICD-10-CM

## 2023-05-11 DIAGNOSIS — Z23 ENCOUNTER FOR IMMUNIZATION: ICD-10-CM

## 2023-05-11 LAB
BILIRUB UR QL STRIP: NEGATIVE
CLARITY UR: CLEAR
COLOR UR: NORMAL
GLUCOSE UR STRIP-MCNC: NEGATIVE MG/DL
HGB UR QL STRIP.AUTO: NEGATIVE
KETONES UR STRIP-MCNC: NEGATIVE MG/DL
LEUKOCYTE ESTERASE UR QL STRIP: NEGATIVE
NITRITE UR QL STRIP: NEGATIVE
PH UR STRIP.AUTO: 7 [PH]
PROT UR STRIP-MCNC: NEGATIVE MG/DL
PSA SERPL-MCNC: 4.2 NG/ML (ref 0–4)
SL AMB  POCT GLUCOSE, UA: ABNORMAL
SL AMB LEUKOCYTE ESTERASE,UA: ABNORMAL
SL AMB POCT BILIRUBIN,UA: ABNORMAL
SL AMB POCT BLOOD,UA: ABNORMAL
SL AMB POCT CLARITY,UA: YELLOW
SL AMB POCT COLOR,UA: CLEAR
SL AMB POCT KETONES,UA: ABNORMAL
SL AMB POCT NITRITE,UA: ABNORMAL
SL AMB POCT PH,UA: 5
SL AMB POCT SPECIFIC GRAVITY,UA: 1.01
SL AMB POCT URINE PROTEIN: 15
SL AMB POCT UROBILINOGEN: 0.2
SP GR UR STRIP.AUTO: 1.01 (ref 1–1.03)
UROBILINOGEN UR STRIP-ACNC: <2 MG/DL

## 2023-05-11 NOTE — PATIENT INSTRUCTIONS
"KEEP ALCOHOL TO THE FOLLOWING SAFE LIMITS:   MEN : 2 DRINKS PER DAY : LIMIT 14 PER WEEK   WOMEN: 1 DRINK PER DAY : LIMIT 7 PER WEEK  A\"DRINK\" = 12 OZ OF REGULAR BEER; 5 OZ OF WINE OR 1 5 OZ SPIRITS   Medicare Preventive Visit Patient Instructions  Thank you for completing your Welcome to Medicare Visit or Medicare Annual Wellness Visit today  Your next wellness visit will be due in one year (5/11/2024)  The screening/preventive services that you may require over the next 5-10 years are detailed below  Some tests may not apply to you based off risk factors and/or age  Screening tests ordered at today's visit but not completed yet may show as past due  Also, please note that scanned in results may not display below  Preventive Screenings:  Service Recommendations Previous Testing/Comments   Colorectal Cancer Screening  · Colonoscopy    · Fecal Occult Blood Test (FOBT)/Fecal Immunochemical Test (FIT)  · Fecal DNA/Cologuard Test  · Flexible Sigmoidoscopy Age: 39-70 years old   Colonoscopy: every 10 years (May be performed more frequently if at higher risk)  OR  FOBT/FIT: every 1 year  OR  Cologuard: every 3 years  OR  Sigmoidoscopy: every 5 years  Screening may be recommended earlier than age 39 if at higher risk for colorectal cancer  Also, an individualized decision between you and your healthcare provider will decide whether screening between the ages of 74-80 would be appropriate   Colonoscopy: 12/09/2019  FOBT/FIT: Not on file  Cologuard: Not on file  Sigmoidoscopy: Not on file    Screening Current     Prostate Cancer Screening Individualized decision between patient and health care provider in men between ages of 53-78   Medicare will cover every 12 months beginning on the day after your 50th birthday PSA: 2 7 ng/mL     Due for PSA     Hepatitis C Screening Once for adults born between 1945 and 1965  More frequently in patients at high risk for Hepatitis C Hep C Antibody: Not on file    Screening Not Indicated " Diabetes Screening 1-2 times per year if you're at risk for diabetes or have pre-diabetes Fasting glucose: No results in last 5 years (No results in last 5 years)  A1C: 5 3 (9/13/2022)  Screening Current   Cholesterol Screening Once every 5 years if you don't have a lipid disorder  May order more often based on risk factors  Lipid panel: 08/12/2020  Screening Not Indicated  History Lipid Disorder      Other Preventive Screenings Covered by Medicare:  1  Abdominal Aortic Aneurysm (AAA) Screening: covered once if your at risk  You're considered to be at risk if you have a family history of AAA or a male between the age of 73-68 who smoking at least 100 cigarettes in your lifetime  2  Lung Cancer Screening: covers low dose CT scan once per year if you meet all of the following conditions: (1) Age 50-69; (2) No signs or symptoms of lung cancer; (3) Current smoker or have quit smoking within the last 15 years; (4) You have a tobacco smoking history of at least 20 pack years (packs per day x number of years you smoked); (5) You get a written order from a healthcare provider  3  Glaucoma Screening: covered annually if you're considered high risk: (1) You have diabetes OR (2) Family history of glaucoma OR (3)  aged 48 and older OR (3)  American aged 72 and older  3  Osteoporosis Screening: covered every 2 years if you meet one of the following conditions: (1) Have a vertebral abnormality; (2) On glucocorticoid therapy for more than 3 months; (3) Have primary hyperparathyroidism; (4) On osteoporosis medications and need to assess response to drug therapy  5  HIV Screening: covered annually if you're between the age of 12-76  Also covered annually if you are younger than 13 and older than 72 with risk factors for HIV infection  For pregnant patients, it is covered up to 3 times per pregnancy      Immunizations:  Immunization Recommendations   Influenza Vaccine Annual influenza vaccination during flu season is recommended for all persons aged >= 6 months who do not have contraindications   Pneumococcal Vaccine   * Pneumococcal conjugate vaccine = PCV13 (Prevnar 13), PCV15 (Vaxneuvance), PCV20 (Prevnar 20)  * Pneumococcal polysaccharide vaccine = PPSV23 (Pneumovax) Adults 25-60 years old: 1-3 doses may be recommended based on certain risk factors  Adults 72 years old: 1-2 doses may be recommended based off what pneumonia vaccine you previously received   Hepatitis B Vaccine 3 dose series if at intermediate or high risk (ex: diabetes, end stage renal disease, liver disease)   Tetanus (Td) Vaccine - COST NOT COVERED BY MEDICARE PART B Following completion of primary series, a booster dose should be given every 10 years to maintain immunity against tetanus  Td may also be given as tetanus wound prophylaxis  Tdap Vaccine - COST NOT COVERED BY MEDICARE PART B Recommended at least once for all adults  For pregnant patients, recommended with each pregnancy  Shingles Vaccine (Shingrix) - COST NOT COVERED BY MEDICARE PART B  2 shot series recommended in those aged 48 and above     Health Maintenance Due:      Topic Date Due   • Hepatitis C Screening  Never done   • Colorectal Cancer Screening  12/09/2024     Immunizations Due:      Topic Date Due   • COVID-19 Vaccine (4 - Booster for Moderna series) 01/12/2022   • DTaP,Tdap,and Td Vaccines (2 - Td or Tdap) 08/10/2022     Advance Directives   What are advance directives? Advance directives are legal documents that state your wishes and plans for medical care  These plans are made ahead of time in case you lose your ability to make decisions for yourself  Advance directives can apply to any medical decision, such as the treatments you want, and if you want to donate organs  What are the types of advance directives? There are many types of advance directives, and each state has rules about how to use them   You may choose a combination of any of the following:  · Living will: This is a written record of the treatment you want  You can also choose which treatments you do not want, which to limit, and which to stop at a certain time  This includes surgery, medicine, IV fluid, and tube feedings  · Durable power of  for healthcare [facility]): This is a written record that states who you want to make healthcare choices for you when you are unable to make them for yourself  This person, called a proxy, is usually a family member or a friend  You may choose more than 1 proxy  · Do not resuscitate (DNR) order:  A DNR order is used in case your heart stops beating or you stop breathing  It is a request not to have certain forms of treatment, such as CPR  A DNR order may be included in other types of advance directives  · Medical directive: This covers the care that you want if you are in a coma, near death, or unable to make decisions for yourself  You can list the treatments you want for each condition  Treatment may include pain medicine, surgery, blood transfusions, dialysis, IV or tube feedings, and a ventilator (breathing machine)  · Values history: This document has questions about your views, beliefs, and how you feel and think about life  This information can help others choose the care that you would choose  Why are advance directives important? An advance directive helps you control your care  Although spoken wishes may be used, it is better to have your wishes written down  Spoken wishes can be misunderstood, or not followed  Treatments may be given even if you do not want them  An advance directive may make it easier for your family to make difficult choices about your care  Cigarette Smoking and Your Health   Risks to your health if you smoke:  Nicotine and other chemicals found in tobacco damage every cell in your body  Even if you are a light smoker, you have an increased risk for cancer, heart disease, and lung disease   If you are pregnant or have diabetes, smoking increases your risk for complications  Benefits to your health if you stop smoking:   · You decrease respiratory symptoms such as coughing, wheezing, and shortness of breath  · You reduce your risk for cancers of the lung, mouth, throat, kidney, bladder, pancreas, stomach, and cervix  If you already have cancer, you increase the benefits of chemotherapy  You also reduce your risk for cancer returning or a second cancer from developing  · You reduce your risk for heart disease, blood clots, heart attack, and stroke  · You reduce your risk for lung infections, and diseases such as pneumonia, asthma, chronic bronchitis, and emphysema  · Your circulation improves  More oxygen can be delivered to your body  If you have diabetes, you lower your risk for complications, such as kidney, artery, and eye diseases  You also lower your risk for nerve damage  Nerve damage can lead to amputations, poor vision, and blindness  · You improve your body's ability to heal and to fight infections  For more information and support to stop smoking:   · [employer]  Phone: 0- 052 - 434-3589  Web Address: [facility] [place]  How to Quit Using Smokeless Tobacco   Why it is important to stop using smokeless tobacco:  Smokeless tobacco comes in many forms  Examples include chew, snuff, dip, dissolvable tobacco, and snus  All smokeless tobacco products contain nicotine and may contain as much nicotine as 3 cigarettes  You may be physically dependent on nicotine  You may also be emotionally addicted to it  The cravings can be strong, but it is important to quit using smokeless tobacco  You will improve your health and decrease your cancer, stroke, and heart attack risk  Mouth sores and tooth problems will also improve when you quit  You can benefit from quitting no matter how long you have used smokeless tobacco    Prepare to stop using smokeless tobacco:  Nicotine is a highly addictive drug  Withdrawal symptoms can happen when you stop and make it hard to quit  The following can help keep you on track:  · Set a quit date  · Tell friends, family, and coworkers that you plan to quit  · Remove all smokeless tobacco products from your home, car, and workplace  Manage weight gain after you quit:  Nicotine can affect your metabolism  You may gain a few pounds after you quit  The following can help you control your weight:  · Eat healthy foods  · Drink water before, during, and between meals  · Exercise as directed  Weight Management   Why it is important to manage your weight:  Being overweight increases your risk of health conditions such as heart disease, high blood pressure, type 2 diabetes, and certain types of cancer  It can also increase your risk for osteoarthritis, sleep apnea, and other respiratory problems  Aim for a slow, steady weight loss  Even a small amount of weight loss can lower your risk of health problems  How to lose weight safely:  A safe and healthy way to lose weight is to eat fewer calories and get regular exercise  You can lose up about 1 pound a week by decreasing the number of calories you eat by 500 calories each day  Healthy meal plan for weight management:  A healthy meal plan includes a variety of foods, contains fewer calories, and helps you stay healthy  A healthy meal plan includes the following:  · Eat whole-grain foods more often  A healthy meal plan should contain fiber  Fiber is the part of grains, fruits, and vegetables that is not broken down by your body  Whole-grain foods are healthy and provide extra fiber in your diet  Some examples of whole-grain foods are whole-wheat breads and pastas, oatmeal, brown rice, and bulgur  · Eat a variety of vegetables every day  Include dark, leafy greens such as spinach, kale, rafat greens, and mustard greens  Eat yellow and orange vegetables such as carrots, sweet potatoes, and winter squash     · Eat a variety of fruits every day  Choose fresh or canned fruit (canned in its own juice or light syrup) instead of juice  Fruit juice has very little or no fiber  · Eat low-fat dairy foods  Drink fat-free (skim) milk or 1% milk  Eat fat-free yogurt and low-fat cottage cheese  Try low-fat cheeses such as mozzarella and other reduced-fat cheeses  · Choose meat and other protein foods that are low in fat  Choose beans or other legumes such as split peas or lentils  Choose fish, skinless poultry (chicken or turkey), or lean cuts of red meat (beef or pork)  Before you cook meat or poultry, cut off any visible fat  · Use less fat and oil  Try baking foods instead of frying them  Add less fat, such as margarine, sour cream, regular salad dressing and mayonnaise to foods  Eat fewer high-fat foods  Some examples of high-fat foods include french fries, doughnuts, ice cream, and cakes  · Eat fewer sweets  Limit foods and drinks that are high in sugar  This includes candy, cookies, regular soda, and sweetened drinks  Exercise:  Exercise at least 30 minutes per day on most days of the week  Some examples of exercise include walking, biking, dancing, and swimming  You can also fit in more physical activity by taking the stairs instead of the elevator or parking farther away from stores  Ask your healthcare provider about the best exercise plan for you  © Copyright [employer] 2018 Information is for End User's use only and may not be sold, redistributed or otherwise used for commercial purposes   All illustrations and images included in CareNotes® are the copyrighted property of A D A M , Inc  or [address] [place] [address]

## 2023-05-11 NOTE — PROGRESS NOTES
Assessment and Plan:     Problem List Items Addressed This Visit    None  Visit Diagnoses     Need for hepatitis C screening test        Encounter for immunization               Preventive health issues were discussed with patient, and age appropriate screening tests were ordered as noted in patient's After Visit Summary  Personalized health advice and appropriate referrals for health education or preventive services given if needed, as noted in patient's After Visit Summary  History of Present Illness:     Patient presents for a Medicare Wellness Visit    HPI   Patient Care Team:  Diallo Jules MD as PCP - General     Review of Systems:     Review of Systems     Problem List:     Patient Active Problem List   Diagnosis   • HLD (hyperlipidemia)   • Allergic rhinitis   • Hyperglycemia   • Medicare annual wellness visit, subsequent   • COVID-19      Past Medical and Surgical History:     Past Medical History:   Diagnosis Date   • Dupuytren contracture     bilateral     Past Surgical History:   Procedure Laterality Date   • COLONOSCOPY W/ POLYPECTOMY  09/23/2004   • HAND SURGERY Bilateral 12/2008    DUPUYTRENS CONTRACTURE SURGICAL RELEASE      Family History:     Family History   Problem Relation Age of Onset   • Coronary artery disease Mother    • Cirrhosis Father    • Diabetes type II Family         multiple relatives   • Heart disease Family         ASCVD      Social History:     Social History     Socioeconomic History   • Marital status: /Civil Union     Spouse name: None   • Number of children: None   • Years of education: None   • Highest education level: None   Occupational History   • None   Tobacco Use   • Smoking status: Some Days     Types: Cigars     Start date: 1980   • Smokeless tobacco: Current   • Tobacco comments:     cigar   Vaping Use   • Vaping Use: Never used   Substance and Sexual Activity   • Alcohol use:  Yes     Alcohol/week: 4 0 standard drinks     Types: 2 Glasses of wine, 2 Shots of liquor per week     Comment: socially   • Drug use: No   • Sexual activity: Not Currently   Other Topics Concern   • None   Social History Narrative   • None     Social Determinants of Health     Financial Resource Strain: Low Risk    • Difficulty of Paying Living Expenses: Not hard at all   Food Insecurity: Not on file   Transportation Needs: No Transportation Needs   • Lack of Transportation (Medical): No   • Lack of Transportation (Non-Medical): No   Physical Activity: Not on file   Stress: Not on file   Social Connections: Not on file   Intimate Partner Violence: Not on file   Housing Stability: Not on file      Medications and Allergies:     Current Outpatient Medications   Medication Sig Dispense Refill   • lovastatin (MEVACOR) 40 MG tablet Take 1 tablet (40 mg total) by mouth daily 90 tablet 6     No current facility-administered medications for this visit  No Known Allergies   Immunizations:     Immunization History   Administered Date(s) Administered   • COVID-19 MODERNA VACC 0 25 ML IM BOOSTER 11/17/2021   • COVID-19 MODERNA VACC 0 5 ML IM 12/31/2020, 01/26/2021   • INFLUENZA 09/26/2018, 10/15/2021   • Influenza Split High Dose Preservative Free IM 09/26/2018, 09/28/2019   • Influenza, high dose seasonal 0 7 mL 10/13/2020, 10/15/2021   • Pneumococcal Conjugate 13-Valent 08/15/2016   • Pneumococcal Polysaccharide PPV23 08/23/2017   • Td (adult), Unspecified 08/14/2005   • Td (adult), adsorbed 08/30/2005   • Tdap 08/10/2012   • Zoster 12/31/2015   • Zoster Vaccine Recombinant 10/08/2019, 12/11/2019      Health Maintenance:         Topic Date Due   • Hepatitis C Screening  Never done   • Colorectal Cancer Screening  12/09/2024         Topic Date Due   • COVID-19 Vaccine (4 - Booster for Moderna series) 01/12/2022   • DTaP,Tdap,and Td Vaccines (2 - Td or Tdap) 08/10/2022      Medicare Screening Tests and Risk Assessments:         Health Risk Assessment:   Patient rates overall health as good  Patient feels that their physical health rating is slightly worse  Patient is satisfied with their life  Eyesight was rated as same  Hearing was rated as same  Patient feels that their emotional and mental health rating is slightly worse  Patients states they are never, rarely angry  Patient states they are sometimes unusually tired/fatigued  Pain experienced in the last 7 days has been some  Patient's pain rating has been 2/10  Patient states that he has experienced no weight loss or gain in last 6 months  Depression Screening:   PHQ-2 Score: 0      Fall Risk Screening: In the past year, patient has experienced: no history of falling in past year      Home Safety:  Patient does not have trouble with stairs inside or outside of their home  Patient has working smoke alarms and has working carbon monoxide detector  Home safety hazards include: none  Nutrition:   Current diet is Regular  Medications:   Patient is not currently taking any over-the-counter supplements  Patient is able to manage medications  Activities of Daily Living (ADLs)/Instrumental Activities of Daily Living (IADLs):   Walk and transfer into and out of bed and chair?: Yes  Dress and groom yourself?: Yes    Bathe or shower yourself?: Yes    Feed yourself? Yes  Do your laundry/housekeeping?: Yes  Manage your money, pay your bills and track your expenses?: Yes  Make your own meals?: Yes    Do your own shopping?: Yes    Previous Hospitalizations:   Any hospitalizations or ED visits within the last 12 months?: No      Advance Care Planning:   Living will: Yes    Durable POA for healthcare: Yes    Advanced directive: Yes      Screening, Brief Intervention, and Referral to Treatment (SBIRT)    Screening  Typical number of drinks in a day: 1  Typical number of drinks in a week: 5  Interpretation: Low risk drinking behavior      AUDIT-C Screenin) How often did you have a drink containing alcohol in the past year? 2 to 3 times a week  2) "How many drinks did you have on a typical day when you were drinking in the past year? 1 to 2  3) How often did you have 6 or more drinks on one occasion in the past year? never    AUDIT-C Score: 3  Interpretation: Score 0-3 (male): Negative screen for alcohol misuse    Single Item Drug Screening:  How often have you used an illegal drug (including marijuana) or a prescription medication for non-medical reasons in the past year? never    Single Item Drug Screen Score: 0  Interpretation: Negative screen for possible drug use disorder    No results found       Physical Exam:     /90 (BP Location: Left arm, Patient Position: Sitting, Cuff Size: Standard)   Pulse 77   Temp 97 8 °F (36 6 °C) (Temporal)   Ht 5' 9 5\" (1 765 m)   Wt 89 9 kg (198 lb 3 2 oz)   SpO2 98%   BMI 28 85 kg/m²     Physical Exam     Simeon Blanc MD  "

## 2023-05-11 NOTE — PROGRESS NOTES
"Assessment/Plan:    No problem-specific Assessment & Plan notes found for this encounter  Diagnoses and all orders for this visit:    Allergic rhinitis, unspecified seasonality, unspecified trigger    Need for hepatitis C screening test    Encounter for immunization          Subjective:      Patient ID: Kate Diaz is a 71 y o  male  F/U urinary retention; Had single episode of urinary retention requiring AZO several doses; this solved the issue ; no blood / STD sx /     Since then : clear BPH - type sx ;       AWV/initial      The following portions of the patient's history were reviewed and updated as appropriate: allergies, current medications, past family history, past medical history, past social history, past surgical history and problem list     Review of Systems   Genitourinary: Positive for difficulty urinating and frequency  Negative for dysuria, hematuria, penile discharge, scrotal swelling and testicular pain  Answers for HPI/ROS submitted by the patient on 5/10/2023  How would you rate your overall health?: good  Compared to last year, how is your physical health?: slightly worse  In general, how satisfied are you with your life?: satisfied  Compared to last year, how is your eyesight?: same  Compared to last year, how is your hearing?: same  Compared to last year, how is your emotional/mental health?: slightly worse  How often is anger a problem for you?: never, rarely  How often do you feel unusually tired/fatigued?: sometimes  In the past 7 days, how much pain have you experienced?: some  If you answered \"some\" or \"a lot\", please rate the severity of your pain on a scale of 1 to 10 (1 being the least severe pain and 10 being the most intense pain)  : 2/10  In the past 6 months, have you lost or gained 10 pounds without trying?: No  One or more falls in the last year: No  Do you have trouble with the stairs inside or outside your home?: No  Does your home have working smoke alarms?: " "Yes  Does your home have a carbon monoxide monitor?: Yes  Which safety hazards (if any) have you experienced in your home? Please select all that apply : none  How would you describe your current diet?  Please select all that apply : Regular  In addition to prescription medications, are you taking any over-the-counter supplements?: No  Can you manage your medications?: Yes  Are you currently taking any opioid medications?: No  Can you walk and transfer into and out of your bed and chair?: Yes  Can you dress and groom yourself?: Yes  Can you bathe or shower yourself?: Yes  Can you feed yourself?: Yes  Can you do your laundry/ housekeeping?: Yes  Can you manage your money, pay your bills, and track your expenses?: Yes  Can you make your own meals?: Yes  Can you do your own shopping?: Yes  Within the last 12 months, have you had any hospitalizations or Emergency Department visits?: No  Do you have a living will?: Yes  Do you have a Durable POA (Power of ) for healthcare decisions?: Yes  Do you have an Advanced Directive for end of life decisions?: Yes  How often have you used an illegal drug (including marijuana) or a prescription medication for non-medical reasons in the past year?: never  What is the typical number of drinks you consume in a day?: 1  What is the typical number of drinks you consume in a week?: 5  How often did you have a drink containing alcohol in the past year?: 2 to 3 times a week  How many drinks did you have on a typical day  when you were drinking in the past year?: 1 to 2  How often did you have 6 or more drinks on one occasion in the past year?: never      Objective:  Vitals:    05/11/23 1350   BP: 140/90   BP Location: Left arm   Patient Position: Sitting   Cuff Size: Standard   Pulse: 77   Temp: 97 8 °F (36 6 °C)   TempSrc: Temporal   SpO2: 98%   Weight: 89 9 kg (198 lb 3 2 oz)   Height: 5' 9 5\" (1 765 m)      Physical Exam  Genitourinary:     Comments: Symmetrically enlarged " "prostate   No nodules         Answers for HPI/ROS submitted by the patient on 5/10/2023  How would you rate your overall health?: good  Compared to last year, how is your physical health?: slightly worse  In general, how satisfied are you with your life?: satisfied  Compared to last year, how is your eyesight?: same  Compared to last year, how is your hearing?: same  Compared to last year, how is your emotional/mental health?: slightly worse  How often is anger a problem for you?: never, rarely  How often do you feel unusually tired/fatigued?: sometimes  In the past 7 days, how much pain have you experienced?: some  If you answered \"some\" or \"a lot\", please rate the severity of your pain on a scale of 1 to 10 (1 being the least severe pain and 10 being the most intense pain)  : 2/10  In the past 6 months, have you lost or gained 10 pounds without trying?: No  One or more falls in the last year: No  Do you have trouble with the stairs inside or outside your home?: No  Does your home have working smoke alarms?: Yes  Does your home have a carbon monoxide monitor?: Yes  Which safety hazards (if any) have you experienced in your home? Please select all that apply : none  How would you describe your current diet?  Please select all that apply : Regular  In addition to prescription medications, are you taking any over-the-counter supplements?: No  Can you manage your medications?: Yes  Are you currently taking any opioid medications?: No  Can you walk and transfer into and out of your bed and chair?: Yes  Can you dress and groom yourself?: Yes  Can you bathe or shower yourself?: Yes  Can you feed yourself?: Yes  Can you do your laundry/ housekeeping?: Yes  Can you manage your money, pay your bills, and track your expenses?: Yes  Can you make your own meals?: Yes  Can you do your own shopping?: Yes  Within the last 12 months, have you had any hospitalizations or Emergency Department visits?: No  Do you have a living will?: " Yes  Do you have a Durable POA (Power of ) for healthcare decisions?: Yes  Do you have an Advanced Directive for end of life decisions?: Yes  How often have you used an illegal drug (including marijuana) or a prescription medication for non-medical reasons in the past year?: never  What is the typical number of drinks you consume in a day?: 1  What is the typical number of drinks you consume in a week?: 5  How often did you have a drink containing alcohol in the past year?: 2 to 3 times a week  How many drinks did you have on a typical day  when you were drinking in the past year?: 1 to 2  How often did you have 6 or more drinks on one occasion in the past year?: never

## 2023-05-11 NOTE — PROGRESS NOTES
Assessment and Plan:     Problem List Items Addressed This Visit        Respiratory    Allergic rhinitis - Primary (Chronic)   Other Visit Diagnoses     Need for hepatitis C screening test        Encounter for immunization               Preventive health issues were discussed with patient, and age appropriate screening tests were ordered as noted in patient's After Visit Summary  Personalized health advice and appropriate referrals for health education or preventive services given if needed, as noted in patient's After Visit Summary  History of Present Illness:     Patient presents for a Medicare Wellness Visit    HPI   Patient Care Team:  Khadar Yanez MD as PCP - General     Review of Systems:     Review of Systems     Problem List:     Patient Active Problem List   Diagnosis   • HLD (hyperlipidemia)   • Allergic rhinitis   • Hyperglycemia   • Medicare annual wellness visit, subsequent   • COVID-19      Past Medical and Surgical History:     Past Medical History:   Diagnosis Date   • Dupuytren contracture     bilateral     Past Surgical History:   Procedure Laterality Date   • COLONOSCOPY W/ POLYPECTOMY  09/23/2004   • HAND SURGERY Bilateral 12/2008    DUPUYTRENS CONTRACTURE SURGICAL RELEASE      Family History:     Family History   Problem Relation Age of Onset   • Coronary artery disease Mother    • Cirrhosis Father    • Diabetes type II Family         multiple relatives   • Heart disease Family         ASCVD      Social History:     Social History     Socioeconomic History   • Marital status: /Civil Union     Spouse name: None   • Number of children: None   • Years of education: None   • Highest education level: None   Occupational History   • None   Tobacco Use   • Smoking status: Some Days     Types: Cigars     Start date: 1980   • Smokeless tobacco: Current   • Tobacco comments:     cigar   Vaping Use   • Vaping Use: Never used   Substance and Sexual Activity   • Alcohol use:  Yes Alcohol/week: 4 0 standard drinks     Types: 2 Glasses of wine, 2 Shots of liquor per week     Comment: socially   • Drug use: No   • Sexual activity: Not Currently   Other Topics Concern   • None   Social History Narrative   • None     Social Determinants of Health     Financial Resource Strain: Low Risk    • Difficulty of Paying Living Expenses: Not hard at all   Food Insecurity: Not on file   Transportation Needs: No Transportation Needs   • Lack of Transportation (Medical): No   • Lack of Transportation (Non-Medical): No   Physical Activity: Not on file   Stress: Not on file   Social Connections: Not on file   Intimate Partner Violence: Not on file   Housing Stability: Not on file      Medications and Allergies:     Current Outpatient Medications   Medication Sig Dispense Refill   • lovastatin (MEVACOR) 40 MG tablet Take 1 tablet (40 mg total) by mouth daily 90 tablet 6     No current facility-administered medications for this visit       No Known Allergies   Immunizations:     Immunization History   Administered Date(s) Administered   • COVID-19 MODERNA VACC 0 25 ML IM BOOSTER 11/17/2021   • COVID-19 MODERNA VACC 0 5 ML IM 12/31/2020, 01/26/2021   • INFLUENZA 09/26/2018, 10/15/2021   • Influenza Split High Dose Preservative Free IM 09/26/2018, 09/28/2019   • Influenza, high dose seasonal 0 7 mL 10/13/2020, 10/15/2021   • Pneumococcal Conjugate 13-Valent 08/15/2016   • Pneumococcal Polysaccharide PPV23 08/23/2017   • Td (adult), Unspecified 08/14/2005   • Td (adult), adsorbed 08/30/2005   • Tdap 08/10/2012   • Zoster 12/31/2015   • Zoster Vaccine Recombinant 10/08/2019, 12/11/2019      Health Maintenance:         Topic Date Due   • Hepatitis C Screening  Never done   • Colorectal Cancer Screening  12/09/2024         Topic Date Due   • COVID-19 Vaccine (4 - Booster for Moderna series) 01/12/2022   • DTaP,Tdap,and Td Vaccines (2 - Td or Tdap) 08/10/2022      Medicare Screening Tests and Risk Assessments:     Alda Abdalla is here for his Initial Wellness visit  Health Risk Assessment:   Patient rates overall health as good  Patient feels that their physical health rating is slightly worse  Patient is satisfied with their life  Eyesight was rated as same  Hearing was rated as same  Patient feels that their emotional and mental health rating is slightly worse  Patients states they are never, rarely angry  Patient states they are sometimes unusually tired/fatigued  Pain experienced in the last 7 days has been some  Patient's pain rating has been 2/10  Patient states that he has experienced no weight loss or gain in last 6 months  Depression Screening:   PHQ-2 Score: 0      Fall Risk Screening: In the past year, patient has experienced: no history of falling in past year      Home Safety:  Patient does not have trouble with stairs inside or outside of their home  Patient has working smoke alarms and has working carbon monoxide detector  Home safety hazards include: none  Nutrition:   Current diet is Regular  Medications:   Patient is not currently taking any over-the-counter supplements  Patient is able to manage medications  Activities of Daily Living (ADLs)/Instrumental Activities of Daily Living (IADLs):   Walk and transfer into and out of bed and chair?: Yes  Dress and groom yourself?: Yes    Bathe or shower yourself?: Yes    Feed yourself? Yes  Do your laundry/housekeeping?: Yes  Manage your money, pay your bills and track your expenses?: Yes  Make your own meals?: Yes    Do your own shopping?: Yes    Previous Hospitalizations:   Any hospitalizations or ED visits within the last 12 months?: No      Hospitalization Comments: Eye/ dental all OK     Advance Care Planning:   Living will: Yes    Durable POA for healthcare:  Yes    Advanced directive: Yes      Cognitive Screening:   Provider or family/friend/caregiver concerned regarding cognition?: No    PREVENTIVE SCREENINGS      Cardiovascular Screening: "General: Screening Not Indicated and History Lipid Disorder      Diabetes Screening:     General: Screening Current      Colorectal Cancer Screening:     General: Screening Current      Prostate Cancer Screening:      Due for: PSA      Abdominal Aortic Aneurysm (AAA) Screening:    Risk factors include: age between 73-67 yo and tobacco use        Lung Cancer Screening:     General: Screening Not Indicated      Hepatitis C Screening:    General: Screening Not Indicated    Screening, Brief Intervention, and Referral to Treatment (SBIRT)    Screening  Typical number of drinks in a day: 1  Typical number of drinks in a week: 5  Interpretation: Low risk drinking behavior  AUDIT-C Screenin) How often did you have a drink containing alcohol in the past year? 2 to 3 times a week  2) How many drinks did you have on a typical day when you were drinking in the past year? 1 to 2  3) How often did you have 6 or more drinks on one occasion in the past year? never    AUDIT-C Score: 3  Interpretation: Score 0-3 (male): Negative screen for alcohol misuse    Single Item Drug Screening:  How often have you used an illegal drug (including marijuana) or a prescription medication for non-medical reasons in the past year? never    Single Item Drug Screen Score: 0  Interpretation: Negative screen for possible drug use disorder    Brief Intervention  Alcohol & drug use screenings were reviewed  No concerns regarding substance use disorder identified  No results found       Physical Exam:     /90 (BP Location: Left arm, Patient Position: Sitting, Cuff Size: Standard)   Pulse 77   Temp 97 8 °F (36 6 °C) (Temporal)   Ht 5' 9 5\" (1 765 m)   Wt 89 9 kg (198 lb 3 2 oz)   SpO2 98%   BMI 28 85 kg/m²     Physical Exam     Jamal Fleming MD  "

## 2023-05-15 ENCOUNTER — TRANSCRIBE ORDERS (OUTPATIENT)
Dept: FAMILY MEDICINE CLINIC | Facility: CLINIC | Age: 73
End: 2023-05-15

## 2023-05-15 DIAGNOSIS — R35.0 BENIGN PROSTATIC HYPERPLASIA WITH URINARY FREQUENCY: Primary | Chronic | ICD-10-CM

## 2023-05-15 DIAGNOSIS — N40.1 BENIGN PROSTATIC HYPERPLASIA WITH URINARY FREQUENCY: Primary | Chronic | ICD-10-CM

## 2023-05-22 ENCOUNTER — TELEPHONE (OUTPATIENT)
Dept: FAMILY MEDICINE CLINIC | Facility: CLINIC | Age: 73
End: 2023-05-22

## 2023-05-22 ENCOUNTER — TELEPHONE (OUTPATIENT)
Dept: UROLOGY | Facility: AMBULATORY SURGERY CENTER | Age: 73
End: 2023-05-22

## 2023-05-22 NOTE — TELEPHONE ENCOUNTER
New Patient    What is the reason for the patient’s appointment?: Benign prostatic hyperplasia with urinary frequency    What office location does the patient prefer?: July     Does patient have Imaging/Lab Results: labs 5/11/23    Have patient records been requested?:  If No, are the records showing in Epic: records in epic       INSURANCE:  Do we accept the patient's insurance or is the patient Self-Pay?: yes     Insurance Provider: [employer]: [id number]  Member ID#: [id number]    ID #-[id number]      HISTORY:   Has the patient had any previous Urologist(s)?: no     Was the patient seen in the ED?: no     Has the patient had any outside testing done?: no     Does the patient have a personal history of cancer?: no

## 2023-05-22 NOTE — TELEPHONE ENCOUNTER
Patient  Called regarding right shoulder pain he has had for months   Spoke about this briefing at last office visit  Pain, no injury,  can't raise above head    exercises and is mindful of it   No trouble playing golf  Please advise

## 2023-06-09 ENCOUNTER — OFFICE VISIT (OUTPATIENT)
Dept: UROLOGY | Facility: MEDICAL CENTER | Age: 73
End: 2023-06-09
Payer: MEDICARE

## 2023-06-09 VITALS
WEIGHT: 198 LBS | BODY MASS INDEX: 28.35 KG/M2 | OXYGEN SATURATION: 99 % | HEIGHT: 70 IN | SYSTOLIC BLOOD PRESSURE: 142 MMHG | HEART RATE: 74 BPM | DIASTOLIC BLOOD PRESSURE: 90 MMHG

## 2023-06-09 DIAGNOSIS — R97.20 ELEVATED PSA: ICD-10-CM

## 2023-06-09 DIAGNOSIS — N40.1 BENIGN PROSTATIC HYPERPLASIA WITH URINARY FREQUENCY: Primary | Chronic | ICD-10-CM

## 2023-06-09 DIAGNOSIS — R35.0 BENIGN PROSTATIC HYPERPLASIA WITH URINARY FREQUENCY: Primary | Chronic | ICD-10-CM

## 2023-06-09 LAB — POST-VOID RESIDUAL VOLUME, ML POC: 108 ML

## 2023-06-09 PROCEDURE — 99204 OFFICE O/P NEW MOD 45 MIN: CPT | Performed by: UROLOGY

## 2023-06-09 PROCEDURE — 51798 US URINE CAPACITY MEASURE: CPT | Performed by: UROLOGY

## 2023-06-09 NOTE — ASSESSMENT & PLAN NOTE
Most recent PSA was 4 2 (May 11, 2023)  Causes were discussed  Previous PSAs have been normal   We will plan to recheck a free to total PSA patient in August   He will return after this for follow-up

## 2023-06-09 NOTE — PROGRESS NOTES
Assessment/Plan:    Benign prostatic hyperplasia with lower urinary tract symptoms  AUA symptom score 7  He is pleased with his voiding pattern  Urinalysis is negative  Postvoid residual is 108 cc  Urinary frequency is his most bothersome symptom  Options were discussed  He will try to decrease his fluid intake to see if this improves his frequency  If not we can consider further therapy with an alpha-blocker  He will return in 3 months  Elevated PSA  Most recent PSA was 4 2 (May 11, 2023)  Causes were discussed  Previous PSAs have been normal   We will plan to recheck a free to total PSA patient in August   He will return after this for follow-up  Diagnoses and all orders for this visit:    Benign prostatic hyperplasia with urinary frequency  -     Ambulatory referral to Urology  -     POCT Measure PVR    Elevated PSA  -     PSA, total and free; Future          Subjective:      Patient ID: Lexx Reynolds is a 71 y o  male  Chief complaint: Lower urinary tract symptoms, elevated PSA    HPI: 28-year-old male who noted sudden onset of difficulty voiding in March 2023  He went to the pharmacy and took several doses of Azo  His symptoms gradually improved  At the present time he feels he is voiding more frequently  His stream is adequate and he does feel he empties  He is getting up at most once a night to urinate  There is no urgency or incontinence  He denies gross hematuria, dysuria or symptoms of infection at this time  He did have a recent PSA level was slightly elevated  He is seen today for consultation regarding his lower urinary tract symptoms and the elevated PSA        The following portions of the patient's history were reviewed and updated as appropriate: allergies, current medications, past family history, past medical history, past social history, past surgical history and problem list     Review of Systems   Constitutional: Negative for chills, diaphoresis, fatigue and "fever  HENT: Negative  Eyes: Negative  Respiratory: Negative  Cardiovascular: Negative  Gastrointestinal:        Recent change in bowel habits   Endocrine: Negative  Genitourinary:        See HPI   Musculoskeletal: Negative  Skin: Negative  Allergic/Immunologic: Negative  Neurological: Negative  Hematological: Negative  Psychiatric/Behavioral: Negative  AUA SYMPTOM SCORE    Flowsheet Row Most Recent Value   AUA SYMPTOM SCORE    How often have you had a sensation of not emptying your bladder completely after you finished urinating? 1 (P)     How often have you had to urinate again less than two hours after you finished urinating? 1 (P)     How often have you found you stopped and started again several times when you urinate? 2 (P)     How often have you found it difficult to postpone urination? 0 (P)     How often have you had a weak urinary stream? 1 (P)     How often have you had to push or strain to begin urination? 1 (P)     How many times did you most typically get up to urinate from the time you went to bed at night until the time you got up in the morning? 1 (P)     Quality of Life: If you were to spend the rest of your life with your urinary condition just the way it is now, how would you feel about that? 1 (P)     AUA SYMPTOM SCORE 7 (P)         Objective:      /90   Pulse 74   Ht 5' 9 5\" (1 765 m)   Wt 89 8 kg (198 lb)   SpO2 99%   BMI 28 82 kg/m²          Physical Exam  Vitals reviewed  Constitutional:       General: He is not in acute distress  Appearance: Normal appearance  He is well-developed and normal weight  He is not ill-appearing, toxic-appearing or diaphoretic  HENT:      Head: Normocephalic and atraumatic  Eyes:      General: No scleral icterus  Conjunctiva/sclera: Conjunctivae normal    Cardiovascular:      Rate and Rhythm: Normal rate     Pulmonary:      Effort: Pulmonary effort is normal    Abdominal:      General: Bowel sounds " are normal  There is no distension  Palpations: Abdomen is soft  There is no mass  Tenderness: There is no abdominal tenderness  There is no right CVA tenderness, left CVA tenderness, guarding or rebound  Genitourinary:     Penis: Normal  No phimosis or hypospadias  Testes: Normal          Right: Mass not present  Left: Mass not present  Rectum: Normal       Comments: Spermatocele right epididymis  Prostate moderately enlarged and palpably benign  Musculoskeletal:         General: Normal range of motion  Cervical back: Neck supple  Skin:     General: Skin is warm and dry  Neurological:      General: No focal deficit present  Mental Status: He is alert and oriented to person, place, and time  Psychiatric:         Mood and Affect: Mood normal          Behavior: Behavior normal          Thought Content:  Thought content normal          Judgment: Judgment normal

## 2023-06-09 NOTE — ASSESSMENT & PLAN NOTE
AUA symptom score 7  He is pleased with his voiding pattern  Urinalysis is negative  Postvoid residual is 108 cc  Urinary frequency is his most bothersome symptom  Options were discussed  He will try to decrease his fluid intake to see if this improves his frequency  If not we can consider further therapy with an alpha-blocker  He will return in 3 months

## 2023-08-09 ENCOUNTER — APPOINTMENT (OUTPATIENT)
Dept: LAB | Age: 73
End: 2023-08-09
Payer: MEDICARE

## 2023-08-09 DIAGNOSIS — R97.20 ELEVATED PSA: ICD-10-CM

## 2023-08-09 PROCEDURE — 84154 ASSAY OF PSA FREE: CPT

## 2023-08-09 PROCEDURE — 84153 ASSAY OF PSA TOTAL: CPT

## 2023-08-09 PROCEDURE — 36415 COLL VENOUS BLD VENIPUNCTURE: CPT

## 2023-08-10 LAB
PSA FREE MFR SERPL: 20.3 %
PSA FREE SERPL-MCNC: 1.2 NG/ML
PSA SERPL-MCNC: 5.9 NG/ML (ref 0–4)

## 2023-09-12 ENCOUNTER — OFFICE VISIT (OUTPATIENT)
Dept: UROLOGY | Facility: MEDICAL CENTER | Age: 73
End: 2023-09-12
Payer: MEDICARE

## 2023-09-12 VITALS
DIASTOLIC BLOOD PRESSURE: 80 MMHG | HEART RATE: 71 BPM | BODY MASS INDEX: 28.35 KG/M2 | SYSTOLIC BLOOD PRESSURE: 120 MMHG | HEIGHT: 70 IN | WEIGHT: 198 LBS | OXYGEN SATURATION: 98 %

## 2023-09-12 DIAGNOSIS — N13.8 BENIGN PROSTATIC HYPERPLASIA WITH URINARY OBSTRUCTION: Primary | Chronic | ICD-10-CM

## 2023-09-12 DIAGNOSIS — N40.1 BENIGN PROSTATIC HYPERPLASIA WITH URINARY OBSTRUCTION: Primary | Chronic | ICD-10-CM

## 2023-09-12 DIAGNOSIS — R97.20 ELEVATED PSA: ICD-10-CM

## 2023-09-12 PROCEDURE — 99214 OFFICE O/P EST MOD 30 MIN: CPT | Performed by: UROLOGY

## 2023-09-12 NOTE — ASSESSMENT & PLAN NOTE
PSA continues to rise with most recent PSA 5.9. Risk of prostate cancer is estimated at 25%. Options were discussed. I recommended the patient that we obtain a multiparametric MRI for further evaluation. Depending on results we can then consider transperineal prostate biopsies.

## 2023-09-12 NOTE — PROGRESS NOTES
Assessment/Plan:    Elevated PSA  PSA continues to rise with most recent PSA 5.9. Risk of prostate cancer is estimated at 25%. Options were discussed. I recommended the patient that we obtain a multiparametric MRI for further evaluation. Depending on results we can then consider transperineal prostate biopsies. Benign prostatic hyperplasia with lower urinary tract symptoms  AUA symptom score is 16. He is satisfied with his voiding pattern. We will continue to follow with watchful waiting. Diagnoses and all orders for this visit:    Benign prostatic hyperplasia with urinary obstruction    Elevated PSA  -     Basic metabolic panel; Future  -     MRI prostate multiparametric wo w contrast; Future          Subjective:      Patient ID: Lamberto Barens is a 68 y.o. male. Chief complaint: Lower urinary tract symptoms, elevated PSA    HPI: 71-year-old male followed for lower urinary tract symptoms. He was noted at his last visit to have mild PSA elevation. The PSA has been repeated and he returns today for follow-up. He does note he is voiding a little more frequently. He is otherwise satisfied with his voiding pattern. There is no gross hematuria, dysuria or symptoms of infection. The following portions of the patient's history were reviewed and updated as appropriate: allergies, current medications, past family history, past medical history, past social history, past surgical history and problem list.    Review of Systems   Constitutional: Negative for chills, diaphoresis, fatigue and fever. HENT: Negative. Eyes: Negative. Respiratory: Negative. Cardiovascular: Negative. Endocrine: Negative. Genitourinary:        See HPI   Musculoskeletal: Negative. Skin: Negative. Allergic/Immunologic: Negative. Neurological: Negative. Hematological: Negative. Psychiatric/Behavioral: Negative.         AUA SYMPTOM SCORE    Flowsheet Row Most Recent Value   AUA SYMPTOM SCORE    How often have you had a sensation of not emptying your bladder completely after you finished urinating? 3 (P)     How often have you had to urinate again less than two hours after you finished urinating? 2 (P)     How often have you found you stopped and started again several times when you urinate? 3 (P)     How often have you found it difficult to postpone urination? 3 (P)     How often have you had a weak urinary stream? 2 (P)     How often have you had to push or strain to begin urination? 2 (P)     How many times did you most typically get up to urinate from the time you went to bed at night until the time you got up in the morning? 1 (P)     Quality of Life: If you were to spend the rest of your life with your urinary condition just the way it is now, how would you feel about that? 2 (P)     AUA SYMPTOM SCORE 16 (P)         Objective:      /80 (BP Location: Left arm, Patient Position: Sitting, Cuff Size: Large)   Pulse 71   Ht 5' 9.5" (1.765 m)   Wt 89.8 kg (198 lb)   SpO2 98%   BMI 28.82 kg/m²          Physical Exam  Vitals reviewed. Constitutional:       General: He is not in acute distress. Appearance: Normal appearance. He is well-developed and normal weight. He is not ill-appearing, toxic-appearing or diaphoretic. HENT:      Head: Normocephalic and atraumatic. Eyes:      General: No scleral icterus. Conjunctiva/sclera: Conjunctivae normal.   Cardiovascular:      Rate and Rhythm: Normal rate. Pulmonary:      Effort: Pulmonary effort is normal.   Abdominal:      General: There is no distension. Palpations: There is no mass. Tenderness: There is no abdominal tenderness. There is no right CVA tenderness, left CVA tenderness, guarding or rebound. Comments: No hernia   Musculoskeletal:      Cervical back: Neck supple. Skin:     General: Skin is warm and dry. Neurological:      General: No focal deficit present.       Mental Status: He is alert and oriented to person, place, and time. Psychiatric:         Mood and Affect: Mood normal.         Behavior: Behavior normal.         Thought Content:  Thought content normal.         Judgment: Judgment normal.

## 2023-09-12 NOTE — ASSESSMENT & PLAN NOTE
AUA symptom score is 16. He is satisfied with his voiding pattern. We will continue to follow with watchful waiting.

## 2023-09-14 ENCOUNTER — OFFICE VISIT (OUTPATIENT)
Dept: FAMILY MEDICINE CLINIC | Facility: CLINIC | Age: 73
End: 2023-09-14
Payer: MEDICARE

## 2023-09-14 ENCOUNTER — APPOINTMENT (OUTPATIENT)
Dept: LAB | Age: 73
End: 2023-09-14
Payer: MEDICARE

## 2023-09-14 VITALS
OXYGEN SATURATION: 99 % | TEMPERATURE: 97.6 F | DIASTOLIC BLOOD PRESSURE: 80 MMHG | HEIGHT: 70 IN | BODY MASS INDEX: 28.32 KG/M2 | WEIGHT: 197.8 LBS | SYSTOLIC BLOOD PRESSURE: 140 MMHG | HEART RATE: 72 BPM

## 2023-09-14 DIAGNOSIS — N40.1 BENIGN PROSTATIC HYPERPLASIA WITH URINARY OBSTRUCTION: Primary | Chronic | ICD-10-CM

## 2023-09-14 DIAGNOSIS — F41.1 GAD (GENERALIZED ANXIETY DISORDER): ICD-10-CM

## 2023-09-14 DIAGNOSIS — N13.8 BENIGN PROSTATIC HYPERPLASIA WITH URINARY OBSTRUCTION: Primary | Chronic | ICD-10-CM

## 2023-09-14 DIAGNOSIS — E78.5 HYPERLIPIDEMIA, UNSPECIFIED HYPERLIPIDEMIA TYPE: ICD-10-CM

## 2023-09-14 DIAGNOSIS — R73.9 HYPERGLYCEMIA: ICD-10-CM

## 2023-09-14 DIAGNOSIS — R97.20 ELEVATED PSA: ICD-10-CM

## 2023-09-14 LAB
ANION GAP SERPL CALCULATED.3IONS-SCNC: 6 MMOL/L
BUN SERPL-MCNC: 16 MG/DL (ref 5–25)
CALCIUM SERPL-MCNC: 9.3 MG/DL (ref 8.4–10.2)
CHLORIDE SERPL-SCNC: 107 MMOL/L (ref 96–108)
CHOLEST SERPL-MCNC: 195 MG/DL
CO2 SERPL-SCNC: 27 MMOL/L (ref 21–32)
CREAT SERPL-MCNC: 0.9 MG/DL (ref 0.6–1.3)
GFR SERPL CREATININE-BSD FRML MDRD: 85 ML/MIN/1.73SQ M
GLUCOSE P FAST SERPL-MCNC: 89 MG/DL (ref 65–99)
HDLC SERPL-MCNC: 46 MG/DL
LDLC SERPL CALC-MCNC: 119 MG/DL (ref 0–100)
NONHDLC SERPL-MCNC: 149 MG/DL
POTASSIUM SERPL-SCNC: 4.3 MMOL/L (ref 3.5–5.3)
SODIUM SERPL-SCNC: 140 MMOL/L (ref 135–147)
TRIGL SERPL-MCNC: 150 MG/DL

## 2023-09-14 PROCEDURE — 80048 BASIC METABOLIC PNL TOTAL CA: CPT

## 2023-09-14 PROCEDURE — 36415 COLL VENOUS BLD VENIPUNCTURE: CPT

## 2023-09-14 PROCEDURE — 99214 OFFICE O/P EST MOD 30 MIN: CPT | Performed by: FAMILY MEDICINE

## 2023-09-14 PROCEDURE — 83036 HEMOGLOBIN GLYCOSYLATED A1C: CPT

## 2023-09-14 PROCEDURE — 80061 LIPID PANEL: CPT

## 2023-09-14 RX ORDER — ESCITALOPRAM OXALATE 5 MG/1
5 TABLET ORAL DAILY
Qty: 30 TABLET | Refills: 5 | Status: SHIPPED | OUTPATIENT
Start: 2023-09-14

## 2023-09-14 NOTE — ASSESSMENT & PLAN NOTE
Worsening  · Ongoing for the past year and getting worse in the setting of elevated PSA with concern for prostate cancer  · ARTEMIO-7: 11 mild/moderate anxiety  · Discussed treatment options and patient is agreeable to start pharmacologic treatment. Patient would likely benefit from SSRI.   · Start Lexapro 5 mg daily and follow-up in 1 month

## 2023-09-14 NOTE — PATIENT INSTRUCTIONS
Medicare Preventive Visit Patient Instructions  Thank you for completing your Welcome to Medicare Visit or Medicare Annual Wellness Visit today. Your next wellness visit will be due in one year (9/14/2024). The screening/preventive services that you may require over the next 5-10 years are detailed below. Some tests may not apply to you based off risk factors and/or age. Screening tests ordered at today's visit but not completed yet may show as past due. Also, please note that scanned in results may not display below. Preventive Screenings:  Service Recommendations Previous Testing/Comments   Colorectal Cancer Screening  · Colonoscopy    · Fecal Occult Blood Test (FOBT)/Fecal Immunochemical Test (FIT)  · Fecal DNA/Cologuard Test  · Flexible Sigmoidoscopy Age: 43-73 years old   Colonoscopy: every 10 years (May be performed more frequently if at higher risk)  OR  FOBT/FIT: every 1 year  OR  Cologuard: every 3 years  OR  Sigmoidoscopy: every 5 years  Screening may be recommended earlier than age 39 if at higher risk for colorectal cancer. Also, an individualized decision between you and your healthcare provider will decide whether screening between the ages of 77-80 would be appropriate.  Colonoscopy: 12/09/2019  FOBT/FIT: Not on file  Cologuard: Not on file  Sigmoidoscopy: Not on file    Screening Current     Prostate Cancer Screening Individualized decision between patient and health care provider in men between ages of 53-66   Medicare will cover every 12 months beginning on the day after your 50th birthday PSA: 5.9 ng/mL     Screening Current     Hepatitis C Screening Once for adults born between [address] [place] [address]  More frequently in patients at high risk for Hepatitis C Hep C Antibody: Not on file        Diabetes Screening 1-2 times per year if you're at risk for diabetes or have pre-diabetes Fasting glucose: No results in last 5 years (No results in last 5 years)  A1C: 5.3 (9/13/2022)      Cholesterol Screening Once every 5 years if you don't have a lipid disorder. May order more often based on risk factors. Lipid panel: 08/12/2020  Screening Not Indicated  History Lipid Disorder      Other Preventive Screenings Covered by Medicare:  1. Abdominal Aortic Aneurysm (AAA) Screening: covered once if your at risk. You're considered to be at risk if you have a family history of AAA or a male between the age of 70-76 who smoking at least 100 cigarettes in your lifetime. 2. Lung Cancer Screening: covers low dose CT scan once per year if you meet all of the following conditions: (1) Age 48-67; (2) No signs or symptoms of lung cancer; (3) Current smoker or have quit smoking within the last 15 years; (4) You have a tobacco smoking history of at least 20 pack years (packs per day x number of years you smoked); (5) You get a written order from a healthcare provider. 3. Glaucoma Screening: covered annually if you're considered high risk: (1) You have diabetes OR (2) Family history of glaucoma OR (3)  aged 48 and older OR (3)  American aged 72 and older  3. Osteoporosis Screening: covered every 2 years if you meet one of the following conditions: (1) Have a vertebral abnormality; (2) On glucocorticoid therapy for more than 3 months; (3) Have primary hyperparathyroidism; (4) On osteoporosis medications and need to assess response to drug therapy. 5. HIV Screening: covered annually if you're between the age of 14-79. Also covered annually if you are younger than 13 and older than 72 with risk factors for HIV infection. For pregnant patients, it is covered up to 3 times per pregnancy.     Immunizations:  Immunization Recommendations   Influenza Vaccine Annual influenza vaccination during flu season is recommended for all persons aged >= 6 months who do not have contraindications   Pneumococcal Vaccine   * Pneumococcal conjugate vaccine = PCV13 (Prevnar 13), PCV15 (Vaxneuvance), PCV20 (Prevnar 20)  * Pneumococcal polysaccharide vaccine = PPSV23 (Pneumovax) Adults 2364 years old: 1-3 doses may be recommended based on certain risk factors  Adults 72 years old: 1-2 doses may be recommended based off what pneumonia vaccine you previously received   Hepatitis B Vaccine 3 dose series if at intermediate or high risk (ex: diabetes, end stage renal disease, liver disease)   Tetanus (Td) Vaccine - COST NOT COVERED BY MEDICARE PART B Following completion of primary series, a booster dose should be given every 10 years to maintain immunity against tetanus. Td may also be given as tetanus wound prophylaxis. Tdap Vaccine - COST NOT COVERED BY MEDICARE PART B Recommended at least once for all adults. For pregnant patients, recommended with each pregnancy. Shingles Vaccine (Shingrix) - COST NOT COVERED BY MEDICARE PART B  2 shot series recommended in those aged 48 and above     Health Maintenance Due:      Topic Date Due   • Hepatitis C Screening  Never done   • Colorectal Cancer Screening  12/09/2024     Immunizations Due:      Topic Date Due   • COVID-19 Vaccine (5 - Moderna series) 01/12/2022   • DTaP,Tdap,and Td Vaccines (2 - Td or Tdap) 08/10/2022   • Influenza Vaccine (1) 09/01/2023     Advance Directives   What are advance directives? Advance directives are legal documents that state your wishes and plans for medical care. These plans are made ahead of time in case you lose your ability to make decisions for yourself. Advance directives can apply to any medical decision, such as the treatments you want, and if you want to donate organs. What are the types of advance directives? There are many types of advance directives, and each state has rules about how to use them. You may choose a combination of any of the following:  · Living will: This is a written record of the treatment you want. You can also choose which treatments you do not want, which to limit, and which to stop at a certain time.  This includes surgery, medicine, IV fluid, and tube feedings. · Durable power of  for healthcare [facility]): This is a written record that states who you want to make healthcare choices for you when you are unable to make them for yourself. This person, called a proxy, is usually a family member or a friend. You may choose more than 1 proxy. · Do not resuscitate (DNR) order:  A DNR order is used in case your heart stops beating or you stop breathing. It is a request not to have certain forms of treatment, such as CPR. A DNR order may be included in other types of advance directives. · Medical directive: This covers the care that you want if you are in a coma, near death, or unable to make decisions for yourself. You can list the treatments you want for each condition. Treatment may include pain medicine, surgery, blood transfusions, dialysis, IV or tube feedings, and a ventilator (breathing machine). · Values history: This document has questions about your views, beliefs, and how you feel and think about life. This information can help others choose the care that you would choose. Why are advance directives important? An advance directive helps you control your care. Although spoken wishes may be used, it is better to have your wishes written down. Spoken wishes can be misunderstood, or not followed. Treatments may be given even if you do not want them. An advance directive may make it easier for your family to make difficult choices about your care. Cigarette Smoking and Your Health   Risks to your health if you smoke:  Nicotine and other chemicals found in tobacco damage every cell in your body. Even if you are a light smoker, you have an increased risk for cancer, heart disease, and lung disease. If you are pregnant or have diabetes, smoking increases your risk for complications. Benefits to your health if you stop smoking:   · You decrease respiratory symptoms such as coughing, wheezing, and shortness of breath. · You reduce your risk for cancers of the lung, mouth, throat, kidney, bladder, pancreas, stomach, and cervix. If you already have cancer, you increase the benefits of chemotherapy. You also reduce your risk for cancer returning or a second cancer from developing. · You reduce your risk for heart disease, blood clots, heart attack, and stroke. · You reduce your risk for lung infections, and diseases such as pneumonia, asthma, chronic bronchitis, and emphysema. · Your circulation improves. More oxygen can be delivered to your body. If you have diabetes, you lower your risk for complications, such as kidney, artery, and eye diseases. You also lower your risk for nerve damage. Nerve damage can lead to amputations, poor vision, and blindness. · You improve your body's ability to heal and to fight infections. For more information and support to stop smoking:   · [employer]. [employer]  Phone: 5- 127 - 311-7456  Web Address: www.[employer]  Weight Management   Why it is important to manage your weight:  Being overweight increases your risk of health conditions such as heart disease, high blood pressure, type 2 diabetes, and certain types of cancer. It can also increase your risk for osteoarthritis, sleep apnea, and other respiratory problems. Aim for a slow, steady weight loss. Even a small amount of weight loss can lower your risk of health problems. How to lose weight safely:  A safe and healthy way to lose weight is to eat fewer calories and get regular exercise. You can lose up about 1 pound a week by decreasing the number of calories you eat by 500 calories each day. Healthy meal plan for weight management:  A healthy meal plan includes a variety of foods, contains fewer calories, and helps you stay healthy. A healthy meal plan includes the following:  · Eat whole-grain foods more often. A healthy meal plan should contain fiber.  Fiber is the part of grains, fruits, and vegetables that is not broken down by your body. Whole-grain foods are healthy and provide extra fiber in your diet. Some examples of whole-grain foods are whole-wheat breads and pastas, oatmeal, brown rice, and bulgur. · Eat a variety of vegetables every day. Include dark, leafy greens such as spinach, kale, rafat greens, and mustard greens. Eat yellow and orange vegetables such as carrots, sweet potatoes, and winter squash. · Eat a variety of fruits every day. Choose fresh or canned fruit (canned in its own juice or light syrup) instead of juice. Fruit juice has very little or no fiber. · Eat low-fat dairy foods. Drink fat-free (skim) milk or 1% milk. Eat fat-free yogurt and low-fat cottage cheese. Try low-fat cheeses such as mozzarella and other reduced-fat cheeses. · Choose meat and other protein foods that are low in fat. Choose beans or other legumes such as split peas or lentils. Choose fish, skinless poultry (chicken or turkey), or lean cuts of red meat (beef or pork). Before you cook meat or poultry, cut off any visible fat. · Use less fat and oil. Try baking foods instead of frying them. Add less fat, such as margarine, sour cream, regular salad dressing and mayonnaise to foods. Eat fewer high-fat foods. Some examples of high-fat foods include french fries, doughnuts, ice cream, and cakes. · Eat fewer sweets. Limit foods and drinks that are high in sugar. This includes candy, cookies, regular soda, and sweetened drinks. Exercise:  Exercise at least 30 minutes per day on most days of the week. Some examples of exercise include walking, biking, dancing, and swimming. You can also fit in more physical activity by taking the stairs instead of the elevator or parking farther away from stores. Ask your healthcare provider about the best exercise plan for you. © Copyright [employer] 2018 Information is for End User's use only and may not be sold, redistributed or otherwise used for commercial purposes.  All illustrations and images included in CareNotes® are the copyrighted property of [employer].A[employer]M., Inc. or [address] [place] [address]

## 2023-09-14 NOTE — ASSESSMENT & PLAN NOTE
· Follows with urology and has plans for prostate MRI  · PSAs peaked at 5.7  · Patient has never had an MRI and due to anxiety I offered patient pretreatment with Ativan, patient declines and will try without.

## 2023-09-14 NOTE — PROGRESS NOTES
Assessment/Plan:      1. Benign prostatic hyperplasia with urinary obstruction  Assessment & Plan:  Follows with urology and has plans for prostate MRI  PSAs peaked at 5.7  Patient has never had an MRI and due to anxiety I offered patient pretreatment with Ativan, patient declines and will try without. 2. Hyperlipidemia, unspecified hyperlipidemia type  Assessment & Plan:  Remains on lovastatin 40 mg daily  Recheck lipid panel    Orders:  -     Lipid panel; Future    3. Hyperglycemia  Assessment & Plan:  Patient is due for repeat A1c    Orders:  -     HEMOGLOBIN A1C W/ EAG ESTIMATION; Future    4. ARTEMIO (generalized anxiety disorder)  Assessment & Plan:  Worsening  Ongoing for the past year and getting worse in the setting of elevated PSA with concern for prostate cancer  ARTEMIO-7: 11 mild/moderate anxiety  Discussed treatment options and patient is agreeable to start pharmacologic treatment. Patient would likely benefit from SSRI. Start Lexapro 5 mg daily and follow-up in 1 month      Orders:  -     escitalopram (LEXAPRO) 5 mg tablet; Take 1 tablet (5 mg total) by mouth daily            Subjective:      Patient ID: Vandana Ji is a 68 y.o. male presents today to discuss anxiety. He reports progressive anxiety since covid. He is worried about his elevated psa levels. HPI    The following portions of the patient's history were reviewed and updated as appropriate: allergies, current medications, past family history, past medical history, past social history, past surgical history and problem list.    Review of Systems   Constitutional: Negative for activity change, chills, diaphoresis and fever. HENT: Negative for ear pain, hearing loss, postnasal drip, rhinorrhea, sinus pressure, sinus pain, sneezing and sore throat. Respiratory: Negative for cough, chest tightness, shortness of breath and wheezing. Cardiovascular: Negative for chest pain, palpitations and leg swelling.    Gastrointestinal: Negative for abdominal pain, blood in stool, constipation, diarrhea, nausea and vomiting. Genitourinary: Negative for dysuria, frequency, hematuria and urgency. Musculoskeletal: Negative for arthralgias and myalgias. Neurological: Negative for dizziness, syncope, weakness, light-headedness, numbness and headaches. Psychiatric/Behavioral: The patient is nervous/anxious. Objective:      /80 (BP Location: Left arm, Patient Position: Sitting, Cuff Size: Large)   Pulse 72   Temp 97.6 °F (36.4 °C) (Temporal)   Ht 5' 10" (1.778 m)   Wt 89.7 kg (197 lb 12.8 oz)   SpO2 99%   BMI 28.38 kg/m²          Physical Exam  Vitals reviewed. Constitutional:       General: He is not in acute distress. Appearance: He is well-developed. He is not diaphoretic. HENT:      Head: Normocephalic and atraumatic. Right Ear: External ear normal.      Left Ear: External ear normal.      Nose: Nose normal. No congestion. Mouth/Throat:      Mouth: Mucous membranes are moist.      Pharynx: Oropharynx is clear. No oropharyngeal exudate. Eyes:      General: No scleral icterus. Pupils: Pupils are equal, round, and reactive to light. Neck:      Thyroid: No thyromegaly. Vascular: No JVD. Trachea: No tracheal deviation. Cardiovascular:      Rate and Rhythm: Normal rate and regular rhythm. Pulses: Normal pulses. Heart sounds: Normal heart sounds. No murmur heard. No friction rub. Pulmonary:      Effort: Pulmonary effort is normal. No respiratory distress. Breath sounds: Normal breath sounds. No wheezing or rales. Chest:      Chest wall: No tenderness. Abdominal:      General: Bowel sounds are normal. There is no distension. Palpations: Abdomen is soft. Tenderness: There is no abdominal tenderness. There is no right CVA tenderness, left CVA tenderness, guarding or rebound. Musculoskeletal:         General: No tenderness. Normal range of motion. Cervical back: Normal range of motion and neck supple. No tenderness. Right lower leg: No edema. Left lower leg: No edema. Lymphadenopathy:      Cervical: No cervical adenopathy. Skin:     General: Skin is warm and dry. Neurological:      Mental Status: He is alert and oriented to person, place, and time. Mental status is at baseline. Deep Tendon Reflexes: Reflexes are normal and symmetric. Psychiatric:         Mood and Affect: Mood normal.         Behavior: Behavior normal.         Thought Content:  Thought content normal.         Judgment: Judgment normal.

## 2023-09-15 LAB
EST. AVERAGE GLUCOSE BLD GHB EST-MCNC: 103 MG/DL
HBA1C MFR BLD: 5.2 %

## 2023-09-28 ENCOUNTER — HOSPITAL ENCOUNTER (OUTPATIENT)
Dept: RADIOLOGY | Age: 73
Discharge: HOME | End: 2023-09-28
Payer: MEDICARE

## 2023-09-28 DIAGNOSIS — R97.20 ELEVATED PSA: ICD-10-CM

## 2023-09-28 PROCEDURE — 72197 MRI PELVIS W/O & W/DYE: CPT

## 2023-09-28 PROCEDURE — G1004 CDSM NDSC: HCPCS

## 2023-09-28 PROCEDURE — A9585 GADOBUTROL INJECTION: HCPCS | Performed by: UROLOGY

## 2023-09-28 PROCEDURE — 76377 3D RENDER W/INTRP POSTPROCES: CPT

## 2023-09-28 RX ORDER — GADOBUTROL 604.72 MG/ML
9 INJECTION INTRAVENOUS
Status: COMPLETED | OUTPATIENT
Start: 2023-09-28 | End: 2023-09-28

## 2023-09-28 RX ADMIN — GADOBUTROL 9 ML: 604.72 INJECTION INTRAVENOUS at 12:44

## 2023-10-06 ENCOUNTER — TELEPHONE (OUTPATIENT)
Age: 73
End: 2023-10-06

## 2023-10-06 NOTE — TELEPHONE ENCOUNTER
Pt managed by Dr. Tab Baires    PT calling for his results of his MRI prostate    Please review and call pt back at [phone]

## 2023-10-07 DIAGNOSIS — F41.1 GAD (GENERALIZED ANXIETY DISORDER): ICD-10-CM

## 2023-10-09 RX ORDER — ESCITALOPRAM OXALATE 5 MG/1
5 TABLET ORAL DAILY
Qty: 90 TABLET | Refills: 2 | Status: SHIPPED | OUTPATIENT
Start: 2023-10-09

## 2023-10-09 NOTE — TELEPHONE ENCOUNTER
Call placed to patient and spoke with him. Informed patient of MRI results. Pt is aware and is asking if there is any medication he should be taking to help shrink the prostate.      Will forward to provider team to review

## 2023-10-09 NOTE — TELEPHONE ENCOUNTER
Results of MRI of prostate as below:    IMPRESSION:     1. PI-RADSv2.1 Category 2 - Low (clinically significant cancer is unlikely to be present).     2. Mild BPH with calculated prostate volume of 50 cc.       Patient to follow-up in the office as scheduled with PSA prior to visit

## 2023-10-12 ENCOUNTER — TELEPHONE (OUTPATIENT)
Dept: UROLOGY | Facility: MEDICAL CENTER | Age: 73
End: 2023-10-12

## 2023-10-12 DIAGNOSIS — R97.20 ELEVATED PSA: Primary | ICD-10-CM

## 2023-10-12 NOTE — TELEPHONE ENCOUNTER
----- Message from Cathy Kearney MD sent at 10/12/2023  2:08 PM EDT -----  MRI is low risk for significant prostate cancer. The prostate is a little big. I would recheck the PSA and obtain a 4K score prior to his next visit. I will place orders in epic. He may need to come in prior to getting the blood work just to get the 4K score papers.

## 2023-10-12 NOTE — TELEPHONE ENCOUNTER
Spoke to pt. He was advised:    MD Ezra Lopez, RN33 minutes ago (1:37 PM)     Prostate is large but he is satisfied with his voiding pattern. I would recheck psa in 6 months and we can then discuss whether to try meds. The meds do have side effects. \ (ED)     Pt stated he will be away until May. He was advised to have PSA done in early March per Dr. Real Villarreal.

## 2023-10-13 NOTE — TELEPHONE ENCOUNTER
Call placed to patient and spoke with him. Informed him of the recommendations of the MD. Pt is aware and will stop by the office on Monday to sign 4k paperwork.

## 2023-10-16 ENCOUNTER — OFFICE VISIT (OUTPATIENT)
Dept: FAMILY MEDICINE CLINIC | Facility: CLINIC | Age: 73
End: 2023-10-16
Payer: MEDICARE

## 2023-10-16 VITALS
HEART RATE: 79 BPM | RESPIRATION RATE: 18 BRPM | DIASTOLIC BLOOD PRESSURE: 80 MMHG | TEMPERATURE: 97.8 F | OXYGEN SATURATION: 98 % | BODY MASS INDEX: 28.75 KG/M2 | WEIGHT: 200.4 LBS | SYSTOLIC BLOOD PRESSURE: 160 MMHG

## 2023-10-16 DIAGNOSIS — Z11.59 NEED FOR HEPATITIS C SCREENING TEST: ICD-10-CM

## 2023-10-16 DIAGNOSIS — F41.1 GAD (GENERALIZED ANXIETY DISORDER): Primary | ICD-10-CM

## 2023-10-16 DIAGNOSIS — E78.5 HYPERLIPIDEMIA, UNSPECIFIED HYPERLIPIDEMIA TYPE: ICD-10-CM

## 2023-10-16 DIAGNOSIS — N13.8 BENIGN PROSTATIC HYPERPLASIA WITH URINARY OBSTRUCTION: Chronic | ICD-10-CM

## 2023-10-16 DIAGNOSIS — N40.1 BENIGN PROSTATIC HYPERPLASIA WITH URINARY OBSTRUCTION: Chronic | ICD-10-CM

## 2023-10-16 DIAGNOSIS — R73.9 HYPERGLYCEMIA: ICD-10-CM

## 2023-10-16 PROCEDURE — 99214 OFFICE O/P EST MOD 30 MIN: CPT | Performed by: FAMILY MEDICINE

## 2023-10-16 NOTE — PROGRESS NOTES
BMI Counseling: Body mass index is 28.75 kg/m². The BMI is above normal. Nutrition recommendations include decreasing portion sizes, encouraging healthy choices of fruits and vegetables, decreasing fast food intake, limiting drinks that contain sugar, moderation in carbohydrate intake, reducing intake of saturated and trans fat and reducing intake of cholesterol. Exercise recommendations include moderate physical activity 150 minutes/week and exercising 3-5 times per week. Rationale for BMI follow-up plan is due to patient being overweight or obese. Depression Screening and Follow-up Plan: Patient was screened for depression during today's encounter. They screened negative with a PHQ-2 score of 0. Assessment/Plan:      1. ARTEMIO (generalized anxiety disorder)  Assessment & Plan:  Much improved  Patient is tolerating Lexapro 5 mg daily  ARTEMIO-7: Improved from 11 to 3  Continue current dose      2. Benign prostatic hyperplasia with urinary obstruction  Assessment & Plan:  Continues to follow closely with urology  Hide stable MRI of prostate  Need repeat to monitor stability      3. Hyperglycemia  -     HEMOGLOBIN A1C W/ EAG ESTIMATION; Future  -     Comprehensive metabolic panel; Future  -     Albumin / creatinine urine ratio; Future    4. Hyperlipidemia, unspecified hyperlipidemia type  Assessment & Plan:  Stable on lovastatin 40 mg daily    Orders:  -     Lipid panel; Future    5. Need for hepatitis C screening test  -     Hepatitis C antibody; Future            Subjective:      Patient ID: Maria Dolores Weinberg is a 68 y.o. male presents today for follow up. He is doing well with lexapro 5mg and denies side effects. He feels like his anxiety is better controlled. Anxiety  Patient reports no chest pain, dizziness, nausea, palpitations or shortness of breath.            The following portions of the patient's history were reviewed and updated as appropriate: allergies, current medications, past family history, past medical history, past social history, past surgical history, and problem list.    Review of Systems   Constitutional:  Negative for activity change, chills, diaphoresis and fever. HENT:  Negative for ear pain, hearing loss, postnasal drip, rhinorrhea, sinus pressure, sinus pain, sneezing and sore throat. Respiratory:  Negative for cough, chest tightness, shortness of breath and wheezing. Cardiovascular:  Negative for chest pain, palpitations and leg swelling. Gastrointestinal:  Negative for abdominal pain, blood in stool, constipation, diarrhea, nausea and vomiting. Genitourinary:  Negative for dysuria, frequency, hematuria and urgency. Musculoskeletal:  Negative for arthralgias and myalgias. Neurological:  Negative for dizziness, syncope, weakness, light-headedness, numbness and headaches. Objective:      /80 (BP Location: Left arm, Patient Position: Sitting, Cuff Size: Standard)   Pulse 79   Temp 97.8 °F (36.6 °C) (Temporal)   Resp 18   Wt 90.9 kg (200 lb 6.4 oz)   SpO2 98%   BMI 28.75 kg/m²          Physical Exam  Vitals reviewed. Constitutional:       General: He is not in acute distress. Appearance: He is well-developed. He is not diaphoretic. HENT:      Head: Normocephalic and atraumatic. Right Ear: External ear normal.      Left Ear: External ear normal.      Nose: Nose normal. No congestion. Mouth/Throat:      Mouth: Mucous membranes are moist.      Pharynx: Oropharynx is clear. No oropharyngeal exudate. Eyes:      General: No scleral icterus. Pupils: Pupils are equal, round, and reactive to light. Neck:      Thyroid: No thyromegaly. Vascular: No JVD. Trachea: No tracheal deviation. Cardiovascular:      Rate and Rhythm: Normal rate and regular rhythm. Pulses: Normal pulses. Heart sounds: Normal heart sounds. No murmur heard. No friction rub.    Pulmonary:      Effort: Pulmonary effort is normal. No respiratory distress. Breath sounds: Normal breath sounds. No wheezing or rales. Chest:      Chest wall: No tenderness. Abdominal:      General: Bowel sounds are normal. There is no distension. Palpations: Abdomen is soft. Tenderness: There is no abdominal tenderness. There is no right CVA tenderness, left CVA tenderness, guarding or rebound. Musculoskeletal:         General: No tenderness. Normal range of motion. Cervical back: Normal range of motion and neck supple. No tenderness. Right lower leg: No edema. Left lower leg: No edema. Lymphadenopathy:      Cervical: No cervical adenopathy. Skin:     General: Skin is warm and dry. Neurological:      Mental Status: He is alert and oriented to person, place, and time. Mental status is at baseline. Deep Tendon Reflexes: Reflexes are normal and symmetric. Psychiatric:         Mood and Affect: Mood normal.         Behavior: Behavior normal.         Thought Content:  Thought content normal.         Judgment: Judgment normal.

## 2023-10-17 NOTE — ASSESSMENT & PLAN NOTE
Much improved  Patient is tolerating Lexapro 5 mg daily  ARTEMIO-7: Improved from 11 to 3  Continue current dose

## 2023-10-17 NOTE — ASSESSMENT & PLAN NOTE
Continues to follow closely with urology  Hide stable MRI of prostate  Need repeat to monitor stability

## 2023-10-19 ENCOUNTER — LAB (OUTPATIENT)
Dept: LAB | Age: 73
End: 2023-10-19
Payer: COMMERCIAL

## 2023-10-19 DIAGNOSIS — R97.20 ELEVATED PSA: ICD-10-CM

## 2023-10-19 LAB — PSA SERPL-MCNC: 4.76 NG/ML (ref 0–4)

## 2023-10-19 PROCEDURE — 84153 ASSAY OF PSA TOTAL: CPT

## 2023-10-19 PROCEDURE — 36415 COLL VENOUS BLD VENIPUNCTURE: CPT

## 2023-10-19 NOTE — TELEPHONE ENCOUNTER
Call placed to patient and spoke with him. Advised him that [facility] lab testing sites can drawn 4K testing. Pt states he went to [facility] and they could not drawn the lab because they said "they don't do that there". Advised patient that every [facility] lab center is equipped to drawn this lab and will clarify further. Pt is aware he needs to have this testing completed prior to his May appointment.

## 2023-10-19 NOTE — TELEPHONE ENCOUNTER
Patient calling to find out if he should have the 4k done prior to his follow up    Informed him as per note from Dr. Roni Jolly that he should have it done prior and a PSA.      Patient wanting to know where he is supposed to go for the 4k test    Patient requesting a call back at [phone]

## 2023-10-20 NOTE — RESULT ENCOUNTER NOTE
Inform pt of abnormal test result. PSA is a little better than 2 months ago. Await the 4K score. Was that done?

## 2023-12-11 ENCOUNTER — APPOINTMENT (OUTPATIENT)
Dept: LAB | Facility: CLINIC | Age: 73
End: 2023-12-11
Payer: MEDICARE

## 2023-12-11 DIAGNOSIS — R73.9 HYPERGLYCEMIA: ICD-10-CM

## 2023-12-11 DIAGNOSIS — Z11.59 NEED FOR HEPATITIS C SCREENING TEST: ICD-10-CM

## 2023-12-11 DIAGNOSIS — R97.20 ELEVATED PROSTATE SPECIFIC ANTIGEN (PSA): ICD-10-CM

## 2023-12-11 DIAGNOSIS — E78.5 HYPERLIPIDEMIA, UNSPECIFIED HYPERLIPIDEMIA TYPE: ICD-10-CM

## 2023-12-11 PROCEDURE — 36415 COLL VENOUS BLD VENIPUNCTURE: CPT

## 2023-12-12 LAB — MISCELLANEOUS LAB TEST RESULT: NORMAL

## 2023-12-26 ENCOUNTER — NURSE TRIAGE (OUTPATIENT)
Dept: UROLOGY | Facility: MEDICAL CENTER | Age: 73
End: 2023-12-26

## 2023-12-26 NOTE — TELEPHONE ENCOUNTER
Please review 4K score and advise on how you wish to proceed.  Patient has a follow up appointment in May.

## 2023-12-27 ENCOUNTER — PREP FOR PROCEDURE (OUTPATIENT)
Dept: UROLOGY | Facility: MEDICAL CENTER | Age: 73
End: 2023-12-27

## 2023-12-27 DIAGNOSIS — R97.20 ELEVATED PSA: Primary | ICD-10-CM

## 2024-01-03 ENCOUNTER — TELEPHONE (OUTPATIENT)
Age: 74
End: 2024-01-03

## 2024-01-03 NOTE — TELEPHONE ENCOUNTER
Patient calling in regarding a 4k prostate test billing question that he has. Patient was given contact number for billing office and will be reaching out to them regarding this.

## 2024-01-17 ENCOUNTER — TELEPHONE (OUTPATIENT)
Dept: UROLOGY | Facility: MEDICAL CENTER | Age: 74
End: 2024-01-17

## 2024-01-17 NOTE — TELEPHONE ENCOUNTER
I spoke to the patient and scheduled his procedure for 2/23/2024 at [facility] with Dr. Chris    -instructions given verbally and mailed  -patient aware to be NPO, needs a  and use an enema 1 hour prior to leaving the house morning of procedure  -CBC, CMP, Urine C&S and EKG 2 weeks prior  -PO/JG - 3/4/2024 (virtual as pt will be in [place])

## 2024-02-09 ENCOUNTER — APPOINTMENT (OUTPATIENT)
Dept: LAB | Age: 74
End: 2024-02-09
Payer: MEDICARE

## 2024-02-09 DIAGNOSIS — R97.20 ELEVATED PSA: ICD-10-CM

## 2024-02-09 LAB
ALBUMIN SERPL BCP-MCNC: 4.2 G/DL (ref 3.5–5)
ALP SERPL-CCNC: 56 U/L (ref 34–104)
ALT SERPL W P-5'-P-CCNC: 16 U/L (ref 7–52)
ANION GAP SERPL CALCULATED.3IONS-SCNC: 7 MMOL/L
AST SERPL W P-5'-P-CCNC: 15 U/L (ref 13–39)
BASOPHILS # BLD AUTO: 0.03 THOUSANDS/ÂΜL (ref 0–0.1)
BASOPHILS NFR BLD AUTO: 1 % (ref 0–1)
BILIRUB SERPL-MCNC: 0.61 MG/DL (ref 0.2–1)
BILIRUB UR QL STRIP: NEGATIVE
BUN SERPL-MCNC: 12 MG/DL (ref 5–25)
CALCIUM SERPL-MCNC: 9.3 MG/DL (ref 8.4–10.2)
CHLORIDE SERPL-SCNC: 106 MMOL/L (ref 96–108)
CLARITY UR: CLEAR
CO2 SERPL-SCNC: 26 MMOL/L (ref 21–32)
COLOR UR: NORMAL
CREAT SERPL-MCNC: 0.88 MG/DL (ref 0.6–1.3)
EOSINOPHIL # BLD AUTO: 0.08 THOUSAND/ÂΜL (ref 0–0.61)
EOSINOPHIL NFR BLD AUTO: 2 % (ref 0–6)
ERYTHROCYTE [DISTWIDTH] IN BLOOD BY AUTOMATED COUNT: 12.6 % (ref 11.6–15.1)
GFR SERPL CREATININE-BSD FRML MDRD: 85 ML/MIN/1.73SQ M
GLUCOSE SERPL-MCNC: 102 MG/DL (ref 65–140)
GLUCOSE UR STRIP-MCNC: NEGATIVE MG/DL
HCT VFR BLD AUTO: 44.7 % (ref 36.5–49.3)
HGB BLD-MCNC: 15.3 G/DL (ref 12–17)
HGB UR QL STRIP.AUTO: NEGATIVE
IMM GRANULOCYTES # BLD AUTO: 0.02 THOUSAND/UL (ref 0–0.2)
IMM GRANULOCYTES NFR BLD AUTO: 0 % (ref 0–2)
KETONES UR STRIP-MCNC: NEGATIVE MG/DL
LEUKOCYTE ESTERASE UR QL STRIP: NEGATIVE
LYMPHOCYTES # BLD AUTO: 1.44 THOUSANDS/ÂΜL (ref 0.6–4.47)
LYMPHOCYTES NFR BLD AUTO: 29 % (ref 14–44)
MCH RBC QN AUTO: 32.1 PG (ref 26.8–34.3)
MCHC RBC AUTO-ENTMCNC: 34.2 G/DL (ref 31.4–37.4)
MCV RBC AUTO: 94 FL (ref 82–98)
MONOCYTES # BLD AUTO: 0.46 THOUSAND/ÂΜL (ref 0.17–1.22)
MONOCYTES NFR BLD AUTO: 9 % (ref 4–12)
NEUTROPHILS # BLD AUTO: 3 THOUSANDS/ÂΜL (ref 1.85–7.62)
NEUTS SEG NFR BLD AUTO: 59 % (ref 43–75)
NITRITE UR QL STRIP: NEGATIVE
NRBC BLD AUTO-RTO: 0 /100 WBCS
PH UR STRIP.AUTO: 6.5 [PH]
PLATELET # BLD AUTO: 189 THOUSANDS/UL (ref 149–390)
PMV BLD AUTO: 10.9 FL (ref 8.9–12.7)
POTASSIUM SERPL-SCNC: 4.4 MMOL/L (ref 3.5–5.3)
PROT SERPL-MCNC: 6.3 G/DL (ref 6.4–8.4)
PROT UR STRIP-MCNC: NEGATIVE MG/DL
RBC # BLD AUTO: 4.77 MILLION/UL (ref 3.88–5.62)
SODIUM SERPL-SCNC: 139 MMOL/L (ref 135–147)
SP GR UR STRIP.AUTO: 1.02 (ref 1–1.03)
UROBILINOGEN UR STRIP-ACNC: <2 MG/DL
WBC # BLD AUTO: 5.03 THOUSAND/UL (ref 4.31–10.16)

## 2024-02-09 PROCEDURE — 85025 COMPLETE CBC W/AUTO DIFF WBC: CPT

## 2024-02-09 PROCEDURE — 36415 COLL VENOUS BLD VENIPUNCTURE: CPT

## 2024-02-09 PROCEDURE — 87086 URINE CULTURE/COLONY COUNT: CPT

## 2024-02-09 PROCEDURE — 80053 COMPREHEN METABOLIC PANEL: CPT

## 2024-02-09 PROCEDURE — 93005 ELECTROCARDIOGRAM TRACING: CPT

## 2024-02-10 LAB — BACTERIA UR CULT: ABNORMAL

## 2024-02-12 LAB
ATRIAL RATE: 71 BPM
P AXIS: 26 DEGREES
PR INTERVAL: 144 MS
QRS AXIS: 35 DEGREES
QRSD INTERVAL: 88 MS
QT INTERVAL: 398 MS
QTC INTERVAL: 432 MS
T WAVE AXIS: 30 DEGREES
VENTRICULAR RATE: 71 BPM

## 2024-02-15 NOTE — PRE-PROCEDURE INSTRUCTIONS
Pre-Surgery Instructions:   Medication Instructions    escitalopram (LEXAPRO) 5 mg tablet Take day of surgery.    lovastatin (MEVACOR) 40 MG tablet Take day of surgery.   Has instructions for fleet enema day of surgery    Medication instructions for day surgery reviewed. Please use only a sip of water to take your instructed medications. Avoid all over the counter vitamins, supplements and NSAIDS for one week prior to surgery per anesthesia guidelines. Tylenol is ok to take as needed.     You will receive a call one business day prior to surgery with an arrival time and hospital directions. If your surgery is scheduled on a Monday, the hospital will be calling you on the Friday prior to your surgery. If you have not heard from anyone by 8pm, please call the hospital supervisor through the hospital  at [phone]. ([place] [phone] or [place] [phone]).    Do not eat or drink anything after midnight the night before your surgery, including candy, mints, lifesavers, or chewing gum. Do not drink alcohol 24hrs before your surgery. Try not to smoke at least 24hrs before your surgery.       Follow the pre surgery showering instructions as listed in the “My Surgical Experience Booklet” or otherwise provided by your surgeon's office. Do not use a blade to shave the surgical area 1 week before surgery. It is okay to use a clean electric clippers up to 24 hours before surgery. Do not apply any lotions, creams, including makeup, cologne, deodorant, or perfumes after showering on the day of your surgery. Do not use dry shampoo, hair spray, hair gel, or any type of hair products.     No contact lenses, eye make-up, or artificial eyelashes. Remove nail polish, including gel polish, and any artificial, gel, or acrylic nails if possible. Remove all jewelry including rings and body piercing jewelry.     Wear causal clothing that is easy to take on and off. Consider your type of surgery.    Keep any valuables,  jewelry, piercings at home. Please bring any specially ordered equipment (sling, braces) if indicated.    Arrange for a responsible person to drive you to and from the hospital on the day of your surgery. Visitor Guidelines discussed.     Call the surgeon's office with any new illnesses, exposures, or additional questions prior to surgery.    Please reference your “My Surgical Experience Booklet” for additional information to prepare for your upcoming surgery.

## 2024-02-20 ENCOUNTER — VBI (OUTPATIENT)
Dept: ADMINISTRATIVE | Facility: OTHER | Age: 74
End: 2024-02-20

## 2024-02-22 ENCOUNTER — ANESTHESIA EVENT (OUTPATIENT)
Dept: PERIOP | Facility: HOSPITAL | Age: 74
End: 2024-02-22
Payer: MEDICARE

## 2024-02-23 ENCOUNTER — ANESTHESIA (OUTPATIENT)
Dept: PERIOP | Facility: HOSPITAL | Age: 74
End: 2024-02-23
Payer: MEDICARE

## 2024-02-23 ENCOUNTER — HOSPITAL ENCOUNTER (OUTPATIENT)
Facility: HOSPITAL | Age: 74
Setting detail: OUTPATIENT SURGERY
Discharge: HOME | End: 2024-02-23
Attending: UROLOGY | Admitting: UROLOGY
Payer: MEDICARE

## 2024-02-23 VITALS
WEIGHT: 208.56 LBS | HEIGHT: 70 IN | OXYGEN SATURATION: 98 % | HEART RATE: 71 BPM | RESPIRATION RATE: 16 BRPM | DIASTOLIC BLOOD PRESSURE: 83 MMHG | TEMPERATURE: 97 F | SYSTOLIC BLOOD PRESSURE: 159 MMHG | BODY MASS INDEX: 29.86 KG/M2

## 2024-02-23 DIAGNOSIS — R97.20 ELEVATED PSA: ICD-10-CM

## 2024-02-23 PROCEDURE — 76942 ECHO GUIDE FOR BIOPSY: CPT | Performed by: UROLOGY

## 2024-02-23 PROCEDURE — 88344 IMHCHEM/IMCYTCHM EA MLT ANTB: CPT | Performed by: PATHOLOGY

## 2024-02-23 PROCEDURE — NC001 PR NO CHARGE: Performed by: UROLOGY

## 2024-02-23 PROCEDURE — 55700 PR PROSTATE NEEDLE BIOPSY ANY APPROACH: CPT | Performed by: UROLOGY

## 2024-02-23 PROCEDURE — G0416 PROSTATE BIOPSY, ANY MTHD: HCPCS | Performed by: PATHOLOGY

## 2024-02-23 RX ORDER — FENTANYL CITRATE/PF 50 MCG/ML
25 SYRINGE (ML) INJECTION
Status: DISCONTINUED | OUTPATIENT
Start: 2024-02-23 | End: 2024-02-23 | Stop reason: HOSPADM

## 2024-02-23 RX ORDER — CEFAZOLIN SODIUM 2 G/50ML
2000 SOLUTION INTRAVENOUS ONCE
Status: COMPLETED | OUTPATIENT
Start: 2024-02-23 | End: 2024-02-23

## 2024-02-23 RX ORDER — LIDOCAINE HYDROCHLORIDE 20 MG/ML
INJECTION, SOLUTION EPIDURAL; INFILTRATION; INTRACAUDAL; PERINEURAL AS NEEDED
Status: DISCONTINUED | OUTPATIENT
Start: 2024-02-23 | End: 2024-02-23 | Stop reason: HOSPADM

## 2024-02-23 RX ORDER — MIDAZOLAM HYDROCHLORIDE 2 MG/2ML
INJECTION, SOLUTION INTRAMUSCULAR; INTRAVENOUS AS NEEDED
Status: DISCONTINUED | OUTPATIENT
Start: 2024-02-23 | End: 2024-02-23

## 2024-02-23 RX ORDER — PROPOFOL 10 MG/ML
INJECTION, EMULSION INTRAVENOUS CONTINUOUS PRN
Status: DISCONTINUED | OUTPATIENT
Start: 2024-02-23 | End: 2024-02-23

## 2024-02-23 RX ORDER — LIDOCAINE HYDROCHLORIDE 20 MG/ML
INJECTION, SOLUTION EPIDURAL; INFILTRATION; INTRACAUDAL; PERINEURAL AS NEEDED
Status: DISCONTINUED | OUTPATIENT
Start: 2024-02-23 | End: 2024-02-23

## 2024-02-23 RX ORDER — PROPOFOL 10 MG/ML
INJECTION, EMULSION INTRAVENOUS AS NEEDED
Status: DISCONTINUED | OUTPATIENT
Start: 2024-02-23 | End: 2024-02-23

## 2024-02-23 RX ORDER — FENTANYL CITRATE 50 UG/ML
INJECTION, SOLUTION INTRAMUSCULAR; INTRAVENOUS AS NEEDED
Status: DISCONTINUED | OUTPATIENT
Start: 2024-02-23 | End: 2024-02-23

## 2024-02-23 RX ORDER — ONDANSETRON 2 MG/ML
4 INJECTION INTRAMUSCULAR; INTRAVENOUS ONCE AS NEEDED
Status: DISCONTINUED | OUTPATIENT
Start: 2024-02-23 | End: 2024-02-23 | Stop reason: HOSPADM

## 2024-02-23 RX ORDER — ALBUTEROL SULFATE 2.5 MG/3ML
2.5 SOLUTION RESPIRATORY (INHALATION) ONCE AS NEEDED
Status: DISCONTINUED | OUTPATIENT
Start: 2024-02-23 | End: 2024-02-23 | Stop reason: HOSPADM

## 2024-02-23 RX ORDER — SODIUM CHLORIDE, SODIUM LACTATE, POTASSIUM CHLORIDE, CALCIUM CHLORIDE 600; 310; 30; 20 MG/100ML; MG/100ML; MG/100ML; MG/100ML
125 INJECTION, SOLUTION INTRAVENOUS CONTINUOUS
Status: DISCONTINUED | OUTPATIENT
Start: 2024-02-23 | End: 2024-02-23 | Stop reason: HOSPADM

## 2024-02-23 RX ADMIN — PROPOFOL 30 MG: 10 INJECTION, EMULSION INTRAVENOUS at 10:35

## 2024-02-23 RX ADMIN — FENTANYL CITRATE 100 MCG: 50 INJECTION INTRAMUSCULAR; INTRAVENOUS at 10:26

## 2024-02-23 RX ADMIN — MIDAZOLAM 2 MG: 1 INJECTION INTRAMUSCULAR; INTRAVENOUS at 10:25

## 2024-02-23 RX ADMIN — LIDOCAINE HYDROCHLORIDE 100 MG: 20 INJECTION, SOLUTION EPIDURAL; INFILTRATION; INTRACAUDAL at 10:27

## 2024-02-23 RX ADMIN — PROPOFOL 50 MG: 10 INJECTION, EMULSION INTRAVENOUS at 10:27

## 2024-02-23 RX ADMIN — SODIUM CHLORIDE, SODIUM LACTATE, POTASSIUM CHLORIDE, AND CALCIUM CHLORIDE 125 ML/HR: .6; .31; .03; .02 INJECTION, SOLUTION INTRAVENOUS at 09:14

## 2024-02-23 RX ADMIN — CEFAZOLIN SODIUM 2000 MG: 2 SOLUTION INTRAVENOUS at 10:26

## 2024-02-23 RX ADMIN — PROPOFOL 100 MCG/KG/MIN: 10 INJECTION, EMULSION INTRAVENOUS at 10:27

## 2024-02-23 NOTE — ANESTHESIA PREPROCEDURE EVALUATION
Procedure:  TRANSPERINEAL ULTRASOUND GUIDED BIOPSY PROSTATE (Perineum)    Relevant Problems   CARDIO   (+) HLD (hyperlipidemia)      /RENAL   (+) Benign prostatic hyperplasia with lower urinary tract symptoms      NEURO/PSYCH   (+) ARTEMIO (generalized anxiety disorder)        Physical Exam    Airway    Mallampati score: III  TM Distance: <3 FB  Neck ROM: full     Dental   Comment: Molar cap     Cardiovascular  Rhythm: regular, Rate: normal, Cardiovascular exam normal    Pulmonary  Pulmonary exam normal Breath sounds clear to auscultation    Other Findings        Anesthesia Plan  ASA Score- 2     Anesthesia Type- IV sedation with anesthesia with ASA Monitors.         Additional Monitors:     Airway Plan:            Plan Factors-Exercise tolerance (METS): >4 METS.    Chart reviewed. EKG reviewed.  Existing labs reviewed. Patient summary reviewed.    Patient is not a current smoker.              Induction- intravenous.    Postoperative Plan-     Informed Consent- Anesthetic plan and risks discussed with patient.

## 2024-02-23 NOTE — H&P
"     Assessment/Plan:  Elevated PSA  Normal MRI    For U/S guided transperineal prostate biopsy.  Procedure reviewed and consent obtained.    Melo Chris MD           Subjective:      Elevated PSA, evaluated by Dr. Deluca. MRI PiRads 2, 50 cc prostate.  4K score 20% chance of prostate cancer.      The following portions of the patient's history were reviewed and updated as appropriate: allergies, current medications, past family history, past medical history, past social history, past surgical history, and problem list.     Review of Systems   Constitutional:  Negative for activity change, chills, diaphoresis and fever.   HENT:  Negative for ear pain, hearing loss, postnasal drip, rhinorrhea, sinus pressure, sinus pain, sneezing and sore throat.    Respiratory:  Negative for cough, chest tightness, shortness of breath and wheezing.    Cardiovascular:  Negative for chest pain, palpitations and leg swelling.   Gastrointestinal:  Negative for abdominal pain, blood in stool, constipation, diarrhea, nausea and vomiting.   Genitourinary:  Negative for dysuria, frequency, hematuria and urgency.   Musculoskeletal:  Negative for arthralgias and myalgias.   Neurological:  Negative for dizziness, syncope, weakness, light-headedness, numbness and headaches.          Objective:     /83   Pulse 69   Temp (!) 96.7 °F (35.9 °C) (Temporal)   Resp 16   Ht 5' 10\" (1.778 m)   Wt 94.6 kg (208 lb 8.9 oz)   SpO2 98%   BMI 29.92 kg/m²          Physical Exam  Vitals reviewed.   Constitutional:       General: He is not in acute distress.     Appearance: He is well-developed. He is not diaphoretic.   HENT:      Head: Normocephalic and atraumatic.      Right Ear: External ear normal.      Left Ear: External ear normal.      Nose: Nose normal. No congestion.      Mouth/Throat:      Mouth: Mucous membranes are moist.      Pharynx: Oropharynx is clear. No oropharyngeal exudate.   Eyes:      General: No scleral icterus.     " Pupils: Pupils are equal, round, and reactive to light.   Neck:      Thyroid: No thyromegaly.      Vascular: No JVD.      Trachea: No tracheal deviation.   Cardiovascular:      Rate and Rhythm: Normal rate and regular rhythm.      Pulses: Normal pulses.      Heart sounds: Normal heart sounds. No murmur heard.     No friction rub.   Pulmonary:      Effort: Pulmonary effort is normal. No respiratory distress.      Breath sounds: Normal breath sounds. No wheezing or rales.   Chest:      Chest wall: No tenderness.   Abdominal:      General: Bowel sounds are normal. There is no distension.      Palpations: Abdomen is soft.      Tenderness: There is no abdominal tenderness. There is no right CVA tenderness, left CVA tenderness, guarding or rebound.   Musculoskeletal:         General: No tenderness. Normal range of motion.      Cervical back: Normal range of motion and neck supple. No tenderness.      Right lower leg: No edema.      Left lower leg: No edema.   Lymphadenopathy:      Cervical: No cervical adenopathy.   Skin:     General: Skin is warm and dry.   Neurological:      Mental Status: He is alert and oriented to person, place, and time. Mental status is at baseline.      Deep Tendon Reflexes: Reflexes are normal and symmetric.   Psychiatric:         Mood and Affect: Mood normal.         Behavior: Behavior normal.         Thought Content: Thought content normal.         Judgment: Judgment normal.

## 2024-02-23 NOTE — OP NOTE
OPERATIVE REPORT  PATIENT NAME: Ezio Muir    :  1950  MRN: [id number]  Pt Location: [facility] OR ROOM 04    SURGERY DATE: 2024    Surgeons and Role:     * Melo Chris MD - Primary    Preop Diagnosis:  Elevated PSA [R97.20]    Post-Op Diagnosis Codes:     * Elevated PSA [R97.20]    Procedure(s):  TRANSPERINEAL ULTRASOUND GUIDED BIOPSY PROSTATE    Specimen(s):  * No specimens in log *    Estimated Blood Loss:   Minimal    Drains:  * No LDAs found *    Anesthesia Type:   Choice    Operative Indications:  Elevated PSA [R97.20]      Operative Findings:  Normal appearance    Complications:   None    Procedure and Technique:  Procedure was transperineal saturation prostate biopsy utilizing the Precision Point perineal access device utilized to map the standard geographic sites of the prostate.    Ezio Muir is a 73 y.o. male with history of elevated PSA. Patient has not undergone prior biopsy of the prostate .    Patient did undergo pre biopsy multiparametric MRI of the prostate. There were 0 lesions with highest PIRADs rating 2 so the patient was scheduled for standard transperineal saturation of the prostate.    The patient was scheduled for his procedure in an outpatient surgical context with the assistance of the anesthesia team for sedation. He was met in the preoperative holding area by the performing urologist, the anesthesia team and the intraoperative nursing staff. The procedure along with its risks and benefits were discussed and reviewed. Patient signed an informed consent.   Patient was then transferred to procedure suite. Pre-procedural time out was performed with all parties present. He was treated with periprocedural antibiotics, ancef. He was placed in dorsal lithotomy with care to pad all pressure points. His perineum and genitalia were shave/prepped with chloroprep. The scrotum was elevated a secured aware from the perineum above the rectum.      Side-fire, biplanar transrectal  ultrasound was introduced and the prostate was imaged in the sagittal and axial views. Prostate gland measurements taken, Height: 5.6cm, Width: 4.5cm, Lenth:4.1cm = volumetric calculation:50    In the midportion of the left and right prostate, a prachi was denoted in the perineal skin. Skin wheal was elevated with 1% lidocaine plain on either side.    The PrecisionPoint access needle was then introduced into the left perineal subcutaneous tissue. We visualized the tip of the needle. Spinal needle was introduced through the subcutaneous fat and into the pelvic floor muscle where a perineal pudendal block was performed with additional local anesthetic. This was repeated on the patient's right side.    The Precision Point access needle and stepper was positioned into the RIGHT perineal space and ultrasound guidance was utilized to place the spring-loaded biopsy needle into the following areas    RIGHT anterior medial: 2 biopsies taken  RIGHT posterior medial: 2 biopsies taken  RIGHT posterior lateral: 2 biopsies taken  RIGHT base: 2 biopsies taken  RIGHT anterior lateral: 2 biopsies taken  The Precision Point access needle and stepper was re-positioned into the LEFT perineal space and ultrasound guidance was utilized to place the spring-loaded biopsy needle into the following areas  LEFT anterior medial: 2 biopsies taken  LEFT posterior medial: 2 biopsies taken  LEFT posterior lateral: 2 biopsies taken  LEFT base: 2 biopsies taken  LEFT anterior lateral: 2 biopsies taken  LEFT anterior medial: 2 biopsies taken    A total of 20 biopsies     Transrectal ultrasound removed. The scrotum was released. Some gentle pressure was placed on the perineum for approximately 5 minutes for hemostasis.    At the completion of the procedure, the patient was taken out of dorsal lithotomy, recovered from their anesthesia, and transferred to PACU in good condition.     Overall, the patient tolerated the procedure and there were no  complications.    Plan: The patient will be monitored in recovery, allowed to void prior to discharge and return for biopsy pathology review in the office with their primary urologist.     I was present for the entire procedure.    Patient Disposition:  PACU         SIGNATURE: Melo Chris MD  DATE: February 23, 2024  TIME: 10:08 AM

## 2024-02-23 NOTE — ANESTHESIA POSTPROCEDURE EVALUATION
"Post-Op Assessment Note    CV Status:  Stable    Pain management: adequate       Mental Status:  Alert and awake   Hydration Status:  Euvolemic   PONV Controlled:  Controlled   Airway Patency:  Patent     Post Op Vitals Reviewed: Yes    No anethesia notable event occurred.    Staff: Anesthesiologist               BP      Temp      Pulse     Resp      SpO2      /83   Pulse 71   Temp (!) 97 °F (36.1 °C) (Temporal)   Resp 16   Ht 5' 10\" (1.778 m)   Wt 94.6 kg (208 lb 8.9 oz)   SpO2 98%   BMI 29.92 kg/m²     "

## 2024-02-27 PROCEDURE — G0416 PROSTATE BIOPSY, ANY MTHD: HCPCS | Performed by: PATHOLOGY

## 2024-02-27 PROCEDURE — 88344 IMHCHEM/IMCYTCHM EA MLT ANTB: CPT | Performed by: PATHOLOGY

## 2024-03-04 ENCOUNTER — TELEMEDICINE (OUTPATIENT)
Dept: UROLOGY | Facility: MEDICAL CENTER | Age: 74
End: 2024-03-04
Payer: MEDICARE

## 2024-03-04 DIAGNOSIS — C61 PROSTATE CANCER (HCC): Primary | ICD-10-CM

## 2024-03-04 PROCEDURE — 99443 PR PHYS/QHP TELEPHONE EVALUATION 21-30 MIN: CPT | Performed by: UROLOGY

## 2024-03-04 NOTE — PROGRESS NOTES
Virtual Brief Visit    This Visit is being completed by telephone. The Patient is located at Home and in the following state in which I hold an active license [place]    The patient was identified by name and date of birth. Ezio ROGERS Muir was informed that this is a telemedicine visit and that the visit is being conducted through the [employer] Teams platform. He agrees to proceed..  My office door was closed. No one else was in the room.  He acknowledged consent and understanding of privacy and security of the video platform. The patient has agreed to participate and understands they can discontinue the visit at any time.    Patient is aware this is a billable service.       Assessment/Plan:    Problem List Items Addressed This Visit    None  Visit Diagnoses       Prostate cancer (HCC)    -  Primary    Relevant Orders    NM PET CT skull base to mid thigh        Virtual visit today is performed to review pathology from his recent transperineal prostate biopsy.  He tolerated the biopsies well.  He is voiding well with only mild initial hematuria every morning.  No fever or chills.  He feels well.  PSA was 5.9.  Multiparametric MRI revealed a 50 g PI-RADS 2 gland.    Pathology revealed prostate cancer with a maximum [place] score of 4+3 equal 7 (grade group 3).  Prostate cancer was noted in 4 of 10 sampled areas.    I reviewed the pathology report with the patient.  [facility] reveals a 93% 15-year survival with treatment.  There is significant risk of extraprostatic extension and seminal vesicle involvement.  There is an 11% risk of lymph node metastasis.    We then reviewed the NCCN guidelines.  He has unfavorable intermediate risk  prostate cancer.  We had an initial discussion regarding treatment options.  I did recommend that prior any further discussion the patient obtain a PSMA PET/CT.  Once this is performed he can return to the office and we will then discuss further therapy.    Recent  Visits  No visits were found meeting these conditions.  Showing recent visits within past 7 days and meeting all other requirements  Today's Visits  Date Type Provider Dept   03/04/24 Telemedicine Judson Deluca MD [facility]   Showing today's visits and meeting all other requirements  Future Appointments  No visits were found meeting these conditions.  Showing future appointments within next 150 days and meeting all other requirements         Visit Time  Total Visit Duration: 25 min

## 2024-03-05 ENCOUNTER — TELEPHONE (OUTPATIENT)
Age: 74
End: 2024-03-05

## 2024-03-05 NOTE — TELEPHONE ENCOUNTER
Pt called stated that the center he wants to o to to get PET scan done requested any prior imaging and lab work.       Fax# [phone]    Please review

## 2024-03-05 NOTE — TELEPHONE ENCOUNTER
Patient is currently out of state. He was looking to get the PET Scan done in [place]. He is going to contact the place he was debating going to get there fax number.     When he calls back, place assist with faxing to the labs for him

## 2024-03-08 NOTE — TELEPHONE ENCOUNTER
Patient called to confirm that his request to have labs and imaging orders sent to fax # [phone] was fulfilled.    I was able to confirm per previous encounter that they have been faxed on 3/5.    No further action is needed.

## 2024-03-26 DIAGNOSIS — C61 PROSTATE CANCER (HCC): Primary | ICD-10-CM

## 2024-04-30 PROBLEM — C61 PROSTATE CANCER (HCC): Status: ACTIVE | Noted: 2024-04-30

## 2024-05-04 DIAGNOSIS — E78.5 HYPERLIPIDEMIA, UNSPECIFIED HYPERLIPIDEMIA TYPE: ICD-10-CM

## 2024-05-05 RX ORDER — LOVASTATIN 40 MG/1
40 TABLET ORAL DAILY
Qty: 90 TABLET | Refills: 1 | Status: SHIPPED | OUTPATIENT
Start: 2024-05-05

## 2024-05-07 ENCOUNTER — RADIATION ONCOLOGY CONSULT (OUTPATIENT)
Dept: RADIATION ONCOLOGY | Facility: HOSPITAL | Age: 74
End: 2024-05-07
Attending: INTERNAL MEDICINE
Payer: MEDICARE

## 2024-05-07 ENCOUNTER — TELEPHONE (OUTPATIENT)
Dept: RADIATION ONCOLOGY | Facility: HOSPITAL | Age: 74
End: 2024-05-07

## 2024-05-07 VITALS
OXYGEN SATURATION: 97 % | SYSTOLIC BLOOD PRESSURE: 157 MMHG | BODY MASS INDEX: 29.06 KG/M2 | DIASTOLIC BLOOD PRESSURE: 91 MMHG | HEART RATE: 97 BPM | TEMPERATURE: 97.8 F | HEIGHT: 70 IN | WEIGHT: 203 LBS

## 2024-05-07 DIAGNOSIS — C61 PROSTATE CANCER (HCC): ICD-10-CM

## 2024-05-07 PROCEDURE — 99205 OFFICE O/P NEW HI 60 MIN: CPT | Performed by: INTERNAL MEDICINE

## 2024-05-07 PROCEDURE — 99211 OFF/OP EST MAY X REQ PHY/QHP: CPT | Performed by: INTERNAL MEDICINE

## 2024-05-07 NOTE — TELEPHONE ENCOUNTER
Ezio Arenas was seen in consult with Dr. Leblanc this morning. He is undecided on RT vs. surgery. He is meeting with urology on 5/15/24 and will make his final decision after that. IF he decides to proceed with radiation, he will require short term ADT, space OAR and fiducial marker placement.     Lisha

## 2024-05-07 NOTE — PROGRESS NOTES
Consultation - Radiation Oncology      Patient Name: Ezio Muir MRN:[id number] : 1950  Encounter: [id number]  Referring Provider: Judson Deluca MD     Cancer Staging   Prostate cancer (HCC)  Staging form: Prostate, AJCC 8th Edition  - Clinical: Stage IIC (cT1c, cN0, cM0, PSA: 5.9, Grade Group: 3) - Signed by Amari Leblanc MD on 2024  Prostate specific antigen (PSA) range: Less than 10  Harrison score: 7  Histologic grading system: 5 grade system    ASSESSMENT & PLAN  Ezio Muir is a 73 y.o. 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 MRI on 23 showed a 50cc gland and was PIRADS2 without a dominant lesion. PSMA PET/CT on 3/21/24 showed hypermetabolic in the left aspect of the prostate gland consistent with primary neoplasm, without evidence of regional or distant disease. MSKCC nomogram-estimated risk of LN involvement 7% and SVI 5%.     I discussed that the patient has unfavorable intermediate risk disease, and reasonable treatment options include radiation therapy with short term ADT as well as prostatectomy. If electing RT, I discussed NRG-, which is a parallel deintensification and intensification clinical trial of systemic therapy for unfavorable intermediate risk prostate cancer.       We discussed external beam radiation therapy with markers and spaceOAR placement, and he is aware of the logistics and possible short- and long-term side effects which include, but are not limited to, fatigue, dermatitis, dysuria, urinary frequency/urgency, rectal urgency, diarrhea, cramping, sexual dysfunction, cystitis, urethral stricture, proctitis, bowel obstruction, fistula, incontinence, urinary retention, and secondary malignancy.    We reviewed pros/cons of surgery vs. RT and Mr. Muir demonstrated a thorough understanding of these approaches. At this time, Mr. Muir continues to consider all options and has agreed to follow-up  with his final treatment decision.  He was given our office contact information and instructions to call with his decision.    Thank you for the opportunity to participate in the care of this patient.    Amari Leblanc MD  Department of Radiation Oncology  [facility]    No orders of the defined types were placed in this encounter.    1. Prostate cancer (HCC)  Ambulatory Referral to Radiation Oncology          Total Time Spent  55 minutes spent reviewing EMR in preparation for visit, with the patient, coordination with other providers, and documentation.    CHIEF COMPLAINT  Chief Complaint   Patient presents with   • Consult       History of Present Illness  Ezio Muir 1950 is a 73 y.o. male recently diagnosed with unfavorable intermediate risk prostate cancer. PSA was 5.9. Multiparametric MRI revealed a 50 g PI-RADS 2 gland. Pathology revealed prostate cancer with a maximum Rodrigo score of 4+3 equal 7 (grade group 3). Prostate cancer was noted in 4 of 10 sampled areas. He presents today for initial consult, referred by Dr. Deluca.      Lab Results   Component Value Date    PSA 4.76 (H) 10/19/2023    PSA 5.9 (H) 08/09/2023    PSA 4.2 (H) 05/11/2023    PSA 2.7 08/12/2020 9/28/23 MRI prostate multiparametric wo w contrast  IMPRESSION:  1. PI-RADSv2.1 Category 2 - Low (clinically significant cancer is unlikely to be present).  2. Mild BPH with calculated prostate volume of 50 cc     12/12/23   PSA total - 5.94  4K score - 18.7     2/23/24 Transperineal ultrasound guided biopsy prostate  (Results below)     3/4/24 Urology - Dr. Deluca, [facility].   PSA was 5.9.  Multiparametric MRI revealed a 50 g PI-RADS 2 gland.  Pathology revealed prostate cancer with a maximum [place] score of 4+3 equal 7 (grade group 3). Prostate cancer was noted in 4 of 10 sampled areas.  unfavorable intermediate risk prostate cancer  recommend that prior any further discussion the patient obtain a PSMA  PET/CT  once this is performed he can return to the office and we will then discuss further therapy      3/21/24 NM PET CT - outside   CONCLUSION:  1.Hypermetabolism within left aspect of prostate gland from mid gland to apex, consistent with primary prostate neoplasm.  2. No metabolic evidence of regional or distant metastatic disease.   3. Subcentimeter pulmonary nodules within the right lung, as noted above . If clinically warranted, consider a follow-up study in approximately 3 months to assess stability.  4. Prominent coronary artery calcification, as noted above. If clinically warranted, consider further evaluation with a coronary CTA and/or cardiology consultation.      Upcomin/15/24 Urology - Dr. Beasley    Today, the patient notes some urinary symptoms. He has difficulty initiating and some frequency. His IPSS is an 8.     Oncology History   Prostate cancer (HCC)   2024 Biopsy    Final Diagnosis   A. Prostate, LEFT ANTERIOR MEDIAL, biopsy:  - Benign prostatic tissue, negative for malignancy      B. Prostate, LEFT ANTERIOR LATERAL, biopsy:  - Prostatic adenocarcinoma, very small focus of poorly formed glands in 1 of 3 cores (cannot be graded) (see note)  - Focal high-grade prostatic intraepithelial neoplasia (HG-PIN)      C. Prostate, LEFT POSTERIOR MEDIAL, biopsy:  - Prostatic adenocarcinoma, involving 2 of 2 cores:              - [place] score 3 + 4 = 7, Pattern 4 = 25%, Prognostic Grade Group 2 continuously involving 5% of each core respectively (see note)              - Negative for periprostatic fat invasion, lymph-vascular invasion and perineural invasion       D. Prostate, LEFT POSTERIOR LATERAL, biopsy:  - Benign prostatic tissue, negative for malignancy       E. Prostate, LEFT BASE, biopsy:  - Prostatic adenocarcinoma, involving 1 of 2 cores:              - Rodrigo score 4 + 3 = 7, Pattern 4 = 60%, Prognostic Grade Group 3 continuously involving 20% of core (see note)              -  Negative for periprostatic fat invasion, lymph-vascular invasion and perineural invasion       F. Prostate, RIGHT ANTERIOR MEDIAL, biopsy:  - Benign prostatic tissue, negative for malignancy       G. Prostate, RIGHT ANTERIOR LATERAL, biopsy:  - Benign prostatic tissue, negative for malignancy       H. Prostate, RIGHT POSTERIOR LATERAL, biopsy:  - High-grade prostatic intraepithelial neoplasia (HG-PIN)       I. Prostate, RIGHT POSTERIOR MEDIAL, biopsy:  - Prostatic adenocarcinoma, involving 2 of 2 cores:              - [place] score 3 + 4 = 7, Pattern 4 = 15%, Prognostic Grade Group 2 discontinuously involving 40% and 15% of each core respectively (see note)              - Perineural invasion in 1 of 2 involved cores              - Negative for periprostatic fat invasion and lymph-vascular invasion        J. Prostate, RIGHT BASE, biopsy:  - Benign prostatic tissue, negative for malignancy          4/30/2024 Initial Diagnosis    Prostate cancer (HCC)     5/8/2024 -  Cancer Staged    Staging form: Prostate, AJCC 8th Edition  - Clinical: Stage IIC (cT1c, cN0, cM0, PSA: 5.9, Grade Group: 3) - Signed by Amari Leblanc MD on 5/8/2024  Prostate specific antigen (PSA) range: Less than 10  [place] score: 7  Histologic grading system: 5 grade system         Historical Information   Past Medical History:   Diagnosis Date   • Anxiety    • Dupuytren contracture     bilateral   • Hyperlipidemia      Past Surgical History:   Procedure Laterality Date   • COLONOSCOPY W/ POLYPECTOMY  09/23/2004   • EYE SURGERY Bilateral November 2020    Cataract and laser on both eyes   • HAND SURGERY Bilateral 12/2008    DUPUYTRENS CONTRACTURE SURGICAL RELEASE   • MT BX PROSTATE STRTCTC SATURATION SAMPLING IMG GID N/A 2/23/2024    Procedure: TRANSPERINEAL ULTRASOUND GUIDED BIOPSY PROSTATE;  Surgeon: Melo Chris MD;  Location: [facility];  Service: Urology     Family History   Problem Relation Age of Onset   • Coronary artery disease  "Mother    • Diabetes Mother    • Cirrhosis Father    • Diabetes type II Family         multiple relatives   • Heart disease Family         ASCVD     Social History   Social History     Substance and Sexual Activity   Alcohol Use Yes   • Alcohol/week: 7.0 standard drinks of alcohol   • Types: 7 Standard drinks or equivalent per week    Comment: With dinner- drinks daily . last drink was      Social History     Substance and Sexual Activity   Drug Use No     Social History     Tobacco Use   Smoking Status Some Days   • Types: Cigars   • Start date:    Smokeless Tobacco Never   Tobacco Comments    Cigar once and a while when I play golf; on average I play two or three times a week... last cigar was 2.5 weeks ago     Meds/Allergies     Current Outpatient Medications:   •  escitalopram (LEXAPRO) 5 mg tablet, TAKE 1 TABLET (5 MG TOTAL) BY MOUTH DAILY. (Patient taking differently: Take 700 mg by mouth daily), Disp: 90 tablet, Rfl: 2  •  lovastatin (MEVACOR) 40 MG tablet, TAKE 1 TABLET BY MOUTH EVERY DAY, Disp: 90 tablet, Rfl: 1  No Known Allergies    Pathology:  See above.    Review of Systems  Refer to nursing note    OBJECTIVE:   /91 (BP Location: Left arm, Patient Position: Sitting, Cuff Size: Large)   Pulse 97   Temp 97.8 °F (36.6 °C) (Temporal)   Ht 5' 10\" (1.778 m)   Wt 92.1 kg (203 lb)   SpO2 97%   BMI 29.13 kg/m²   Pain Assessment:  0  Performance Status: ECO - Asymptomatic    Physical Exam  General Appearance:  Alert, cooperative, no distress, appears stated age  Cardiovascular:  Extremities warm and well perfused  Lungs: Respirations unlabored, no cyanosis, able to speak in complete sentences without dyspnea.  JACQUELYN: Deferred   Skin: No generalized rash or dermatitis  Neurologic: ANOx3, speech and cognition intact.    Portions of the record may have been created with voice recognition software.  Occasional wrong word or \"sound a like\" substitutions may have occurred due to the inherent " limitations of voice recognition software.  Read the chart carefully and recognize, using context, where substitutions have occurred.

## 2024-05-07 NOTE — PROGRESS NOTES
Ezio Muir 1950 is a 73 y.o. maleRecently diagnosed with unfavorable intermediate risk prostate cancer. PSA was 5.9. Multiparametric MRI revealed a 50 g PI-RADS 2 gland. Pathology revealed prostate cancer with a maximum [place] score of 4+3 equal 7 (grade group 3). Prostate cancer was noted in 4 of 10 sampled areas. He presents today for initial consult, referred by Dr. Deluca.     23 MRI prostate multiparametric wo w contrast  IMPRESSION:  1. PI-RADSv2.1 Category 2 - Low (clinically significant cancer is unlikely to be present).  2. Mild BPH with calculated prostate volume of 50 cc    23   PSA total - 5.94  4K score - 18.7    24 Transperineal ultrasound guided biopsy prostate  (Results below)    3/4/24 Urology - Dr. Deluca, [facility].   PSA was 5.9.  Multiparametric MRI revealed a 50 g PI-RADS 2 gland.  Pathology revealed prostate cancer with a maximum Gibson score of 4+3 equal 7 (grade group 3). Prostate cancer was noted in 4 of 10 sampled areas.  unfavorable intermediate risk prostate cancer  recommend that prior any further discussion the patient obtain a PSMA PET/CT  once this is performed he can return to the office and we will then discuss further therapy     3/21/24 NM PET CT - outside   CONCLUSION:  1.Hypermetabolism within left aspect of prostate gland from mid gland to apex, consistent with primary prostate neoplasm.  2. No metabolic evidence of regional or distant metastatic disease.   3. Subcentimeter pulmonary nodules within the right lung, as noted above . If clinically warranted, consider a follow-up study in approximately 3 months to assess stability.  4. Prominent coronary artery calcification, as noted above. If clinically warranted, consider further evaluation with a coronary CTA and/or cardiology consultation.     Upcomin/15/24 Urology - Dr. Beasley      Oncology History   Prostate cancer (HCC)   2024 Biopsy    Final Diagnosis   A. Prostate, LEFT ANTERIOR  MEDIAL, biopsy:  - Benign prostatic tissue, negative for malignancy      B. Prostate, LEFT ANTERIOR LATERAL, biopsy:  - Prostatic adenocarcinoma, very small focus of poorly formed glands in 1 of 3 cores (cannot be graded) (see note)  - Focal high-grade prostatic intraepithelial neoplasia (HG-PIN)      C. Prostate, LEFT POSTERIOR MEDIAL, biopsy:  - Prostatic adenocarcinoma, involving 2 of 2 cores:              - [place] score 3 + 4 = 7, Pattern 4 = 25%, Prognostic Grade Group 2 continuously involving 5% of each core respectively (see note)              - Negative for periprostatic fat invasion, lymph-vascular invasion and perineural invasion       D. Prostate, LEFT POSTERIOR LATERAL, biopsy:  - Benign prostatic tissue, negative for malignancy       E. Prostate, LEFT BASE, biopsy:  - Prostatic adenocarcinoma, involving 1 of 2 cores:              - Rodrigo score 4 + 3 = 7, Pattern 4 = 60%, Prognostic Grade Group 3 continuously involving 20% of core (see note)              - Negative for periprostatic fat invasion, lymph-vascular invasion and perineural invasion       F. Prostate, RIGHT ANTERIOR MEDIAL, biopsy:  - Benign prostatic tissue, negative for malignancy       G. Prostate, RIGHT ANTERIOR LATERAL, biopsy:  - Benign prostatic tissue, negative for malignancy       H. Prostate, RIGHT POSTERIOR LATERAL, biopsy:  - High-grade prostatic intraepithelial neoplasia (HG-PIN)       I. Prostate, RIGHT POSTERIOR MEDIAL, biopsy:  - Prostatic adenocarcinoma, involving 2 of 2 cores:              - [place] score 3 + 4 = 7, Pattern 4 = 15%, Prognostic Grade Group 2 discontinuously involving 40% and 15% of each core respectively (see note)              - Perineural invasion in 1 of 2 involved cores              - Negative for periprostatic fat invasion and lymph-vascular invasion        J. Prostate, RIGHT BASE, biopsy:  - Benign prostatic tissue, negative for malignancy            4/30/2024 Initial Diagnosis    Prostate cancer  (HCC)         Review of Systems:  Review of Systems   Constitutional: Negative.    HENT: Negative.     Eyes: Negative.    Respiratory: Negative.     Cardiovascular: Negative.    Gastrointestinal: Negative.    Endocrine: Negative.    Genitourinary:  Positive for difficulty urinating and frequency.   Musculoskeletal: Negative.    Skin: Negative.    Allergic/Immunologic: Negative.    Neurological: Negative.    Hematological: Negative.    Psychiatric/Behavioral: Negative.         Clinical Trial: no    IPSS Questionnaire (AUA-7):  Over the past month…    1)  How often have you had a sensation of not emptying your bladder completely after you finish urinating?  1 - Less than 1 time in 5   2)  How often have you had to urinate again less than two hours after you finished urinating? 2 - Less than half the time   3)  How often have you found you stopped and started again several times when you urinated?  1 - Less than 1 time in 5   4) How difficult have you found it to postpone urination?  1 - Less than 1 time in 5   5) How often have you had a weak urinary stream?  1 - Less than 1 time in 5   6) How often have you had to push or strain to begin urination?  1 - Less than 1 time in 5   7) How many times did you most typically get up to urinate from the time you went to bed until the time you got up in the morning?  1 - 1 time   Total Score:  8       Pain assessment: 0    PSA12/12/23   PSA total - 5.94  4K score - 18.7    Prior Radiation NO    Teaching NCI radiation packet    MST completed    Implantable Devices (Port, pacemaker, pain stimulator) NO    Hip Replacement NO    Health Maintenance   Topic Date Due    Hepatitis C Screening  Never done    COVID-19 Vaccine (7 - 2023-24 season) 12/19/2023    Fall Risk  05/11/2024    Medicare Annual Wellness Visit (AWV)  05/11/2024    BMI: Followup Plan  10/16/2024    Colorectal Cancer Screening  12/09/2024    BMI: Adult  02/23/2025    Depression Screening  05/07/2025    Zoster Vaccine   Completed    Pneumococcal Vaccine: 65+ Years  Completed    Influenza Vaccine  Completed    HIB Vaccine  Aged Out    IPV Vaccine  Aged Out    Hepatitis A Vaccine  Aged Out    Meningococcal ACWY Vaccine  Aged Out    HPV Vaccine  Aged Out       Past Medical History:   Diagnosis Date    Anxiety     Dupuytren contracture     bilateral    Hyperlipidemia        Past Surgical History:   Procedure Laterality Date    COLONOSCOPY W/ POLYPECTOMY  09/23/2004    EYE SURGERY Bilateral November 2020    Cataract and laser on both eyes    HAND SURGERY Bilateral 12/2008    DUPUYTRENS CONTRACTURE SURGICAL RELEASE    CO BX PROSTATE STRTCTC SATURATION SAMPLING IMG GID N/A 2/23/2024    Procedure: TRANSPERINEAL ULTRASOUND GUIDED BIOPSY PROSTATE;  Surgeon: Melo Chris MD;  Location: [facility];  Service: Urology       Family History   Problem Relation Age of Onset    Coronary artery disease Mother     Diabetes Mother     Cirrhosis Father     Diabetes type II Family         multiple relatives    Heart disease Family         ASCVD       Social History     Tobacco Use    Smoking status: Some Days     Types: Cigars     Start date: 1980    Smokeless tobacco: Never    Tobacco comments:     Cigar once and a while when I play golf; on average I play two or three times a week... last cigar was 2.5 weeks ago   Vaping Use    Vaping status: Never Used   Substance Use Topics    Alcohol use: Yes     Alcohol/week: 7.0 standard drinks of alcohol     Types: 7 Standard drinks or equivalent per week     Comment: With dinner- drinks daily . last drink was 2/21    Drug use: No          Current Outpatient Medications:     escitalopram (LEXAPRO) 5 mg tablet, TAKE 1 TABLET (5 MG TOTAL) BY MOUTH DAILY. (Patient taking differently: Take 700 mg by mouth daily), Disp: 90 tablet, Rfl: 2    lovastatin (MEVACOR) 40 MG tablet, TAKE 1 TABLET BY MOUTH EVERY DAY, Disp: 90 tablet, Rfl: 1    No Known Allergies     Vitals:    05/07/24 1113   BP: 157/91   BP Location:  "Left arm   Patient Position: Sitting   Cuff Size: Large   Pulse: 97   Temp: 97.8 °F (36.6 °C)   TempSrc: Temporal   SpO2: 97%   Weight: 92.1 kg (203 lb)   Height: 5' 10\" (1.778 m)       Pain Score: 0-No pain  "

## 2024-05-07 NOTE — LETTER
May 8, 2024     Judson Deluca MD  [address]  [address]  [place] [address]    Patient: Ezio Muir   YOB: 1950   Date of Visit: 2024       Dear Dr. Deluca:    Thank you for referring Ezio Muir to me for evaluation. Below are my notes for this consultation.    If you have questions, please do not hesitate to call me. I look forward to following your patient along with you.         Sincerely,        Amari Leblanc MD        CC: No Recipients    Amari Leblanc MD  2024  9:36 AM  Sign when Signing Visit  Consultation - Radiation Oncology      Patient Name: Ezio Muir MRN:[id number] : 1950  Encounter: [id number]  Referring Provider: Judson Deluca MD     Cancer Staging   Prostate cancer (HCC)  Staging form: Prostate, AJCC 8th Edition  - Clinical: Stage IIC (cT1c, cN0, cM0, PSA: 5.9, Grade Group: 3) - Signed by Amari Leblacn MD on 2024  Prostate specific antigen (PSA) range: Less than 10  Rodrigo score: 7  Histologic grading system: 5 grade system    ASSESSMENT & PLAN  Ezio Muir is a 73 y.o. 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 MRI on 23 showed a 50cc gland and was PIRADS2 without a dominant lesion. PSMA PET/CT on 3/21/24 showed hypermetabolic in the left aspect of the prostate gland consistent with primary neoplasm, without evidence of regional or distant disease. MSKCC nomogram-estimated risk of LN involvement 7% and SVI 5%.     I discussed that the patient has unfavorable intermediate risk disease, and reasonable treatment options include radiation therapy with short term ADT as well as prostatectomy. If electing RT, I discussed NRG-, which is a parallel deintensification and intensification clinical trial of systemic therapy for unfavorable intermediate risk prostate cancer.       We discussed external beam radiation therapy with markers and spaceOAR placement,  and he is aware of the logistics and possible short- and long-term side effects which include, but are not limited to, fatigue, dermatitis, dysuria, urinary frequency/urgency, rectal urgency, diarrhea, cramping, sexual dysfunction, cystitis, urethral stricture, proctitis, bowel obstruction, fistula, incontinence, urinary retention, and secondary malignancy.    We reviewed pros/cons of surgery vs. RT and Mr. Muir demonstrated a thorough understanding of these approaches. At this time, Mr. Muir continues to consider all options and has agreed to follow-up with his final treatment decision.  He was given our office contact information and instructions to call with his decision.    Thank you for the opportunity to participate in the care of this patient.    Amari Leblanc MD  Department of Radiation Oncology  [facility]    No orders of the defined types were placed in this encounter.    1. Prostate cancer (HCC)  Ambulatory Referral to Radiation Oncology          Total Time Spent  55 minutes spent reviewing EMR in preparation for visit, with the patient, coordination with other providers, and documentation.    CHIEF COMPLAINT  Chief Complaint   Patient presents with   • Consult       History of Present Illness  Ezio Muir 1950 is a 73 y.o. male recently diagnosed with unfavorable intermediate risk prostate cancer. PSA was 5.9. Multiparametric MRI revealed a 50 g PI-RADS 2 gland. Pathology revealed prostate cancer with a maximum Waverly score of 4+3 equal 7 (grade group 3). Prostate cancer was noted in 4 of 10 sampled areas. He presents today for initial consult, referred by Dr. Deluca.      Lab Results   Component Value Date    PSA 4.76 (H) 10/19/2023    PSA 5.9 (H) 08/09/2023    PSA 4.2 (H) 05/11/2023    PSA 2.7 08/12/2020 9/28/23 MRI prostate multiparametric wo w contrast  IMPRESSION:  1. PI-RADSv2.1 Category 2 - Low (clinically significant cancer is unlikely to be  present).  2. Mild BPH with calculated prostate volume of 50 cc     23   PSA total - 5.94  4K score - 18.7     24 Transperineal ultrasound guided biopsy prostate  (Results below)     3/4/24 Urology - Dr. Deluca [facility].   PSA was 5.9.  Multiparametric MRI revealed a 50 g PI-RADS 2 gland.  Pathology revealed prostate cancer with a maximum Kamuela score of 4+3 equal 7 (grade group 3). Prostate cancer was noted in 4 of 10 sampled areas.  unfavorable intermediate risk prostate cancer  recommend that prior any further discussion the patient obtain a PSMA PET/CT  once this is performed he can return to the office and we will then discuss further therapy      3/21/24 NM PET CT - outside   CONCLUSION:  1.Hypermetabolism within left aspect of prostate gland from mid gland to apex, consistent with primary prostate neoplasm.  2. No metabolic evidence of regional or distant metastatic disease.   3. Subcentimeter pulmonary nodules within the right lung, as noted above . If clinically warranted, consider a follow-up study in approximately 3 months to assess stability.  4. Prominent coronary artery calcification, as noted above. If clinically warranted, consider further evaluation with a coronary CTA and/or cardiology consultation.      Upcomin/15/24 Urology - Dr. Beasley    Today, the patient notes some urinary symptoms. He has difficulty initiating and some frequency. His IPSS is an 8.     Oncology History   Prostate cancer (HCC)   2024 Biopsy    Final Diagnosis   A. Prostate, LEFT ANTERIOR MEDIAL, biopsy:  - Benign prostatic tissue, negative for malignancy      B. Prostate, LEFT ANTERIOR LATERAL, biopsy:  - Prostatic adenocarcinoma, very small focus of poorly formed glands in 1 of 3 cores (cannot be graded) (see note)  - Focal high-grade prostatic intraepithelial neoplasia (HG-PIN)      C. Prostate, LEFT POSTERIOR MEDIAL, biopsy:  - Prostatic adenocarcinoma, involving 2 of 2 cores:              -  Rodrigo score 3 + 4 = 7, Pattern 4 = 25%, Prognostic Grade Group 2 continuously involving 5% of each core respectively (see note)              - Negative for periprostatic fat invasion, lymph-vascular invasion and perineural invasion       D. Prostate, LEFT POSTERIOR LATERAL, biopsy:  - Benign prostatic tissue, negative for malignancy       E. Prostate, LEFT BASE, biopsy:  - Prostatic adenocarcinoma, involving 1 of 2 cores:              - Rodrigo score 4 + 3 = 7, Pattern 4 = 60%, Prognostic Grade Group 3 continuously involving 20% of core (see note)              - Negative for periprostatic fat invasion, lymph-vascular invasion and perineural invasion       F. Prostate, RIGHT ANTERIOR MEDIAL, biopsy:  - Benign prostatic tissue, negative for malignancy       G. Prostate, RIGHT ANTERIOR LATERAL, biopsy:  - Benign prostatic tissue, negative for malignancy       H. Prostate, RIGHT POSTERIOR LATERAL, biopsy:  - High-grade prostatic intraepithelial neoplasia (HG-PIN)       I. Prostate, RIGHT POSTERIOR MEDIAL, biopsy:  - Prostatic adenocarcinoma, involving 2 of 2 cores:              - Saint Regis score 3 + 4 = 7, Pattern 4 = 15%, Prognostic Grade Group 2 discontinuously involving 40% and 15% of each core respectively (see note)              - Perineural invasion in 1 of 2 involved cores              - Negative for periprostatic fat invasion and lymph-vascular invasion        J. Prostate, RIGHT BASE, biopsy:  - Benign prostatic tissue, negative for malignancy          4/30/2024 Initial Diagnosis    Prostate cancer (HCC)     5/8/2024 -  Cancer Staged    Staging form: Prostate, AJCC 8th Edition  - Clinical: Stage IIC (cT1c, cN0, cM0, PSA: 5.9, Grade Group: 3) - Signed by Amari Leblanc MD on 5/8/2024  Prostate specific antigen (PSA) range: Less than 10  Saint Regis score: 7  Histologic grading system: 5 grade system         Historical Information  Past Medical History:   Diagnosis Date   • Anxiety    • Dupuytren contracture      "bilateral   • Hyperlipidemia      Past Surgical History:   Procedure Laterality Date   • COLONOSCOPY W/ POLYPECTOMY  09/23/2004   • EYE SURGERY Bilateral November 2020    Cataract and laser on both eyes   • HAND SURGERY Bilateral 12/2008    DUPUYTRENS CONTRACTURE SURGICAL RELEASE   • FL BX PROSTATE STRTCTC SATURATION SAMPLING IMG GID N/A 2/23/2024    Procedure: TRANSPERINEAL ULTRASOUND GUIDED BIOPSY PROSTATE;  Surgeon: Melo Chris MD;  Location: [facility];  Service: Urology     Family History   Problem Relation Age of Onset   • Coronary artery disease Mother    • Diabetes Mother    • Cirrhosis Father    • Diabetes type II Family         multiple relatives   • Heart disease Family         ASCVD     Social History  Social History     Substance and Sexual Activity   Alcohol Use Yes   • Alcohol/week: 7.0 standard drinks of alcohol   • Types: 7 Standard drinks or equivalent per week    Comment: With dinner- drinks daily . last drink was 2/21     Social History     Substance and Sexual Activity   Drug Use No     Social History     Tobacco Use   Smoking Status Some Days   • Types: Cigars   • Start date: 1980   Smokeless Tobacco Never   Tobacco Comments    Cigar once and a while when I play golf; on average I play two or three times a week... last cigar was 2.5 weeks ago     Meds/Allergies    Current Outpatient Medications:   •  escitalopram (LEXAPRO) 5 mg tablet, TAKE 1 TABLET (5 MG TOTAL) BY MOUTH DAILY. (Patient taking differently: Take 700 mg by mouth daily), Disp: 90 tablet, Rfl: 2  •  lovastatin (MEVACOR) 40 MG tablet, TAKE 1 TABLET BY MOUTH EVERY DAY, Disp: 90 tablet, Rfl: 1  No Known Allergies   Pathology:  See above.    Review of Systems  Refer to nursing note    OBJECTIVE:   /91 (BP Location: Left arm, Patient Position: Sitting, Cuff Size: Large)   Pulse 97   Temp 97.8 °F (36.6 °C) (Temporal)   Ht 5' 10\" (1.778 m)   Wt 92.1 kg (203 lb)   SpO2 97%   BMI 29.13 kg/m²   Pain Assessment:  " "0  Performance Status: ECO - Asymptomatic    Physical Exam  General Appearance:  Alert, cooperative, no distress, appears stated age  Cardiovascular:  Extremities warm and well perfused  Lungs: Respirations unlabored, no cyanosis, able to speak in complete sentences without dyspnea.  JACQUELYN: Deferred   Skin: No generalized rash or dermatitis  Neurologic: ANOx3, speech and cognition intact.    Portions of the record may have been created with voice recognition software.  Occasional wrong word or \"sound a like\" substitutions may have occurred due to the inherent limitations of voice recognition software.  Read the chart carefully and recognize, using context, where substitutions have occurred.     "

## 2024-05-09 NOTE — PROGRESS NOTES
Referring Physician: Paresh Pillai MD  A copy of this note was sent to the referring physician.       Diagnoses and all orders for this visit:    Prostate cancer (HCC)  -     Case request operating room: PROSTATECTOMY RADICAL LAPAROSCOPIC  W ROBOTICS; Standing  -     Case request operating room: PROSTATECTOMY RADICAL LAPAROSCOPIC  W ROBOTICS    Other orders  -     Place sequential compression device; Standing  -     Nursing communication Patient instructions: Please drink 1 bottle 1 hour after dinner and 1 bottle 1 hour before bed on the night before your surgery. Please drink 1 hour before arriving to hospital for surgery; Standing  -     bupivacaine liposomal (EXPAREL) 1.3 % injection 20 mL            Assessment and plan:       1.  Grade group 3 prostate cancer  -Date of diagnosis February 2024 (transperineal biopsy)  -Prebiopsy MRI September 2023 no radiographically detectable disease  - Pathology reveals Rodrigo 4+3 equal 7 disease in 1 core and 4 additional positive cores of 3+4 equal 7  - Pretreatment PSA is 4.76  -PMS a PET CT demonstrates no radiographically metastatic disease as expected    2.  Medical comorbidities:  -Relatively few  - Hyperlipidemia on statin  - Prior history of COVID-19  - Generalized anxiety disorder on Lexapro, managing well    It was a pleasure to evaluate the patient.  We discussed the surgical option in great detail      We discussed the natural history of prostate cancer, the Waverly grading system, and the patient's current staging. He was provided with a copy of his biopsy report. We discussed the options for managing newly diagnosed prostate cancer including active surveillance for low risk disease, radical prostatectomy, and pelvic radiation therapy. We discussed each of these management strategies in detail, with particular emphasis to how they do or do not apply to the patient's current staging. A discussion was guided using criteria for National Comprehensive Cancer  Network (NCCN) to stratify patient's according to low, intermediate, or high-risk prostate cancer. We discussed the goals of care as #1 being cancer cure, and #2 being preservation of urinary control and erectile function.    With regards to surgical resection, we discussed the benefits and risks, including but not limited to bleeding which may or may not require blood transfusion, infection, injury to other structures (bladder, ureters, rectum, nerves, & vascular /neural structures), ileus, DVT/PE, MI, CVA, urinary incontinence, impotence, failure to completely eradicate the tumor/positive margins or the need for further therapy, and death. Risks of severe urinary incontinence necessitating an AUS 1-3% and minor stress urinary incontinence requiring pads about 10-15% at 1 year were also discussed. The risk of impotence was also discussed in great detail. After surgery, his libido should be unchanged, he may or may not have normal spontaneous erections but that he will have a dry orgasm. Post operative problems including the need for adjuvant therapy were discussed. He had the opportunity to ask questions and his questions were answered to his satisfaction.    Ezio would like to go ahead and schedule a robotic prostatectomy.  I do think he is a good candidate.  We discussed that a longer recovery time and high rate of incontinence may be an issue due to his age and he understands this.  His primary motivation in pursuing surgical solution to his intermediate risk prostate cancer is to minimize the duration of his treatment so that he can continue to help care for his wife who is undergoing chemotherapy for advanced gynecologic cancer.    Robotic prostatectomy will be scheduled at the next available date per his request.  Informed consent was obtained today.    I have spent 60 minutes with Patient  today in which greater than 50% of this time was spent in counseling/coordination of care regarding Diagnostic  results, Prognosis, Risks and benefits of tx options, Impressions, Counseling / Coordination of care, Reviewing / ordering tests, medicine, procedures  , and Obtaining or reviewing history  .    Please note this time includes cumulative time on the day of encounter, including reviewing medical records and/or coordinating care among the patient's other specialists.     Dru Beasley MD      Chief Complaint     Prostatectomy discussion      History of Present Illness     Ezio Muir is a 73 y.o. referred in consultation by my colleague Dr. Deluca.  History as above.  He has been evaluated by radiation oncology and presents today to discuss the surgical option    Detailed Urologic History     - please refer to HPI    Review of Systems     Review of Systems   Constitutional: Negative for activity change and fatigue.   HENT: Negative for congestion.    Eyes: Negative for visual disturbance.   Respiratory: Negative for shortness of breath and wheezing.    Cardiovascular: Negative for chest pain and leg swelling.   Gastrointestinal: Negative for abdominal pain.   Endocrine: Negative for polyuria.   Genitourinary: Negative for dysuria, flank pain, hematuria and urgency.   Musculoskeletal: Negative for back pain.   Allergic/Immunologic: Negative for immunocompromised state.   Neurological: Negative for dizziness and numbness.   Psychiatric/Behavioral: Negative for dysphoric mood.   All other systems reviewed and are negative.    AUA SYMPTOM SCORE      Flowsheet Row Most Recent Value   AUA SYMPTOM SCORE    How often have you had a sensation of not emptying your bladder completely after you finished urinating? 2 (P)    How often have you had to urinate again less than two hours after you finished urinating? 2 (P)    How often have you found you stopped and started again several times when you urinate? 1 (P)    How often have you found it difficult to postpone urination? 1 (P)    How often have you had a weak urinary  stream? 1 (P)    How often have you had to push or strain to begin urination? 1 (P)    How many times did you most typically get up to urinate from the time you went to bed at night until the time you got up in the morning? 1 (P)    Quality of Life: If you were to spend the rest of your life with your urinary condition just the way it is now, how would you feel about that? 2 (P)    AUA SYMPTOM SCORE 9 (P)               Allergies     No Known Allergies    Physical Exam       Physical Exam  Constitutional:       General: He is not in acute distress.     Appearance: He is well-developed.   HENT:      Head: Normocephalic and atraumatic.   Cardiovascular:      Comments: Negative lower extremity edema  Pulmonary:      Effort: Pulmonary effort is normal.      Breath sounds: Normal breath sounds.   Abdominal:      Palpations: Abdomen is soft.   Musculoskeletal:         General: Normal range of motion.      Cervical back: Normal range of motion.   Skin:     General: Skin is warm.   Neurological:      Mental Status: He is alert and oriented to person, place, and time.   Psychiatric:         Behavior: Behavior normal.           Vital Signs  There were no vitals filed for this visit.      Current Medications       Current Outpatient Medications:     escitalopram (LEXAPRO) 5 mg tablet, TAKE 1 TABLET (5 MG TOTAL) BY MOUTH DAILY. (Patient taking differently: Take 700 mg by mouth daily), Disp: 90 tablet, Rfl: 2    lovastatin (MEVACOR) 40 MG tablet, TAKE 1 TABLET BY MOUTH EVERY DAY, Disp: 90 tablet, Rfl: 1      Active Problems     Patient Active Problem List   Diagnosis    HLD (hyperlipidemia)    Allergic rhinitis    Hyperglycemia    Medicare annual wellness visit, subsequent    COVID-19    Benign prostatic hyperplasia with lower urinary tract symptoms    Elevated PSA    ARTEMIO (generalized anxiety disorder)    Prostate cancer (HCC)         Past Medical History     Past Medical History:   Diagnosis Date    Anxiety     Dupuytren  "contracture     bilateral    Hyperlipidemia          Surgical History     Past Surgical History:   Procedure Laterality Date    COLONOSCOPY W/ POLYPECTOMY  09/23/2004    EYE SURGERY Bilateral November 2020    Cataract and laser on both eyes    HAND SURGERY Bilateral 12/2008    DUPUYTRENS CONTRACTURE SURGICAL RELEASE    OR BX PROSTATE STRTCTC SATURATION SAMPLING IMG GID N/A 2/23/2024    Procedure: TRANSPERINEAL ULTRASOUND GUIDED BIOPSY PROSTATE;  Surgeon: Melo Chris MD;  Location: [facility];  Service: Urology         Family History     Family History   Problem Relation Age of Onset    Coronary artery disease Mother     Diabetes Mother     Cirrhosis Father     Diabetes type II Family         multiple relatives    Heart disease Family         ASCVD         Social History     Social History     Social History     Tobacco Use   Smoking Status Some Days    Types: Cigars    Start date: 1980   Smokeless Tobacco Never   Tobacco Comments    Cigar once and a while when I play golf; on average I play two or three times a week... last cigar was 2.5 weeks ago         Pertinent Lab Values     Lab Results   Component Value Date    CREATININE 0.88 02/09/2024       Lab Results   Component Value Date    PSA 4.76 (H) 10/19/2023    PSA 5.9 (H) 08/09/2023    PSA 4.2 (H) 05/11/2023       @RESULTRCNT(1H])@      Pertinent Imaging       No results found.      Portions of the record may have been created with voice recognition software.  Occasional wrong word or \"sound a like\" substitutions may have occurred due to the inherent limitations of voice recognition software.  In addition some of the content generated from this outpatient encounter includes information designed for patient education and/or communication back to the referring provider.  Read the chart carefully and recognize, using context, where substitutions have occurred.    "

## 2024-05-14 ENCOUNTER — ESTABLISHED COMPREHENSIVE EXAM (OUTPATIENT)
Dept: URBAN - METROPOLITAN AREA CLINIC 6 | Facility: CLINIC | Age: 74
End: 2024-05-14

## 2024-05-14 DIAGNOSIS — H52.13: ICD-10-CM

## 2024-05-14 DIAGNOSIS — Z96.1: ICD-10-CM

## 2024-05-14 DIAGNOSIS — H26.492: ICD-10-CM

## 2024-05-14 PROCEDURE — 92014 COMPRE OPH EXAM EST PT 1/>: CPT

## 2024-05-14 ASSESSMENT — VISUAL ACUITY
OD_SC: 20/20
OS_SC: 20/20

## 2024-05-14 ASSESSMENT — TONOMETRY
OD_IOP_MMHG: 18
OS_IOP_MMHG: 18

## 2024-05-15 ENCOUNTER — OFFICE VISIT (OUTPATIENT)
Dept: UROLOGY | Facility: CLINIC | Age: 74
End: 2024-05-15
Payer: MEDICARE

## 2024-05-15 VITALS
HEART RATE: 70 BPM | OXYGEN SATURATION: 100 % | DIASTOLIC BLOOD PRESSURE: 82 MMHG | HEIGHT: 70 IN | SYSTOLIC BLOOD PRESSURE: 132 MMHG | WEIGHT: 204.4 LBS | BODY MASS INDEX: 29.26 KG/M2

## 2024-05-15 DIAGNOSIS — C61 PROSTATE CANCER (HCC): Primary | ICD-10-CM

## 2024-05-15 PROCEDURE — 99215 OFFICE O/P EST HI 40 MIN: CPT | Performed by: UROLOGY

## 2024-05-15 NOTE — PATIENT INSTRUCTIONS
Ezio Muir -    You have elected for a Robotic Prostatectomy to treat your prostate cancer.      In the next few days, you will receive a phone call from my surgical scheduler.  She will discuss selecting a date for surgery, and provide you with our preoperative surgical packet.  This includes information to help prepare you for surgery.    I encourage you to begin preparing for the procedure by watching our practice's preoperative educational videos here:     [email or web address]          Dru Beasley MD,PhD  [facility]  Chief of Surgery, [facility]  [phone]

## 2024-05-16 ENCOUNTER — DOCUMENTATION (OUTPATIENT)
Dept: HEMATOLOGY ONCOLOGY | Facility: CLINIC | Age: 74
End: 2024-05-16

## 2024-05-16 ENCOUNTER — TELEPHONE (OUTPATIENT)
Dept: RADIATION ONCOLOGY | Facility: HOSPITAL | Age: 74
End: 2024-05-16

## 2024-05-16 NOTE — TELEPHONE ENCOUNTER
Gabe Leblanc,   Mr. Isadora just called asking to speak with you, he has some questions since meeting with the robotic surgeon yesterday prior to making his final treatment decision. He is now leaning more towards radiation.   Some of his questions are regarding which treatment plan (# of fx) you would recommend and timing and would the outcome be the same for all 3 options. If the cancer were to come back after he has radiation, what would his options be.   He said to call his cell phone, in chart.     Kari Goss

## 2024-05-16 NOTE — TELEPHONE ENCOUNTER
Spoke to Mr. Muir. He has a very good understanding of his treatment options and prior discussions with myself and urology. He has spoken to a few contacts regarding their experiences with surgery or radiation. We reviewed his radiation-specific questions which were answered to satisfaction. He will continue to consider his decision and reach out to any of our offices with his final decision. If electing radiation therapy, he is leaning towards moderate hypofractionation (28 sessions).    Amari Leblanc MD  Department of Radiation Oncology  [facility]

## 2024-05-16 NOTE — PROGRESS NOTES
Chart reviewed. Patient was undecided with treatment as of 5/14/24. Per Dr Castillo office note from 5/15/24, patient will be proceeding with robotic prostatectomy. Patient will continue to follow up with urology office. No oncology care coordination needed moving forward.

## 2024-05-16 NOTE — PROGRESS NOTES
"Message received from Dr Leblanc stating:    \"Spoke to Mr. Muir. He has a very good understanding of his treatment options and prior discussions with myself and urology. He has spoken to a few contacts regarding their experiences with surgery or radiation. We reviewed his radiation-specific questions which were answered to satisfaction. He will continue to consider his decision and reach out to any of our offices with his final decision. If electing radiation therapy, he is leaning towards moderate hypofractionation (28 sessions).     Amari Leblanc MD  Department of Radiation Oncology  [facility]\"    Patient will be calling office with his decision. I will be available and proceed as needed upon treatment decision.  "

## 2024-05-23 ENCOUNTER — PATIENT OUTREACH (OUTPATIENT)
Dept: HEMATOLOGY ONCOLOGY | Facility: CLINIC | Age: 74
End: 2024-05-23

## 2024-05-23 ENCOUNTER — TELEPHONE (OUTPATIENT)
Dept: HEMATOLOGY ONCOLOGY | Facility: CLINIC | Age: 74
End: 2024-05-23

## 2024-05-23 ENCOUNTER — DOCUMENTATION (OUTPATIENT)
Dept: HEMATOLOGY ONCOLOGY | Facility: CLINIC | Age: 74
End: 2024-05-23

## 2024-05-23 DIAGNOSIS — C61 PROSTATE CANCER (HCC): Primary | ICD-10-CM

## 2024-05-23 NOTE — TELEPHONE ENCOUNTER
Lisha Rausch RN  You2 days ago     Ezio Membreno gave me a call this morning and I let him know that you (or someone) would be reaching out shortly to schedule ADT/marker/spaceoar placement. Thank you!!     Lisha Parrish

## 2024-05-23 NOTE — PROGRESS NOTES
I reached out and spoke with Yousif now that consults have been completed with the oncology teams to review for any barriers to care and offer supportive services as needed. I reviewed and updated the members assigned to the care team in [facility].   He knows the members of the care team as well as how and when to contact them with any needs.   He verbalizes managing the schedules well.   They are currently driving themselves and deny any transportation needs.    He denies any uncontrolled symptoms. Discussed role of Palliative Care in symptom and side effect management. Declined referral at this time.  Patients states that they are eating and drinking as per usual with no unintentional weight loss.     Patient is currently smoking. We reviewed the smoking cessation program. I offered to place a referral but patient declines at this time.    Patient states he is well supported by family and friends.  Community support groups discussed including the Cancer Support Community of the [facility]. Patient declined information at this time.   Patient feels they have adequate insurance coverage and deny any financial concerns at this time.     Discussed the plan for follow-up with the patient. Based on their individual needs I will follow up with them in a few weeks about alf through treatment.   I have provided my direct contact information to the patient and welcome them to contact me if their needs as discussed above change. They were appreciative for the call.

## 2024-05-23 NOTE — PROGRESS NOTES
Message sent to urology office to schedule ADT for patient per RAD ONC. I will follow up on date in the future.

## 2024-05-24 ENCOUNTER — TELEPHONE (OUTPATIENT)
Dept: UROLOGY | Facility: AMBULATORY SURGERY CENTER | Age: 74
End: 2024-05-24

## 2024-05-24 ENCOUNTER — PATIENT OUTREACH (OUTPATIENT)
Dept: CASE MANAGEMENT | Facility: HOSPITAL | Age: 74
End: 2024-05-24

## 2024-05-24 NOTE — TELEPHONE ENCOUNTER
Zion Healy PA-C  You1 hour ago (9:39 AM)     1 Firmagon and 1 Lupron please.       Please call patient and schedule him for the following   - loading dose of firmagon within 1-2 weeks to allow for insurance auth   -schedule lupron 4 weeks from firmagon injection

## 2024-05-24 NOTE — TELEPHONE ENCOUNTER
I called pt to discuss scheduling his RALP with Dr. Beasley. I was able to speak with pt and he stated that he has decided to have radiation instead of surgery. I did apologize for the miscommunication and informed pt that I will cancel his surgical orders and make sure Dr. Beasley is aware as well.

## 2024-05-28 ENCOUNTER — PATIENT OUTREACH (OUTPATIENT)
Dept: CASE MANAGEMENT | Facility: HOSPITAL | Age: 74
End: 2024-05-28

## 2024-05-28 ENCOUNTER — DOCUMENTATION (OUTPATIENT)
Dept: HEMATOLOGY ONCOLOGY | Facility: CLINIC | Age: 74
End: 2024-05-28

## 2024-05-28 ENCOUNTER — TELEPHONE (OUTPATIENT)
Dept: OTHER | Facility: HOSPITAL | Age: 74
End: 2024-05-28

## 2024-05-28 NOTE — TELEPHONE ENCOUNTER
Clinical Research Coordinator spoke with the pt regarding interest in the [facility]  clinical trial. Pt was identified and first introduced to the study by Dr. Leblanc during his consult on 5/07/2024. Pt was at first leaning towards surgical treatment instead of RT + ADT, but recently decided to pursue the latter. The pt's questions were answered regarding the Decipher testing and impact on current treatment timeline; pt expressed that his wife was currently undergoing chemotherapy herself and that he would like to get started with his treatment as soon as possible. Pt declined enrollment for the study at this time and will keep his scheduled visit with Urology on 6/06/2024 but was thankful for the call.

## 2024-05-28 NOTE — PROGRESS NOTES
Biopsychosocial and Barriers Assessment    Cancer Diagnosis: Prostate Cancer  Home/Cell Phone: [phone]  Emergency Contact: Emily Muir (wife)  Marital Status:   Interpretation concerns, speaks another language, preferred language: English  Cultural concerns: none  Ability to read or write: yes    Caregiver/Support: wife and family  Children:2 adult children   Child/Elder care: none     Housing: House  Home Setup:  multistory home  Lives With: wife  Daily Living Activities: independent  Durable Medical Equipment: none  Ambulation: independent    Preferred Pharmacy: [facility] [address] [place]  High co-pays with insurance: Medicare primary, BC secondary   High co-pays with medication coverage: none at this time  No medication coverage: N/A, has Part D coverage    Primary Care Provider: Paresh Pillai MD  Hx of Home Health Care: none  Hx of Short term rehab: none  Mental Health Hx: H/O Anxiety  Substance Abuse Hx: + tobacco  Employment: retired for age   Status/Location: none  Ability to pay bills: yes  POA/LW/AD: not addressed  Transportation Plan/Concerns: no needs, will drive self to radiation      What do you know about your Cancer Diagnosis    What has your doctor told you about your cancer diagnosis:Prostate cancer    What has your doctor told you about your cancer treatment: I could have surgery or radiation and I have decided to go with radiation    What specific concerns do you have about your diagnosis and treatment: none at this time    Have you been made aware of any hair loss associated with treatment: N/A    Additional Comments:    OSW placed initial outreach call to Mr. Muir today. Introduced self and explained role of the OSW team. Mr. Muir has prostate cancer. He was given the choice of either surgery or radiation, and has now decided upon radiation and Lupron injections. He stated he will have 2 injections with urology, then will have his SpaceOAR and markers placed to prepare for  radiation. He will have treatments at [facility], and denied any transportation concerns.   Mr. Muir stated he is feeling well emotionally and denied any psychosocial or financial issues. OSW provided direct contact information should he have any needs in the future. He was appreciative of call.

## 2024-06-06 ENCOUNTER — PROCEDURE VISIT (OUTPATIENT)
Dept: UROLOGY | Facility: CLINIC | Age: 74
End: 2024-06-06
Payer: MEDICARE

## 2024-06-06 VITALS
SYSTOLIC BLOOD PRESSURE: 160 MMHG | BODY MASS INDEX: 29.27 KG/M2 | OXYGEN SATURATION: 97 % | HEART RATE: 78 BPM | DIASTOLIC BLOOD PRESSURE: 82 MMHG | WEIGHT: 204 LBS

## 2024-06-06 DIAGNOSIS — C61 PROSTATE CANCER (HCC): Primary | ICD-10-CM

## 2024-06-06 PROCEDURE — 96402 CHEMO HORMON ANTINEOPL SQ/IM: CPT

## 2024-06-06 NOTE — PROGRESS NOTES
6/6/2024  Ezio Muir is a 73 y.o. male  [id number]    Diagnosis:  Chief Complaint    Prostate Cancer         Patient presents for lupron administration managed by Dr. Beasley    Plan:  Patient will follow up as scheduled       Medication administration:    Patient provided with informational paperwork as well as verbally reviewed what ADT is and potential side effects. All questions were answered     Medication prepared per manufacturers instructions. Site cleaned with alcohol swab on L upper outer quadrant of buttock. Medication administered per protocol. Bandaid applied.     Administrations This Visit       leuprolide (LUPRON DEPOT 6 MONTH KIT) IM injection kit 45 mg       Admin Date  06/06/2024 Action  Given Dose  45 mg Route  Intramuscular Documented By  Audelia Barros RN                    Vitals:    06/06/24 1021   BP: 160/82   Pulse: 78   SpO2: 97%   Weight: 92.5 kg (204 lb)         Audelia Barros RN

## 2024-06-07 ENCOUNTER — DOCUMENTATION (OUTPATIENT)
Dept: HEMATOLOGY ONCOLOGY | Facility: CLINIC | Age: 74
End: 2024-06-07

## 2024-06-13 ENCOUNTER — TELEPHONE (OUTPATIENT)
Dept: UROLOGY | Facility: AMBULATORY SURGERY CENTER | Age: 74
End: 2024-06-13

## 2024-06-13 NOTE — TELEPHONE ENCOUNTER
Patient calling stating he is returning call to delmer to schedule space OAR appt. I did let him know delmer was off today and would give him a call back. Patient will be available to talk tomorrow after 3pm.

## 2024-06-14 NOTE — TELEPHONE ENCOUNTER
Pt called to speak with SS regarding scheduling Space OAR.  Informed pt that Elyse is out of the office today.  Pt requesting a call back once she returns.    Pt call back: [phone]

## 2024-06-17 ENCOUNTER — DOCUMENTATION (OUTPATIENT)
Dept: HEMATOLOGY ONCOLOGY | Facility: CLINIC | Age: 74
End: 2024-06-17

## 2024-06-17 NOTE — TELEPHONE ENCOUNTER
Spoke with patient and confirmed surgery date of: 7/25/2024  Type of surgery: Fiducial markers and SpaceOAR  Operating physician: Dr. Deluca  Location of surgery: [facility]    Verbally went over prep with patient on: 6/17/2024  NPO  Bowel prep? Yes, 1 enema 1 hour prior to leaving the house for the procedure  Hospital calls afternoon prior with arrival time -Calls Friday afternoon for Monday surgeries  Patient needs ride to and from surgery (outpatient/inpatient)   Pre-op testing to be done 2 weeks prior to surgery   CBC, CMP, Urine C&S  Blood thinners:   none  Clearances needed: none    Mailed to patient on: 6/17/2024  Copy of packet scanned into Media on: 6/17/2024  Labs in packet  Soap instructions in packet  Pre-op & Post-op in packet  H&P on admit        Consent: on admit

## 2024-06-17 NOTE — PROGRESS NOTES
Patient is scheduled for SpaceOAR/Fiducial markers on 7/25/24. Message sent to RAD ONC to schedule SIM. I will follow up on date

## 2024-06-17 NOTE — TELEPHONE ENCOUNTER
Pt calling to speak with SS regarding space OAR. Pt requesting a call back from .     Pt call back- [phone]

## 2024-06-24 DIAGNOSIS — F41.1 GAD (GENERALIZED ANXIETY DISORDER): ICD-10-CM

## 2024-06-24 RX ORDER — ESCITALOPRAM OXALATE 5 MG/1
5 TABLET ORAL DAILY
Qty: 30 TABLET | Refills: 0 | Status: SHIPPED | OUTPATIENT
Start: 2024-06-24

## 2024-06-24 NOTE — TELEPHONE ENCOUNTER
Reason for call:   [x] Refill   [] Prior Auth  [] Other:     Office:   [x] PCP/Provider - Tristen Wylie,    [] Specialty/Provider -     Medication: escitalopram     Dose/Frequency: 5 mg / 1 tab daily    Quantity: 90 tabs    Pharmacy: [facility]/pharmacy #[id number] - [place], [place] - [address]-     Does the patient have enough for 3 days?   [x] Yes   [] No - Send as HP to POD

## 2024-06-28 ENCOUNTER — DOCUMENTATION (OUTPATIENT)
Dept: HEMATOLOGY ONCOLOGY | Facility: CLINIC | Age: 74
End: 2024-06-28

## 2024-06-28 NOTE — PROGRESS NOTES
Patient is scheduled for Radiation SIM on 8/1/24. I will make sure patient follows back up with urology 3-6 months after completion of radiation with PSA prior to the visit.

## 2024-07-03 ENCOUNTER — CLINICAL SUPPORT (OUTPATIENT)
Dept: UROLOGY | Facility: CLINIC | Age: 74
End: 2024-07-03
Payer: MEDICARE

## 2024-07-03 VITALS
HEART RATE: 81 BPM | HEIGHT: 70 IN | SYSTOLIC BLOOD PRESSURE: 144 MMHG | OXYGEN SATURATION: 98 % | BODY MASS INDEX: 29.35 KG/M2 | WEIGHT: 205 LBS | DIASTOLIC BLOOD PRESSURE: 74 MMHG

## 2024-07-03 DIAGNOSIS — C61 PROSTATE CANCER (HCC): Primary | ICD-10-CM

## 2024-07-03 PROCEDURE — 99213 OFFICE O/P EST LOW 20 MIN: CPT

## 2024-07-03 NOTE — PROGRESS NOTES
7/3/2024      No chief complaint on file.        Assessment and Plan    73 y.o. male     Grade Group 3 Prostate cancer  -Date of diagnosis February 2024 (transperineal biopsy)  -Prebiopsy MRI September 2023 no radiographically detectable disease  -Pathology reveals Pope 4+3 equal 7 disease in 1 core and 4 additional positive cores of 3+4 equal 7  -Pretreatment PSA is 4.76 (10/19/23)  -Patient had 1 Lupron Injection (6/06/24)  -Scheduled for insertion of fiducial markers and spaceOAR on 7/25 with Dr. Deluca. Next rad onc follow-up is scheduled for 8/01.   -Patient will stop by the lab next week for pre-op blood work and urine culture.          History of Present Illness  Ezio Muir is a 73 y.o. male here with history of Grade group 3 prostate cancer. He presents today for follow-up after his Lupron injection (6/06/24). He has decided to pursue radiation treatment. He had 1 Lupron injection. He tolerated the injection well. Occasionally, he states he might experience a hot flash for a minute or two.       Review of Systems   Constitutional:  Negative for activity change, fatigue and fever.   HENT:  Negative for congestion, rhinorrhea and sore throat.    Eyes:  Negative for photophobia, redness and visual disturbance.   Respiratory:  Negative for cough, shortness of breath and wheezing.    Cardiovascular:  Negative for chest pain, palpitations and leg swelling.   Gastrointestinal:  Negative for abdominal pain, diarrhea, nausea and vomiting.   Genitourinary:  Negative for dysuria, flank pain, frequency, hematuria and urgency.   Neurological:  Negative for weakness, light-headedness and headaches.           AUA SYMPTOM SCORE      Flowsheet Row Most Recent Value   AUA SYMPTOM SCORE    How often have you had a sensation of not emptying your bladder completely after you finished urinating? 1 (P)     How often have you had to urinate again less than two hours after you finished urinating? 1 (P)     How often have you  "found you stopped and started again several times when you urinate? 1 (P)     How often have you found it difficult to postpone urination? 1 (P)     How often have you had a weak urinary stream? 1 (P)     How often have you had to push or strain to begin urination? 1 (P)     How many times did you most typically get up to urinate from the time you went to bed at night until the time you got up in the morning? 1 (P)     Quality of Life: If you were to spend the rest of your life with your urinary condition just the way it is now, how would you feel about that? 1 (P)     AUA SYMPTOM SCORE 7 (P)               Vitals  Vitals:    07/03/24 0953   BP: 144/74   BP Location: Left arm   Patient Position: Sitting   Cuff Size: Standard   Pulse: 81   SpO2: 98%   Weight: 93 kg (205 lb)   Height: 5' 10\" (1.778 m)       Physical Exam  Constitutional:       Appearance: Normal appearance. He is not toxic-appearing.   HENT:      Head: Normocephalic.      Mouth/Throat:      Pharynx: Oropharynx is clear.   Eyes:      Extraocular Movements: Extraocular movements intact.      Pupils: Pupils are equal, round, and reactive to light.   Pulmonary:      Effort: Pulmonary effort is normal. No respiratory distress.   Musculoskeletal:         General: Normal range of motion.      Cervical back: Normal range of motion.   Neurological:      Mental Status: He is alert and oriented to person, place, and time. Mental status is at baseline.      Gait: Gait normal.   Psychiatric:         Mood and Affect: Mood normal.         Behavior: Behavior normal.         Thought Content: Thought content normal.         Judgment: Judgment normal.           Past History  Past Medical History:   Diagnosis Date    Anxiety     Dupuytren contracture     bilateral    Hyperlipidemia      Social History     Socioeconomic History    Marital status: /Civil Union     Spouse name: None    Number of children: None    Years of education: None    Highest education level: " None   Occupational History    None   Tobacco Use    Smoking status: Some Days     Types: Cigars     Start date: 1980    Smokeless tobacco: Never    Tobacco comments:     Cigar once and a while when I play golf; on average I play two or three times a week... last cigar was 2.5 weeks ago   Vaping Use    Vaping status: Never Used   Substance and Sexual Activity    Alcohol use: Yes     Alcohol/week: 7.0 standard drinks of alcohol     Types: 7 Standard drinks or equivalent per week     Comment: With dinner- drinks daily . last drink was 2/21    Drug use: No    Sexual activity: Not Currently   Other Topics Concern    None   Social History Narrative    None     Social Determinants of Health     Financial Resource Strain: Low Risk  (9/14/2023)    Overall Financial Resource Strain (CARDIA)     Difficulty of Paying Living Expenses: Not very hard   Food Insecurity: Not on file   Transportation Needs: No Transportation Needs (9/14/2023)    PRAPARE - Transportation     Lack of Transportation (Medical): No     Lack of Transportation (Non-Medical): No   Physical Activity: Not on file   Stress: Not on file   Social Connections: Not on file   Intimate Partner Violence: Not on file   Housing Stability: Not on file     Social History     Tobacco Use   Smoking Status Some Days    Types: Cigars    Start date: 1980   Smokeless Tobacco Never   Tobacco Comments    Cigar once and a while when I play golf; on average I play two or three times a week... last cigar was 2.5 weeks ago     Family History   Problem Relation Age of Onset    Coronary artery disease Mother     Diabetes Mother     Cirrhosis Father     Diabetes type II Family         multiple relatives    Heart disease Family         ASCVD       The following portions of the patient's history were reviewed and updated as appropriate: allergies, current medications, past medical history, past social history, past surgical history and problem list.    Results  No results found for this  or any previous visit (from the past 1 hour(s)).]  Lab Results   Component Value Date    PSA 4.76 (H) 10/19/2023    PSA 5.9 (H) 08/09/2023    PSA 4.2 (H) 05/11/2023    PSA 2.7 08/12/2020     Lab Results   Component Value Date    CALCIUM 9.3 02/09/2024    K 4.4 02/09/2024    CO2 26 02/09/2024     02/09/2024    BUN 12 02/09/2024    CREATININE 0.88 02/09/2024     Lab Results   Component Value Date    WBC 5.03 02/09/2024    HGB 15.3 02/09/2024    HCT 44.7 02/09/2024    MCV 94 02/09/2024     02/09/2024       SUSHIL Isaac

## 2024-07-15 ENCOUNTER — APPOINTMENT (OUTPATIENT)
Dept: LAB | Age: 74
End: 2024-07-15
Payer: MEDICARE

## 2024-07-15 DIAGNOSIS — R39.89 SUSPECTED UTI: ICD-10-CM

## 2024-07-15 DIAGNOSIS — C61 MALIGNANT NEOPLASM OF PROSTATE (HCC): ICD-10-CM

## 2024-07-15 DIAGNOSIS — Z01.812 PRE-OPERATIVE LABORATORY EXAMINATION: ICD-10-CM

## 2024-07-15 LAB
ALBUMIN SERPL BCG-MCNC: 4.1 G/DL (ref 3.5–5)
ALP SERPL-CCNC: 62 U/L (ref 34–104)
ALT SERPL W P-5'-P-CCNC: 23 U/L (ref 7–52)
ANION GAP SERPL CALCULATED.3IONS-SCNC: 7 MMOL/L (ref 4–13)
AST SERPL W P-5'-P-CCNC: 16 U/L (ref 13–39)
BASOPHILS # BLD AUTO: 0.03 THOUSANDS/ÂΜL (ref 0–0.1)
BASOPHILS NFR BLD AUTO: 1 % (ref 0–1)
BILIRUB SERPL-MCNC: 0.45 MG/DL (ref 0.2–1)
BUN SERPL-MCNC: 18 MG/DL (ref 5–25)
CALCIUM SERPL-MCNC: 9.2 MG/DL (ref 8.4–10.2)
CHLORIDE SERPL-SCNC: 108 MMOL/L (ref 96–108)
CO2 SERPL-SCNC: 26 MMOL/L (ref 21–32)
CREAT SERPL-MCNC: 0.84 MG/DL (ref 0.6–1.3)
EOSINOPHIL # BLD AUTO: 0.06 THOUSAND/ÂΜL (ref 0–0.61)
EOSINOPHIL NFR BLD AUTO: 1 % (ref 0–6)
ERYTHROCYTE [DISTWIDTH] IN BLOOD BY AUTOMATED COUNT: 12.3 % (ref 11.6–15.1)
GFR SERPL CREATININE-BSD FRML MDRD: 87 ML/MIN/1.73SQ M
GLUCOSE SERPL-MCNC: 66 MG/DL (ref 65–140)
HCT VFR BLD AUTO: 44.1 % (ref 36.5–49.3)
HGB BLD-MCNC: 15.3 G/DL (ref 12–17)
IMM GRANULOCYTES # BLD AUTO: 0.04 THOUSAND/UL (ref 0–0.2)
IMM GRANULOCYTES NFR BLD AUTO: 1 % (ref 0–2)
LYMPHOCYTES # BLD AUTO: 1.3 THOUSANDS/ÂΜL (ref 0.6–4.47)
LYMPHOCYTES NFR BLD AUTO: 22 % (ref 14–44)
MCH RBC QN AUTO: 32.1 PG (ref 26.8–34.3)
MCHC RBC AUTO-ENTMCNC: 34.7 G/DL (ref 31.4–37.4)
MCV RBC AUTO: 93 FL (ref 82–98)
MONOCYTES # BLD AUTO: 0.5 THOUSAND/ÂΜL (ref 0.17–1.22)
MONOCYTES NFR BLD AUTO: 9 % (ref 4–12)
NEUTROPHILS # BLD AUTO: 3.95 THOUSANDS/ÂΜL (ref 1.85–7.62)
NEUTS SEG NFR BLD AUTO: 66 % (ref 43–75)
NRBC BLD AUTO-RTO: 0 /100 WBCS
PLATELET # BLD AUTO: 203 THOUSANDS/UL (ref 149–390)
PMV BLD AUTO: 10.8 FL (ref 8.9–12.7)
POTASSIUM SERPL-SCNC: 4.7 MMOL/L (ref 3.5–5.3)
PROT SERPL-MCNC: 6.4 G/DL (ref 6.4–8.4)
RBC # BLD AUTO: 4.76 MILLION/UL (ref 3.88–5.62)
SODIUM SERPL-SCNC: 141 MMOL/L (ref 135–147)
WBC # BLD AUTO: 5.88 THOUSAND/UL (ref 4.31–10.16)

## 2024-07-15 PROCEDURE — 85025 COMPLETE CBC W/AUTO DIFF WBC: CPT

## 2024-07-15 PROCEDURE — 87086 URINE CULTURE/COLONY COUNT: CPT

## 2024-07-15 PROCEDURE — 80053 COMPREHEN METABOLIC PANEL: CPT

## 2024-07-15 PROCEDURE — 36415 COLL VENOUS BLD VENIPUNCTURE: CPT

## 2024-07-16 LAB — BACTERIA UR CULT: NORMAL

## 2024-07-19 NOTE — PRE-PROCEDURE INSTRUCTIONS
Pre-Surgery Instructions:   Medication Instructions    escitalopram (LEXAPRO) 5 mg tablet Take day of surgery.    lovastatin (MEVACOR) 40 MG tablet Take day of surgery.   Medication instructions for day surgery reviewed. Please use only a sip of water to take your instructed medications. Avoid all over the counter vitamins, supplements and NSAIDS for one week prior to surgery per anesthesia guidelines. Tylenol is ok to take as needed.     You will receive a call one business day prior to surgery with an arrival time and hospital directions. If your surgery is scheduled on a Monday, the hospital will be calling you on the Friday prior to your surgery. If you have not heard from anyone by 8pm, please call the hospital supervisor through the hospital  at [phone]. ([place] [phone] or [place] [phone]).    Do not eat or drink anything after midnight the night before your surgery, including candy, mints, lifesavers, or chewing gum. Do not drink alcohol 24hrs before your surgery. Try not to smoke at least 24hrs before your surgery.       Follow the pre surgery showering instructions as listed in the “My Surgical Experience Booklet” or otherwise provided by your surgeon's office. Do not use a blade to shave the surgical area 1 week before surgery. It is okay to use a clean electric clippers up to 24 hours before surgery. Do not apply any lotions, creams, including makeup, cologne, deodorant, or perfumes after showering on the day of your surgery. Do not use dry shampoo, hair spray, hair gel, or any type of hair products.     No contact lenses, eye make-up, or artificial eyelashes. Remove nail polish, including gel polish, and any artificial, gel, or acrylic nails if possible. Remove all jewelry including rings and body piercing jewelry.     Wear causal clothing that is easy to take on and off. Consider your type of surgery.    Keep any valuables, jewelry, piercings at home. Please bring any specially  ordered equipment (sling, braces) if indicated.    Arrange for a responsible person to drive you to and from the hospital on the day of your surgery. Please confirm the visitor policy for the day of your procedure when you receive your phone call with an arrival time.     Call the surgeon's office with any new illnesses, exposures, or additional questions prior to surgery.    Please reference your “My Surgical Experience Booklet” for additional information to prepare for your upcoming surgery.

## 2024-07-20 DIAGNOSIS — F41.1 GAD (GENERALIZED ANXIETY DISORDER): ICD-10-CM

## 2024-07-22 RX ORDER — ESCITALOPRAM OXALATE 5 MG/1
5 TABLET ORAL DAILY
Qty: 90 TABLET | Refills: 1 | Status: SHIPPED | OUTPATIENT
Start: 2024-07-22

## 2024-07-23 NOTE — H&P
HISTORY AND PHYSICAL  ?  ?  Patient Name: Ezio Muir  Patient MRN: [id number]  Attending Provider: Judson Deluca MD  Service: Urology  Chief Complaint    Prostate cancer    HPI   Ezio Muir is a 73 y.o. male with prostate cancer.  I plan placement of fiducial markers and SpaceOAR at the request of radiation oncology.  Potential risks and complications discussed, and informed consent was given by the patient.  Medications  Meds/Allergies   lactated ringers, 125 mL/hr        Prior to Admission Medications   Prescriptions Last Dose Informant Patient Reported? Taking?   escitalopram (LEXAPRO) 5 mg tablet 7/25/2024  No Yes   Sig: TAKE 1 TABLET (5 MG TOTAL) BY MOUTH DAILY.   lovastatin (MEVACOR) 40 MG tablet 7/25/2024 Self No Yes   Sig: TAKE 1 TABLET BY MOUTH EVERY DAY      Facility-Administered Medications: None       Current Facility-Administered Medications:     ceFAZolin (ANCEF) IVPB (premix in dextrose) 2,000 mg 50 mL, 2,000 mg, Intravenous, Once, Judson Deluca MD    lactated ringers infusion, 125 mL/hr, Intravenous, Continuous, Franko Mariya Araujo MD  Review of Systems  10 point review of systems negative except as noted in HPI  Allergies  No Known Allergies  PMH  Past Medical History:   Diagnosis Date    Anxiety     Dupuytren contracture     bilateral    Hyperlipidemia      Past surgical history  Past Surgical History:   Procedure Laterality Date    COLONOSCOPY W/ POLYPECTOMY  09/23/2004    EYE SURGERY Bilateral November 2020    Cataract and laser on both eyes    HAND SURGERY Bilateral 12/2008    DUPUYTRENS CONTRACTURE SURGICAL RELEASE    IL BX PROSTATE STRTCTC SATURATION SAMPLING IMG GID N/A 2/23/2024    Procedure: TRANSPERINEAL ULTRASOUND GUIDED BIOPSY PROSTATE;  Surgeon: Melo Chris MD;  Location: [facility] OR;  Service: Urology     Social history  Social History     Tobacco Use    Smoking status: Some Days     Types: Cigars     Start date: 1980    Smokeless tobacco: Never    Tobacco comments:      Cigar once and a while when I play golf; on average I play two or three times a week... last cigar was 2.5 weeks ago   Vaping Use    Vaping status: Never Used   Substance Use Topics    Alcohol use: Yes     Alcohol/week: 7.0 standard drinks of alcohol     Types: 7 Standard drinks or equivalent per week     Comment: With dinner- drinks daily . last drink was 2/21    Drug use: No     ?  Physical Exam        Normocephalic/Atraumatic  Neck- supple  Lungs- normal respiratory effort  CV- RRR  Abdomen- soft, NT  Extremities- no edema  Neuro- Nonfocal          Judson Deluca MD

## 2024-07-24 ENCOUNTER — ANESTHESIA EVENT (OUTPATIENT)
Dept: PERIOP | Facility: HOSPITAL | Age: 74
End: 2024-07-24
Payer: MEDICARE

## 2024-07-24 NOTE — ANESTHESIA PREPROCEDURE EVALUATION
Procedure:  INSERTION OF FIDUCIAL MARKER ,SPACEOAR (Penis)    Relevant Problems   CARDIO   (+) HLD (hyperlipidemia)      /RENAL   (+) Benign prostatic hyperplasia with lower urinary tract symptoms   (+) Prostate cancer (HCC)      NEURO/PSYCH   (+) ARTEMIO (generalized anxiety disorder)        Physical Exam    Airway    Mallampati score: II  TM Distance: <3 FB  Neck ROM: full     Dental   No notable dental hx     Cardiovascular  Cardiovascular exam normal    Pulmonary  Pulmonary exam normal     Other Findings        Anesthesia Plan  ASA Score- 2     Anesthesia Type- IV sedation with anesthesia with ASA Monitors.         Additional Monitors:     Airway Plan: LMA.           Plan Factors-Exercise tolerance (METS): >4 METS.    Chart reviewed. EKG reviewed.  Existing labs reviewed.     Patient is not a current smoker. Patient not instructed to abstain from smoking on day of procedure. Patient did not smoke on day of surgery.            Induction- intravenous.    Postoperative Plan-         Informed Consent- Anesthetic plan and risks discussed with patient.  I personally reviewed this patient with the CRNA. Discussed and agreed on the Anesthesia Plan with the CRNA..

## 2024-07-25 ENCOUNTER — ANESTHESIA (OUTPATIENT)
Dept: PERIOP | Facility: HOSPITAL | Age: 74
End: 2024-07-25
Payer: MEDICARE

## 2024-07-25 ENCOUNTER — HOSPITAL ENCOUNTER (OUTPATIENT)
Facility: HOSPITAL | Age: 74
Setting detail: OUTPATIENT SURGERY
Discharge: HOME | End: 2024-07-25
Attending: UROLOGY | Admitting: UROLOGY
Payer: MEDICARE

## 2024-07-25 VITALS
OXYGEN SATURATION: 96 % | DIASTOLIC BLOOD PRESSURE: 78 MMHG | RESPIRATION RATE: 16 BRPM | HEART RATE: 63 BPM | TEMPERATURE: 97.1 F | SYSTOLIC BLOOD PRESSURE: 169 MMHG

## 2024-07-25 PROCEDURE — A4648 IMPLANTABLE TISSUE MARKER: HCPCS | Performed by: UROLOGY

## 2024-07-25 PROCEDURE — C1889 IMPLANT/INSERT DEVICE, NOC: HCPCS | Performed by: UROLOGY

## 2024-07-25 PROCEDURE — 55874 TPRNL PLMT BIODEGRDABL MATRL: CPT | Performed by: UROLOGY

## 2024-07-25 PROCEDURE — 55876 PLACE RT DEVICE/MARKER PROS: CPT | Performed by: UROLOGY

## 2024-07-25 PROCEDURE — NC001 PR NO CHARGE: Performed by: UROLOGY

## 2024-07-25 DEVICE — SPACEOAR SYSTEMS
Type: IMPLANTABLE DEVICE | Site: PROSTATE | Status: FUNCTIONAL
Brand: SPACEOAR VUE™ SYSTEM - 10ML

## 2024-07-25 DEVICE — STERILE PLACEMENT NEEDLES (17GA ETW X 20CM) WITH BONE WAX AND (1.2 X 3MM) SOFT TISSUE GOLD MARKER [3]
Type: IMPLANTABLE DEVICE | Site: PROSTATE | Status: FUNCTIONAL
Brand: FIDUCIAL MARKER KIT

## 2024-07-25 RX ORDER — MIDAZOLAM HYDROCHLORIDE 2 MG/2ML
INJECTION, SOLUTION INTRAMUSCULAR; INTRAVENOUS AS NEEDED
Status: DISCONTINUED | OUTPATIENT
Start: 2024-07-25 | End: 2024-07-25

## 2024-07-25 RX ORDER — FENTANYL CITRATE/PF 50 MCG/ML
25 SYRINGE (ML) INJECTION
Status: DISCONTINUED | OUTPATIENT
Start: 2024-07-25 | End: 2024-07-25 | Stop reason: HOSPADM

## 2024-07-25 RX ORDER — ONDANSETRON 2 MG/ML
4 INJECTION INTRAMUSCULAR; INTRAVENOUS ONCE AS NEEDED
Status: DISCONTINUED | OUTPATIENT
Start: 2024-07-25 | End: 2024-07-25 | Stop reason: HOSPADM

## 2024-07-25 RX ORDER — SODIUM CHLORIDE, SODIUM LACTATE, POTASSIUM CHLORIDE, CALCIUM CHLORIDE 600; 310; 30; 20 MG/100ML; MG/100ML; MG/100ML; MG/100ML
125 INJECTION, SOLUTION INTRAVENOUS CONTINUOUS
Status: DISCONTINUED | OUTPATIENT
Start: 2024-07-25 | End: 2024-07-25 | Stop reason: HOSPADM

## 2024-07-25 RX ORDER — CEFAZOLIN SODIUM 2 G/50ML
2000 SOLUTION INTRAVENOUS ONCE
Status: COMPLETED | OUTPATIENT
Start: 2024-07-25 | End: 2024-07-25

## 2024-07-25 RX ORDER — ONDANSETRON 2 MG/ML
INJECTION INTRAMUSCULAR; INTRAVENOUS AS NEEDED
Status: DISCONTINUED | OUTPATIENT
Start: 2024-07-25 | End: 2024-07-25

## 2024-07-25 RX ORDER — ACETAMINOPHEN 325 MG/1
975 TABLET ORAL ONCE
Status: DISCONTINUED | OUTPATIENT
Start: 2024-07-25 | End: 2024-07-25 | Stop reason: HOSPADM

## 2024-07-25 RX ORDER — LIDOCAINE HYDROCHLORIDE 10 MG/ML
INJECTION, SOLUTION EPIDURAL; INFILTRATION; INTRACAUDAL; PERINEURAL AS NEEDED
Status: DISCONTINUED | OUTPATIENT
Start: 2024-07-25 | End: 2024-07-25

## 2024-07-25 RX ORDER — SODIUM CHLORIDE 9 MG/ML
INJECTION INTRAVENOUS AS NEEDED
Status: DISCONTINUED | OUTPATIENT
Start: 2024-07-25 | End: 2024-07-25 | Stop reason: HOSPADM

## 2024-07-25 RX ORDER — PROPOFOL 10 MG/ML
INJECTION, EMULSION INTRAVENOUS AS NEEDED
Status: DISCONTINUED | OUTPATIENT
Start: 2024-07-25 | End: 2024-07-25

## 2024-07-25 RX ORDER — FENTANYL CITRATE 50 UG/ML
INJECTION, SOLUTION INTRAMUSCULAR; INTRAVENOUS AS NEEDED
Status: DISCONTINUED | OUTPATIENT
Start: 2024-07-25 | End: 2024-07-25

## 2024-07-25 RX ORDER — DEXAMETHASONE SODIUM PHOSPHATE 10 MG/ML
INJECTION, SOLUTION INTRAMUSCULAR; INTRAVENOUS AS NEEDED
Status: DISCONTINUED | OUTPATIENT
Start: 2024-07-25 | End: 2024-07-25

## 2024-07-25 RX ADMIN — SODIUM CHLORIDE, SODIUM LACTATE, POTASSIUM CHLORIDE, AND CALCIUM CHLORIDE: .6; .31; .03; .02 INJECTION, SOLUTION INTRAVENOUS at 08:05

## 2024-07-25 RX ADMIN — PROPOFOL 80 MCG/KG/MIN: 10 INJECTION, EMULSION INTRAVENOUS at 08:18

## 2024-07-25 RX ADMIN — LIDOCAINE HYDROCHLORIDE 50 MG: 10 INJECTION, SOLUTION EPIDURAL; INFILTRATION; INTRACAUDAL; PERINEURAL at 08:17

## 2024-07-25 RX ADMIN — PROPOFOL 40 MG: 10 INJECTION, EMULSION INTRAVENOUS at 08:17

## 2024-07-25 RX ADMIN — DEXAMETHASONE SODIUM PHOSPHATE 5 MG: 10 INJECTION, SOLUTION INTRAMUSCULAR; INTRAVENOUS at 08:18

## 2024-07-25 RX ADMIN — FENTANYL CITRATE 25 MCG: 50 INJECTION, SOLUTION INTRAMUSCULAR; INTRAVENOUS at 08:20

## 2024-07-25 RX ADMIN — ONDANSETRON 4 MG: 2 INJECTION INTRAMUSCULAR; INTRAVENOUS at 08:18

## 2024-07-25 RX ADMIN — MIDAZOLAM HYDROCHLORIDE 2 MG: 1 INJECTION, SOLUTION INTRAMUSCULAR; INTRAVENOUS at 08:11

## 2024-07-25 RX ADMIN — CEFAZOLIN SODIUM 2000 MG: 2 SOLUTION INTRAVENOUS at 08:11

## 2024-07-25 RX ADMIN — FENTANYL CITRATE 50 MCG: 50 INJECTION, SOLUTION INTRAMUSCULAR; INTRAVENOUS at 08:11

## 2024-07-25 NOTE — ANESTHESIA POSTPROCEDURE EVALUATION
Post-Op Assessment Note    CV Status:  Stable    Pain management: satisfactory to patient       Mental Status:  Awake   Hydration Status:  Euvolemic   PONV Controlled:  Controlled   Airway Patency:  Patent     Post Op Vitals Reviewed: Yes    No anethesia notable event occurred.    Staff: Anesthesiologist, CRNA               BP      Temp      Pulse     Resp      SpO2

## 2024-07-25 NOTE — INTERVAL H&P NOTE
H&P reviewed. After examining the patient I find no changes in the patients condition since the H&P had been written.    Vitals:    07/25/24 0643   BP: (!) 178/93   Pulse: 65   Resp: 18   Temp: (!) 97 °F (36.1 °C)   SpO2: 97%   Procedure and risks discussed with the patient in the holding area.  Consent form signed.  Risk of rectal injury discussed.

## 2024-07-25 NOTE — DISCHARGE INSTRUCTIONS
"Patient Education     Quitting smoking   The Basics   Written by the doctors and editors at [facility]   What are the benefits of quitting smoking? -- Quitting smoking can dramatically improve your health and help you live longer. It lowers your risk of heart disease, lung disease, kidney failure, cancer, infection, stomach problems, and diabetes.  Quitting smoking can also lower your chances of getting osteoporosis, a condition that makes your bones weak.  Quitting is not easy for most people, and it might take several tries to completely quit. But help and support are available. Quitting smoking will improve your health no matter how old you are, even if you have smoked for a long time.  What should I do if I want to quit smoking? -- It's a good idea to start by talking with your doctor or nurse. It is possible to quit on your own, without help. But getting help greatly increases your chances of quitting successfully.  When you are ready to quit, you will make a plan to:   Set a quit date.   Tell your family and friends that you plan to quit.   Plan ahead for the challenges you will face, such as cigarette cravings.   Remove cigarettes from your home, car, and work.  How can my doctor or nurse help? -- Your doctor or nurse can give you advice on the best way to quit. They can also give you medicines to:   Reduce your craving for cigarettes   Reduce your \"withdrawal\" symptoms (symptoms that happen when you stop smoking)  Your doctor or nurse can also help you find a counselor to talk to. For most people who are trying to quit smoking, it works best to use both medicines and counseling.  You can also get help from a free phone line (8-046-QUITNOW, or 1-245.199.8707) or go online to www.smokefree.gov.  What are the symptoms of withdrawal? -- When you stop smoking, you might have symptoms such as:   Trouble sleeping   Feeling irritable, anxious, or restless   Getting frustrated or angry   Having trouble thinking " clearly  These symptoms can be hard to deal with, which is why it can be so hard to quit. But medicines can help.  Some people who stop smoking become temporarily depressed. Some people need treatment for depression, such as counseling or medicines or both. People with depression might:   No longer enjoy or care about doing the things they used to like to do   Feel sad, down, hopeless, nervous, or cranky most of the day, almost every day   Lose or gain weight   Sleep too much or too little   Feel tired or like they have no energy   Feel guilty or like they are worth nothing   Forget things or feel confused   Move and speak more slowly than usual   Act restless or have trouble staying still   Think about death or suicide  If you think you might be depressed, tell your doctor or nurse right away. They can talk to you about your symptoms and recommend treatment if needed.  Get help right away if you are thinking of hurting or killing yourself! -- Sometimes, people with depression think of hurting or killing themselves. If you ever feel like you might hurt yourself, help is available:   In the , contact the [employer] Suicide & Crisis Lifeline:   To speak to someone, call or text [employer].   To talk to someone online, go to www.[employer].org/chat.   Call your doctor or nurse, and tell them that it is an emergency.   Call for an ambulance (in the [place] and [place], call 9-1-1).   Go to the emergency department at your local hospital.  If you think your partner might have depression, or if you are worried that they might hurt themselves, get them help right away.  How does counseling work? -- A counselor can help you figure out:   What triggers you to want to smoke, and how to handle these situations   How to resist cravings   What you can do differently if you have tried to quit before  You can meet with a counselor in 1-on-1 sessions or as part of a group. There are other ways to get counseling, too, such as over the phone, through  "text messaging, or online.  How do medicines help you stop smoking? -- Different medicines work in different ways:   Nicotine replacement therapy - Nicotine is the main drug in cigarettes, and the reason they are addictive. These medicines reduce your body's craving for nicotine. They also help with withdrawal symptoms.  There are different forms of nicotine replacement, including skin patches, lozenges, gum, nasal sprays, and inhalers. Most can be bought without a prescription. Also, health insurance might cover some or all of the cost.  It often helps to use 2 forms of nicotine replacement. For example, you might wear a patch all the time, plus use gum or lozenges when you get a craving to smoke.   Varenicline - Varenicline (brand names: Chantix, Champix) is a prescription medicine that reduces withdrawal symptoms and cigarette cravings. Varenicline can increase the effects of alcohol in some people. It's a good idea to limit drinking while you're taking it, at least until you know how it affects you.  Even if you are not yet ready to commit to a quit date, varenicline can help reduce cravings. This can make it easier to quit when you are ready.   Bupropion - Bupropion (sample brand names: Wellbutrin, Zyban) is a prescription medicine that reduces your desire to smoke. It is also available in a generic version, which is cheaper than the brand name medicines. Doctors do not usually prescribe bupropion for people with seizures or who have had seizures in the past.  It might also be helpful to combine nicotine replacement with bupropion or varenicline. In some cases, a person might even take both bupropion and varenicline. Your doctor or nurse can help you figure out the best combination for you.  Can vaping help me quit? -- Sometimes, people wonder if vaping can help them quit smoking. Vaping is using electronic cigarettes, or \"e-cigarettes.\"  Doctors recommend using medicines and counseling to quit smoking. These " methods have been studied the most. But for people who have tried these and not been able to quit, switching to vaping might be an option. There are some things to remember:   Vaping might be less harmful than smoking regular cigarettes. But e-cigarettes still contain nicotine as well as other substances that might be harmful.   If you decide to try vaping to help you quit, it's important to switch completely from regular cigarettes to e-cigarettes. Using both will probably not be helpful, and might increase the risks of harm.   It's not clear how vaping can affect a person's health in the long term.  For these reasons, doctors recommend that if you do try vaping to help you quit smoking, you still make a plan to quit vaping eventually.  If you are interested in trying vaping to help you quit smoking, it's a good idea to talk to your doctor or nurse first.  What if I am pregnant and I smoke? -- If you are pregnant, it's really important for the health of your baby that you quit. Ask your doctor what options you have, and what is safest for your baby.  What if I have already smoked for a long time? -- The longer you have smoked, the higher your chances are of having health problems. But it is never too late to quit smoking. It helps your health even if you are older or have smoked for many years. The best thing you can do to lower your chance of having a health problem caused by smoking is to quit.  Will I gain weight if I quit? -- You might gain a few pounds. This can be frustrating for some people. But it's important to remember that you are improving your health by quitting smoking. You can help prevent gaining a lot of weight by staying active and eating a healthy diet.  What if I am not able to quit? -- If you don't quit on your first try, or if you quit but then start smoking again, don't give up hope. Lots of people have to try more than once before they are able to completely quit.  It might help to try to  understand why quitting did not work. There might be something you can do differently when you try again. It can help to figure out which situations make you want to smoke, so you can avoid them.  What else can I do to improve my chances of quitting? -- You can:   Get regular exercise. Any type of physical activity, even gentle forms of movement, is good for your health. Physical activity can also help reduce stress.   Stay away from people who smoke and places that make you want to smoke. If people close to you smoke, ask them to quit with you or avoid smoking around you.   Carry gum, hard candy, or something to put in your mouth. If you get a craving for a cigarette, try 1 of these instead.   Don't give up, even if you start smoking again. It takes most people a few tries before they succeed.  All topics are updated as new evidence becomes available and our peer review process is complete.  This topic retrieved from [employer] on: Mar 14, 2024.  Topic 33627 Version 33.0  Release: 32.2.4 - C32.72  © 2024 UpToDate, Inc. and/or its affiliates. All rights reserved.  Consumer Information Use and Disclaimer   Disclaimer: This generalized information is a limited summary of diagnosis, treatment, and/or medication information. It is not meant to be comprehensive and should be used as a tool to help the user understand and/or assess potential diagnostic and treatment options. It does NOT include all information about conditions, treatments, medications, side effects, or risks that may apply to a specific patient. It is not intended to be medical advice or a substitute for the medical advice, diagnosis, or treatment of a health care provider based on the health care provider's examination and assessment of a patient's specific and unique circumstances. Patients must speak with a health care provider for complete information about their health, medical questions, and treatment options, including any risks or benefits regarding  use of medications. This information does not endorse any treatments or medications as safe, effective, or approved for treating a specific patient. UpToDate, Inc. and its affiliates disclaim any warranty or liability relating to this information or the use thereof.The use of this information is governed by the Terms of Use, available at https://www.[employer].com/en/know/clinical-effectiveness-terms. 2024© UpToDate, Inc. and its affiliates and/or licensors. All rights reserved.  Copyright   © 2024 UpToDate, Inc. and/or its affiliates. All rights reserved.

## 2024-07-25 NOTE — DISCHARGE INSTR - AVS FIRST PAGE
Pt changed to paper scrubs, belongings given to security, sitter at bedside.   Pt repeatedly saying \"I'm confused\" and rocking in bed     Kosta Zapata RN  09/10/23 2350 Rest and drink plenty of fluids. It is normal to have blood in the urine and some rectal bleeding. Blood in the semen will occur when sexually active- this can last several months depending on how sexually active you are. Use tylenol or ibuprofen for pain. An ice pack to the perineum can also be helpful. Call the office or seek medical attention for fever, heavy urinary or rectal bleeding, or difficulty voiding. If you are on anticoagulation this can be resumed once urine has cleared or upon the instruction of your cardiologist or internist.

## 2024-07-25 NOTE — NURSING NOTE
Pt is awake,alert,tolerated diet, written and verbal instructions given to pt and his wife, who verbalize an understanding,no complaints offered.

## 2024-07-25 NOTE — OP NOTE
OPERATIVE REPORT  PATIENT NAME: Ezio Muir    :  1950  MRN: [id number]  Pt Location:  OR ROOM 10    SURGERY DATE: 2024    Surgeons and Role:     * Judson Deluca MD - Primary     * aPtrick Almeida MD - Assisting    Preop Diagnosis:  Malignant neoplasm of prostate (HCC) [C61]    Post-Op Diagnosis Codes:     * Malignant neoplasm of prostate (HCC) [C61]    Procedure(s):  INSERTION OF FIDUCIAL MARKER .SPACEOAR    Specimen(s):  * No specimens in log *    Estimated Blood Loss:   Minimal    Drains:  * No LDAs found *    Anesthesia Type:   IV Sedation with Anesthesia    Operative Indications:  Malignant neoplasm of prostate (HCC) [C61]      Operative Findings:  Successful placement of markers and SpaceOar- [place]      Complications:   None    Procedure and Technique:  The patient was brought to the operating room properly identified.  Intravenous sedation was administered.  He was placed in lithotomy position prepped and draped in usual sterile fashion.    Intravenous antibiotic was administered by Anesthesia.  An appropriate time-out was performed.  The patient was prepped and draped.  The scrotum was taped up to the anterior abdominal wall to allow access to the perineum.  Digital rectal examination was then performed.  Transrectal ultrasound probe was then placed into the rectum and the prostate visualized. Local anesthesia was then administered to the perineal skin and subcutaneous tissue.  Under ultrasound guidance gold fiducials were then placed into the prostate in the right base, left base and right apex of the gland.      SpaceOAR Hydrogel was then prepared as described in the manufacture's instructions for use.  With the patient maintained  in dorsal lithotomy position, the transrectal ultrasound probe was positioned  to enable visual guidance of the needle into the space between the prostate and the rectum.  Under transrectal ultrasound guidance, the 15 cm 18 gauge needle was  inserted  through the skin, subcutaneous tissue and rectal urethralis muscle and the needle tip advanced into the perirectal fat inferior to the prostate all by using a transperineal approach and with side fire transrectal ultrasound guidance.  The needle position was confirmed in both sagittal and axial fields.  Saline was used to dissect the space between Denonvilliers' fascia and the anterior rectal wall.  In this way a space was created with hydro dissection.  With the needle tip at mid gland, the axial field was used to  confirm the needle was not in the rectal wall.  While maintaining  desired position aspiration was done to ensure that the needle was not in a vascular space.  The assembled SpaceOAR  delivery system was then attached to the 18 gauge needle.  Under ultrasound guidance in the sagittal plane, smooth continuous  injection technique was used to dispense the SpaceOAR  Hydrogel into the space between the prostate and rectum.  The entire syringe contents (10 mL) was injected without stopping.  Optimal visualization of the needle during Hydrogel administration was maintained at all times.  No suspected penetration or compromise of the rectal wall occurred.    The patient tolerated procedure well.  The needle was removed.  The patient was taken out of lithotomy position and awakened from anesthesia and taken to the recovery room in satisfactory condition.   I was present for the entire procedure.    Patient Disposition:  PACU         SIGNATURE: Judson Deluca MD  DATE: July 25, 2024  TIME: 8:41 AM

## 2024-07-29 ENCOUNTER — TELEPHONE (OUTPATIENT)
Age: 74
End: 2024-07-29

## 2024-07-29 NOTE — TELEPHONE ENCOUNTER
There are no specific meds to take for a stye. The best thing is to use a warm compress to help it pop. Antibiotic eye drops may help prevent infection but will not speed up resolution.

## 2024-07-29 NOTE — TELEPHONE ENCOUNTER
Patient c/o stye in the lower lid of his right eye. He noticed this forming on Saturday. Advised patient to start warm compresses on his eye. Asking if his PCP can give him eye drops or if there is any other meds he can take for this to help?

## 2024-08-01 ENCOUNTER — APPOINTMENT (OUTPATIENT)
Dept: RADIATION ONCOLOGY | Facility: HOSPITAL | Age: 74
End: 2024-08-01
Attending: INTERNAL MEDICINE
Payer: MEDICARE

## 2024-08-01 PROCEDURE — 77334 RADIATION TREATMENT AID(S): CPT | Performed by: INTERNAL MEDICINE

## 2024-08-13 PROCEDURE — 77301 RADIOTHERAPY DOSE PLAN IMRT: CPT | Performed by: INTERNAL MEDICINE

## 2024-08-13 PROCEDURE — 77338 DESIGN MLC DEVICE FOR IMRT: CPT | Performed by: INTERNAL MEDICINE

## 2024-08-13 PROCEDURE — 77300 RADIATION THERAPY DOSE PLAN: CPT | Performed by: INTERNAL MEDICINE

## 2024-08-22 ENCOUNTER — APPOINTMENT (OUTPATIENT)
Dept: RADIATION ONCOLOGY | Facility: HOSPITAL | Age: 74
End: 2024-08-22
Attending: INTERNAL MEDICINE
Payer: MEDICARE

## 2024-08-22 PROCEDURE — 77385 HB NTSTY MODUL RAD TX DLVR SMPL: CPT | Performed by: RADIOLOGY

## 2024-08-22 PROCEDURE — 77014 CHG CT GUIDANCE RADIATION THERAPY FLDS PLACEMENT: CPT | Performed by: RADIOLOGY

## 2024-08-23 ENCOUNTER — APPOINTMENT (OUTPATIENT)
Dept: RADIATION ONCOLOGY | Facility: HOSPITAL | Age: 74
End: 2024-08-23
Attending: INTERNAL MEDICINE
Payer: MEDICARE

## 2024-08-23 PROCEDURE — 77385 HB NTSTY MODUL RAD TX DLVR SMPL: CPT | Performed by: RADIOLOGY

## 2024-08-23 PROCEDURE — 77014 CHG CT GUIDANCE RADIATION THERAPY FLDS PLACEMENT: CPT | Performed by: RADIOLOGY

## 2024-08-26 ENCOUNTER — APPOINTMENT (OUTPATIENT)
Dept: RADIATION ONCOLOGY | Facility: HOSPITAL | Age: 74
End: 2024-08-26
Attending: INTERNAL MEDICINE
Payer: MEDICARE

## 2024-08-26 PROCEDURE — 77014 CHG CT GUIDANCE RADIATION THERAPY FLDS PLACEMENT: CPT | Performed by: STUDENT IN AN ORGANIZED HEALTH CARE EDUCATION/TRAINING PROGRAM

## 2024-08-26 PROCEDURE — 77385 HB NTSTY MODUL RAD TX DLVR SMPL: CPT | Performed by: STUDENT IN AN ORGANIZED HEALTH CARE EDUCATION/TRAINING PROGRAM

## 2024-08-27 ENCOUNTER — APPOINTMENT (OUTPATIENT)
Dept: RADIATION ONCOLOGY | Facility: HOSPITAL | Age: 74
End: 2024-08-27
Attending: INTERNAL MEDICINE
Payer: MEDICARE

## 2024-08-27 PROCEDURE — 77014 CHG CT GUIDANCE RADIATION THERAPY FLDS PLACEMENT: CPT | Performed by: RADIOLOGY

## 2024-08-27 PROCEDURE — 77385 HB NTSTY MODUL RAD TX DLVR SMPL: CPT | Performed by: RADIOLOGY

## 2024-08-28 ENCOUNTER — APPOINTMENT (OUTPATIENT)
Dept: RADIATION ONCOLOGY | Facility: HOSPITAL | Age: 74
End: 2024-08-28
Attending: INTERNAL MEDICINE
Payer: MEDICARE

## 2024-08-28 PROCEDURE — 77385 HB NTSTY MODUL RAD TX DLVR SMPL: CPT | Performed by: RADIOLOGY

## 2024-08-28 PROCEDURE — 77014 CHG CT GUIDANCE RADIATION THERAPY FLDS PLACEMENT: CPT | Performed by: RADIOLOGY

## 2024-08-28 PROCEDURE — 77336 RADIATION PHYSICS CONSULT: CPT | Performed by: RADIOLOGY

## 2024-08-29 ENCOUNTER — APPOINTMENT (OUTPATIENT)
Dept: RADIATION ONCOLOGY | Facility: HOSPITAL | Age: 74
End: 2024-08-29
Attending: INTERNAL MEDICINE
Payer: MEDICARE

## 2024-08-29 PROCEDURE — 77385 HB NTSTY MODUL RAD TX DLVR SMPL: CPT | Performed by: RADIOLOGY

## 2024-08-29 PROCEDURE — 77014 CHG CT GUIDANCE RADIATION THERAPY FLDS PLACEMENT: CPT | Performed by: RADIOLOGY

## 2024-08-30 ENCOUNTER — APPOINTMENT (OUTPATIENT)
Dept: RADIATION ONCOLOGY | Facility: HOSPITAL | Age: 74
End: 2024-08-30
Attending: INTERNAL MEDICINE
Payer: MEDICARE

## 2024-08-30 PROCEDURE — 77385 HB NTSTY MODUL RAD TX DLVR SMPL: CPT | Performed by: STUDENT IN AN ORGANIZED HEALTH CARE EDUCATION/TRAINING PROGRAM

## 2024-08-30 PROCEDURE — 77014 CHG CT GUIDANCE RADIATION THERAPY FLDS PLACEMENT: CPT | Performed by: STUDENT IN AN ORGANIZED HEALTH CARE EDUCATION/TRAINING PROGRAM

## 2024-09-03 ENCOUNTER — APPOINTMENT (OUTPATIENT)
Dept: RADIATION ONCOLOGY | Facility: HOSPITAL | Age: 74
End: 2024-09-03
Attending: INTERNAL MEDICINE
Payer: MEDICARE

## 2024-09-03 PROCEDURE — 77014 CHG CT GUIDANCE RADIATION THERAPY FLDS PLACEMENT: CPT | Performed by: RADIOLOGY

## 2024-09-03 PROCEDURE — 77385 HB NTSTY MODUL RAD TX DLVR SMPL: CPT | Performed by: RADIOLOGY

## 2024-09-04 ENCOUNTER — APPOINTMENT (OUTPATIENT)
Dept: RADIATION ONCOLOGY | Facility: HOSPITAL | Age: 74
End: 2024-09-04
Attending: INTERNAL MEDICINE
Payer: MEDICARE

## 2024-09-04 PROCEDURE — 77014 CHG CT GUIDANCE RADIATION THERAPY FLDS PLACEMENT: CPT | Performed by: STUDENT IN AN ORGANIZED HEALTH CARE EDUCATION/TRAINING PROGRAM

## 2024-09-04 PROCEDURE — 77385 HB NTSTY MODUL RAD TX DLVR SMPL: CPT | Performed by: STUDENT IN AN ORGANIZED HEALTH CARE EDUCATION/TRAINING PROGRAM

## 2024-09-05 ENCOUNTER — APPOINTMENT (OUTPATIENT)
Dept: RADIATION ONCOLOGY | Facility: HOSPITAL | Age: 74
End: 2024-09-05
Attending: INTERNAL MEDICINE
Payer: MEDICARE

## 2024-09-05 PROCEDURE — 77014 CHG CT GUIDANCE RADIATION THERAPY FLDS PLACEMENT: CPT | Performed by: RADIOLOGY

## 2024-09-05 PROCEDURE — 77336 RADIATION PHYSICS CONSULT: CPT | Performed by: RADIOLOGY

## 2024-09-05 PROCEDURE — 77385 HB NTSTY MODUL RAD TX DLVR SMPL: CPT | Performed by: RADIOLOGY

## 2024-09-06 ENCOUNTER — APPOINTMENT (OUTPATIENT)
Dept: RADIATION ONCOLOGY | Facility: HOSPITAL | Age: 74
End: 2024-09-06
Attending: INTERNAL MEDICINE
Payer: MEDICARE

## 2024-09-06 PROCEDURE — 77385 HB NTSTY MODUL RAD TX DLVR SMPL: CPT | Performed by: RADIOLOGY

## 2024-09-06 PROCEDURE — 77427 RADIATION TX MANAGEMENT X5: CPT | Performed by: RADIOLOGY

## 2024-09-06 PROCEDURE — 77014 CHG CT GUIDANCE RADIATION THERAPY FLDS PLACEMENT: CPT | Performed by: RADIOLOGY

## 2024-09-09 ENCOUNTER — APPOINTMENT (OUTPATIENT)
Dept: RADIATION ONCOLOGY | Facility: HOSPITAL | Age: 74
End: 2024-09-09
Attending: INTERNAL MEDICINE
Payer: MEDICARE

## 2024-09-09 PROCEDURE — 77014 CHG CT GUIDANCE RADIATION THERAPY FLDS PLACEMENT: CPT | Performed by: RADIOLOGY

## 2024-09-09 PROCEDURE — 77385 HB NTSTY MODUL RAD TX DLVR SMPL: CPT | Performed by: RADIOLOGY

## 2024-09-10 ENCOUNTER — APPOINTMENT (OUTPATIENT)
Dept: RADIATION ONCOLOGY | Facility: HOSPITAL | Age: 74
End: 2024-09-10
Attending: INTERNAL MEDICINE
Payer: MEDICARE

## 2024-09-10 PROCEDURE — 77385 HB NTSTY MODUL RAD TX DLVR SMPL: CPT | Performed by: RADIOLOGY

## 2024-09-10 PROCEDURE — 77014 CHG CT GUIDANCE RADIATION THERAPY FLDS PLACEMENT: CPT | Performed by: RADIOLOGY

## 2024-09-11 ENCOUNTER — APPOINTMENT (OUTPATIENT)
Dept: RADIATION ONCOLOGY | Facility: HOSPITAL | Age: 74
End: 2024-09-11
Attending: INTERNAL MEDICINE
Payer: MEDICARE

## 2024-09-11 PROCEDURE — 77014 CHG CT GUIDANCE RADIATION THERAPY FLDS PLACEMENT: CPT | Performed by: RADIOLOGY

## 2024-09-11 PROCEDURE — 77385 HB NTSTY MODUL RAD TX DLVR SMPL: CPT | Performed by: RADIOLOGY

## 2024-09-12 ENCOUNTER — APPOINTMENT (OUTPATIENT)
Dept: RADIATION ONCOLOGY | Facility: HOSPITAL | Age: 74
End: 2024-09-12
Attending: INTERNAL MEDICINE
Payer: MEDICARE

## 2024-09-12 PROCEDURE — 77014 CHG CT GUIDANCE RADIATION THERAPY FLDS PLACEMENT: CPT | Performed by: RADIOLOGY

## 2024-09-12 PROCEDURE — 77336 RADIATION PHYSICS CONSULT: CPT | Performed by: RADIOLOGY

## 2024-09-12 PROCEDURE — 77385 HB NTSTY MODUL RAD TX DLVR SMPL: CPT | Performed by: RADIOLOGY

## 2024-09-13 ENCOUNTER — APPOINTMENT (OUTPATIENT)
Dept: RADIATION ONCOLOGY | Facility: HOSPITAL | Age: 74
End: 2024-09-13
Attending: INTERNAL MEDICINE
Payer: MEDICARE

## 2024-09-13 PROCEDURE — 77014 CHG CT GUIDANCE RADIATION THERAPY FLDS PLACEMENT: CPT | Performed by: RADIOLOGY

## 2024-09-13 PROCEDURE — 77385 HB NTSTY MODUL RAD TX DLVR SMPL: CPT | Performed by: RADIOLOGY

## 2024-09-13 PROCEDURE — 77427 RADIATION TX MANAGEMENT X5: CPT | Performed by: RADIOLOGY

## 2024-09-16 ENCOUNTER — APPOINTMENT (OUTPATIENT)
Dept: RADIATION ONCOLOGY | Facility: HOSPITAL | Age: 74
End: 2024-09-16
Attending: INTERNAL MEDICINE
Payer: MEDICARE

## 2024-09-16 PROCEDURE — 77014 CHG CT GUIDANCE RADIATION THERAPY FLDS PLACEMENT: CPT | Performed by: STUDENT IN AN ORGANIZED HEALTH CARE EDUCATION/TRAINING PROGRAM

## 2024-09-16 PROCEDURE — 77385 HB NTSTY MODUL RAD TX DLVR SMPL: CPT | Performed by: STUDENT IN AN ORGANIZED HEALTH CARE EDUCATION/TRAINING PROGRAM

## 2024-09-17 ENCOUNTER — APPOINTMENT (OUTPATIENT)
Dept: RADIATION ONCOLOGY | Facility: HOSPITAL | Age: 74
End: 2024-09-17
Attending: INTERNAL MEDICINE
Payer: MEDICARE

## 2024-09-17 PROCEDURE — 77014 CHG CT GUIDANCE RADIATION THERAPY FLDS PLACEMENT: CPT | Performed by: RADIOLOGY

## 2024-09-17 PROCEDURE — 77385 HB NTSTY MODUL RAD TX DLVR SMPL: CPT | Performed by: RADIOLOGY

## 2024-09-18 ENCOUNTER — APPOINTMENT (OUTPATIENT)
Dept: RADIATION ONCOLOGY | Facility: HOSPITAL | Age: 74
End: 2024-09-18
Attending: INTERNAL MEDICINE
Payer: MEDICARE

## 2024-09-18 PROCEDURE — 77014 CHG CT GUIDANCE RADIATION THERAPY FLDS PLACEMENT: CPT | Performed by: RADIOLOGY

## 2024-09-18 PROCEDURE — 77385 HB NTSTY MODUL RAD TX DLVR SMPL: CPT | Performed by: RADIOLOGY

## 2024-09-19 ENCOUNTER — APPOINTMENT (OUTPATIENT)
Dept: RADIATION ONCOLOGY | Facility: HOSPITAL | Age: 74
End: 2024-09-19
Attending: INTERNAL MEDICINE
Payer: MEDICARE

## 2024-09-19 PROCEDURE — 77385 HB NTSTY MODUL RAD TX DLVR SMPL: CPT | Performed by: RADIOLOGY

## 2024-09-19 PROCEDURE — 77014 CHG CT GUIDANCE RADIATION THERAPY FLDS PLACEMENT: CPT | Performed by: RADIOLOGY

## 2024-09-19 PROCEDURE — 77336 RADIATION PHYSICS CONSULT: CPT | Performed by: RADIOLOGY

## 2024-09-20 ENCOUNTER — APPOINTMENT (OUTPATIENT)
Dept: RADIATION ONCOLOGY | Facility: HOSPITAL | Age: 74
End: 2024-09-20
Attending: INTERNAL MEDICINE
Payer: MEDICARE

## 2024-09-20 PROCEDURE — 77385 HB NTSTY MODUL RAD TX DLVR SMPL: CPT | Performed by: STUDENT IN AN ORGANIZED HEALTH CARE EDUCATION/TRAINING PROGRAM

## 2024-09-20 PROCEDURE — 77014 CHG CT GUIDANCE RADIATION THERAPY FLDS PLACEMENT: CPT | Performed by: STUDENT IN AN ORGANIZED HEALTH CARE EDUCATION/TRAINING PROGRAM

## 2024-09-20 PROCEDURE — 77427 RADIATION TX MANAGEMENT X5: CPT | Performed by: INTERNAL MEDICINE

## 2024-09-23 ENCOUNTER — APPOINTMENT (OUTPATIENT)
Dept: RADIATION ONCOLOGY | Facility: HOSPITAL | Age: 74
End: 2024-09-23
Attending: INTERNAL MEDICINE
Payer: MEDICARE

## 2024-09-23 PROCEDURE — 77014 CHG CT GUIDANCE RADIATION THERAPY FLDS PLACEMENT: CPT | Performed by: RADIOLOGY

## 2024-09-23 PROCEDURE — 77385 HB NTSTY MODUL RAD TX DLVR SMPL: CPT | Performed by: RADIOLOGY

## 2024-09-24 ENCOUNTER — APPOINTMENT (OUTPATIENT)
Dept: RADIATION ONCOLOGY | Facility: HOSPITAL | Age: 74
End: 2024-09-24
Attending: INTERNAL MEDICINE
Payer: MEDICARE

## 2024-09-24 PROCEDURE — 77385 HB NTSTY MODUL RAD TX DLVR SMPL: CPT | Performed by: RADIOLOGY

## 2024-09-24 PROCEDURE — 77014 CHG CT GUIDANCE RADIATION THERAPY FLDS PLACEMENT: CPT | Performed by: RADIOLOGY

## 2024-09-25 ENCOUNTER — APPOINTMENT (OUTPATIENT)
Dept: RADIATION ONCOLOGY | Facility: HOSPITAL | Age: 74
End: 2024-09-25
Attending: INTERNAL MEDICINE
Payer: MEDICARE

## 2024-09-25 PROCEDURE — 77385 HB NTSTY MODUL RAD TX DLVR SMPL: CPT | Performed by: RADIOLOGY

## 2024-09-25 PROCEDURE — 77014 CHG CT GUIDANCE RADIATION THERAPY FLDS PLACEMENT: CPT | Performed by: RADIOLOGY

## 2024-09-26 ENCOUNTER — APPOINTMENT (OUTPATIENT)
Dept: RADIATION ONCOLOGY | Facility: HOSPITAL | Age: 74
End: 2024-09-26
Attending: INTERNAL MEDICINE
Payer: MEDICARE

## 2024-09-26 ENCOUNTER — TELEPHONE (OUTPATIENT)
Dept: UROLOGY | Facility: CLINIC | Age: 74
End: 2024-09-26

## 2024-09-26 DIAGNOSIS — C61 PROSTATE CANCER (HCC): Primary | ICD-10-CM

## 2024-09-26 PROCEDURE — 77014 CHG CT GUIDANCE RADIATION THERAPY FLDS PLACEMENT: CPT | Performed by: INTERNAL MEDICINE

## 2024-09-26 PROCEDURE — 77385 HB NTSTY MODUL RAD TX DLVR SMPL: CPT | Performed by: INTERNAL MEDICINE

## 2024-09-26 PROCEDURE — 77336 RADIATION PHYSICS CONSULT: CPT | Performed by: INTERNAL MEDICINE

## 2024-09-26 NOTE — TELEPHONE ENCOUNTER
----- Message from Prabha PIERSON sent at 9/26/2024 12:54 PM EDT -----  Regarding: RE: post-treatment f/u  Ezio Patel's last tx is 10/1. Per Dr. Leblanc he is hoping to have his 3- month PSA FU in late December. Can someone please contact Ezio and get this set up.       Thank you!  ----- Message -----  From: Amari Leblanc MD  Sent: 9/26/2024  12:15 PM EDT  To: Angie Burnham MA; Lisha Rausch RN  Subject: post-treatment f/u                               Gabe Adams,    I'm sure Mr. Muir is on your radar but just wanted to give a heads up that he has 3 sessions remaining, and was hoping to have his 3-month PSA and Urology f/u in late December before he and his wife head out of [place].    Can you help arrange? 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    Thanks.

## 2024-09-27 ENCOUNTER — APPOINTMENT (OUTPATIENT)
Dept: RADIATION ONCOLOGY | Facility: HOSPITAL | Age: 74
End: 2024-09-27
Attending: INTERNAL MEDICINE
Payer: MEDICARE

## 2024-09-27 PROCEDURE — 77014 CHG CT GUIDANCE RADIATION THERAPY FLDS PLACEMENT: CPT | Performed by: RADIOLOGY

## 2024-09-27 PROCEDURE — 77385 HB NTSTY MODUL RAD TX DLVR SMPL: CPT | Performed by: RADIOLOGY

## 2024-09-30 ENCOUNTER — APPOINTMENT (OUTPATIENT)
Dept: RADIATION ONCOLOGY | Facility: HOSPITAL | Age: 74
End: 2024-09-30
Attending: INTERNAL MEDICINE
Payer: MEDICARE

## 2024-09-30 PROCEDURE — 77385 HB NTSTY MODUL RAD TX DLVR SMPL: CPT | Performed by: STUDENT IN AN ORGANIZED HEALTH CARE EDUCATION/TRAINING PROGRAM

## 2024-09-30 PROCEDURE — 77014 CHG CT GUIDANCE RADIATION THERAPY FLDS PLACEMENT: CPT | Performed by: STUDENT IN AN ORGANIZED HEALTH CARE EDUCATION/TRAINING PROGRAM

## 2024-10-01 ENCOUNTER — APPOINTMENT (OUTPATIENT)
Dept: RADIATION ONCOLOGY | Facility: HOSPITAL | Age: 74
End: 2024-10-01
Attending: INTERNAL MEDICINE
Payer: MEDICARE

## 2024-10-01 PROCEDURE — 77014 CHG CT GUIDANCE RADIATION THERAPY FLDS PLACEMENT: CPT | Performed by: RADIOLOGY

## 2024-10-01 PROCEDURE — 77385 HB NTSTY MODUL RAD TX DLVR SMPL: CPT | Performed by: RADIOLOGY

## 2024-10-01 PROCEDURE — 77336 RADIATION PHYSICS CONSULT: CPT | Performed by: INTERNAL MEDICINE

## 2024-10-02 ENCOUNTER — APPOINTMENT (OUTPATIENT)
Dept: LAB | Age: 74
End: 2024-10-02
Payer: MEDICARE

## 2024-10-02 LAB
ALBUMIN SERPL BCG-MCNC: 4.1 G/DL (ref 3.5–5)
ALP SERPL-CCNC: 64 U/L (ref 34–104)
ALT SERPL W P-5'-P-CCNC: 24 U/L (ref 7–52)
ANION GAP SERPL CALCULATED.3IONS-SCNC: 7 MMOL/L (ref 4–13)
AST SERPL W P-5'-P-CCNC: 16 U/L (ref 13–39)
BILIRUB SERPL-MCNC: 0.73 MG/DL (ref 0.2–1)
BUN SERPL-MCNC: 16 MG/DL (ref 5–25)
CALCIUM SERPL-MCNC: 9.2 MG/DL (ref 8.4–10.2)
CHLORIDE SERPL-SCNC: 107 MMOL/L (ref 96–108)
CHOLEST SERPL-MCNC: 235 MG/DL
CO2 SERPL-SCNC: 28 MMOL/L (ref 21–32)
CREAT SERPL-MCNC: 0.86 MG/DL (ref 0.6–1.3)
CREAT UR-MCNC: 170.6 MG/DL
EST. AVERAGE GLUCOSE BLD GHB EST-MCNC: 100 MG/DL
GFR SERPL CREATININE-BSD FRML MDRD: 86 ML/MIN/1.73SQ M
GLUCOSE P FAST SERPL-MCNC: 96 MG/DL (ref 65–99)
HBA1C MFR BLD: 5.1 %
HCV AB SER QL: NORMAL
HDLC SERPL-MCNC: 59 MG/DL
LDLC SERPL CALC-MCNC: 144 MG/DL (ref 0–100)
MICROALBUMIN UR-MCNC: <7 MG/L
NONHDLC SERPL-MCNC: 176 MG/DL
POTASSIUM SERPL-SCNC: 5 MMOL/L (ref 3.5–5.3)
PROT SERPL-MCNC: 6.4 G/DL (ref 6.4–8.4)
SODIUM SERPL-SCNC: 142 MMOL/L (ref 135–147)
TRIGL SERPL-MCNC: 158 MG/DL

## 2024-10-02 PROCEDURE — 82043 UR ALBUMIN QUANTITATIVE: CPT

## 2024-10-02 PROCEDURE — 83036 HEMOGLOBIN GLYCOSYLATED A1C: CPT

## 2024-10-02 PROCEDURE — 80061 LIPID PANEL: CPT

## 2024-10-02 PROCEDURE — 80053 COMPREHEN METABOLIC PANEL: CPT

## 2024-10-02 PROCEDURE — 82570 ASSAY OF URINE CREATININE: CPT

## 2024-10-02 PROCEDURE — 86803 HEPATITIS C AB TEST: CPT

## 2024-10-15 ENCOUNTER — OFFICE VISIT (OUTPATIENT)
Dept: FAMILY MEDICINE CLINIC | Facility: CLINIC | Age: 74
End: 2024-10-15

## 2024-10-15 ENCOUNTER — TELEPHONE (OUTPATIENT)
Dept: RADIATION ONCOLOGY | Facility: HOSPITAL | Age: 74
End: 2024-10-15

## 2024-10-15 ENCOUNTER — TELEPHONE (OUTPATIENT)
Age: 74
End: 2024-10-15

## 2024-10-15 VITALS
HEIGHT: 71 IN | OXYGEN SATURATION: 98 % | DIASTOLIC BLOOD PRESSURE: 90 MMHG | HEART RATE: 61 BPM | WEIGHT: 210.8 LBS | TEMPERATURE: 97.8 F | BODY MASS INDEX: 29.51 KG/M2 | SYSTOLIC BLOOD PRESSURE: 124 MMHG

## 2024-10-15 DIAGNOSIS — Z00.00 MEDICARE ANNUAL WELLNESS VISIT, SUBSEQUENT: Primary | ICD-10-CM

## 2024-10-15 DIAGNOSIS — C61 PROSTATE CANCER (HCC): ICD-10-CM

## 2024-10-15 DIAGNOSIS — R73.9 HYPERGLYCEMIA: ICD-10-CM

## 2024-10-15 DIAGNOSIS — F41.1 GAD (GENERALIZED ANXIETY DISORDER): ICD-10-CM

## 2024-10-15 DIAGNOSIS — E78.5 HYPERLIPIDEMIA, UNSPECIFIED HYPERLIPIDEMIA TYPE: ICD-10-CM

## 2024-10-15 DIAGNOSIS — R03.0 ELEVATED BP WITHOUT DIAGNOSIS OF HYPERTENSION: ICD-10-CM

## 2024-10-15 DIAGNOSIS — J30.9 ALLERGIC RHINITIS, UNSPECIFIED SEASONALITY, UNSPECIFIED TRIGGER: Chronic | ICD-10-CM

## 2024-10-15 RX ORDER — LOVASTATIN 40 MG/1
40 TABLET ORAL DAILY
Qty: 90 TABLET | Refills: 3 | Status: SHIPPED | OUTPATIENT
Start: 2024-10-15

## 2024-10-15 NOTE — TELEPHONE ENCOUNTER
Advised that per Dr. Leblanc, if it's a routine colonoscopy, it's preferable to wait 6 months post RT. Advised to also discuss with GI doc, incase any concerns that 6 months is not acceptable. Pt agreed to plan and will call GI to confirm.

## 2024-10-15 NOTE — TELEPHONE ENCOUNTER
Patient calling in, stated he is due for a colonoscopy, however wanted to be sure it is okay to have one at this time as he just finished radiation 2 weeks ago. Please call him back at [phone] to let him know.

## 2024-10-15 NOTE — PROGRESS NOTES
Assessment and Plan:     Problem List Items Addressed This Visit          Respiratory    Allergic rhinitis (Chronic)     Well controlled  Remains on claritin as needed            Genitourinary    Prostate cancer (HCC)     Completed prostate radiation 2 weeks ago  Due for follow up with urology Dec. 26 2024.   Nocturia increased slightly  No bowel or bladder incontinence.  Continue to monitor            Behavioral Health    ARTEMIO (generalized anxiety disorder)     Well controlled  Tolerating lexapro 5mg  Feels good and will continue at this dose.            Other    Medicare annual wellness visit, subsequent - Primary (Chronic)    HLD (hyperlipidemia)     Lab Results   Component Value Date    LDLCALC 144 (H) 10/02/2024     Worsening  Patient reports limited physical activity and increase in diet due to his wife's health  Encouraged exercise 150minutes weekly and low fat diet  Continue lovastatin 40mg  Repeat lipid panel in 6 months         Relevant Medications    lovastatin (MEVACOR) 40 MG tablet    Other Relevant Orders    Lipid panel    Hyperglycemia     Lab Results   Component Value Date    GLUF 96 10/02/2024     Well controlled  Continue to monitor         Relevant Orders    Comprehensive metabolic panel    Elevated BP without diagnosis of hypertension     Initial bp 160's/80's  Repeat bp 124/90  Discussed low salt diet and increase in physical acitivity  Bp log handout provided  If home bp's increase to be greater than 140/90, return for discussion and treatment  Follow up as needed             Preventive health issues were discussed with patient, and age appropriate screening tests were ordered as noted in patient's After Visit Summary.  Personalized health advice and appropriate referrals for health education or preventive services given if needed, as noted in patient's After Visit Summary.     History of Present Illness:     Patient presents for a Medicare Wellness Visit and routine follow up. He completed  radiation therapy for prostate cancer.     HPI   Patient Care Team:  Joe Wylie DO as PCP - General (Family Medicine)  Judson Deluca MD (Urology)  Amari Leblanc MD (Radiation Oncology)  Angie Burnham MA as Care Coordinator (Oncology)     Review of Systems:     Review of Systems   Constitutional:  Negative for activity change, chills, diaphoresis and fever.   HENT:  Negative for ear pain, hearing loss, postnasal drip, rhinorrhea, sinus pressure, sinus pain, sneezing and sore throat.    Respiratory:  Negative for cough, chest tightness, shortness of breath and wheezing.    Cardiovascular:  Negative for chest pain, palpitations and leg swelling.   Gastrointestinal:  Negative for abdominal pain, blood in stool, constipation, diarrhea, nausea and vomiting.   Genitourinary:  Negative for dysuria, frequency, hematuria and urgency.   Musculoskeletal:  Negative for arthralgias and myalgias.   Neurological:  Negative for dizziness, syncope, weakness, light-headedness, numbness and headaches.      Problem List:     Patient Active Problem List   Diagnosis    HLD (hyperlipidemia)    Allergic rhinitis    Hyperglycemia    Medicare annual wellness visit, subsequent    COVID-19    Benign prostatic hyperplasia with lower urinary tract symptoms    Elevated PSA    ARTEMIO (generalized anxiety disorder)    Prostate cancer (HCC)    Elevated BP without diagnosis of hypertension      Past Medical and Surgical History:     Past Medical History:   Diagnosis Date    Anxiety     Dupuytren contracture     bilateral    Hyperlipidemia      Past Surgical History:   Procedure Laterality Date    COLONOSCOPY W/ POLYPECTOMY  09/23/2004    EYE SURGERY Bilateral November 2020    Cataract and laser on both eyes    HAND SURGERY Bilateral 12/2008    DUPUYTRENS CONTRACTURE SURGICAL RELEASE    INSERTION OF FIDUCIAL MARKER (TRANSRECTAL ULTRASOUND GUIDANCE) N/A 7/25/2024    Procedure: INSERTION OF FIDUCIAL MARKER ,SPACEIVIS;  Surgeon: Judson  MD Sandrine;  Location:  MAIN OR;  Service: Urology    IL BX PROSTATE STRTCTC SATURATION SAMPLING IMG GID N/A 2/23/2024    Procedure: TRANSPERINEAL ULTRASOUND GUIDED BIOPSY PROSTATE;  Surgeon: Melo Chris MD;  Location: [facility];  Service: Urology      Family History:     Family History   Problem Relation Age of Onset    Coronary artery disease Mother     Diabetes Mother     Cirrhosis Father     Diabetes type II Family         multiple relatives    Heart disease Family         ASCVD      Social History:     Social History     Socioeconomic History    Marital status: /Civil Union     Spouse name: None    Number of children: None    Years of education: None    Highest education level: None   Occupational History    None   Tobacco Use    Smoking status: Some Days     Types: Cigars     Start date: 1980     Passive exposure: Current    Smokeless tobacco: Never    Tobacco comments:     Cigar once and a while when I play golf; on average I play two or three times a week... last cigar was 2.5 weeks ago   Vaping Use    Vaping status: Never Used   Substance and Sexual Activity    Alcohol use: Yes     Alcohol/week: 7.0 standard drinks of alcohol     Types: 7 Standard drinks or equivalent per week     Comment: With dinner- drinks daily . last drink was 2/21    Drug use: No    Sexual activity: Not Currently   Other Topics Concern    None   Social History Narrative    None     Social Determinants of Health     Financial Resource Strain: Low Risk  (9/14/2023)    Overall Financial Resource Strain (CARDIA)     Difficulty of Paying Living Expenses: Not very hard   Food Insecurity: No Food Insecurity (10/8/2024)    Hunger Vital Sign     Worried About Running Out of Food in the Last Year: Never true     Ran Out of Food in the Last Year: Never true   Transportation Needs: No Transportation Needs (10/8/2024)    PRAPARE - Transportation     Lack of Transportation (Medical): No     Lack of Transportation  (Non-Medical): No   Physical Activity: Not on file   Stress: Not on file   Social Connections: Not on file   Intimate Partner Violence: Not on file   Housing Stability: Low Risk  (10/8/2024)    Housing Stability Vital Sign     Unable to Pay for Housing in the Last Year: No     Number of Times Moved in the Last Year: 0     Homeless in the Last Year: No      Medications and Allergies:     Current Outpatient Medications   Medication Sig Dispense Refill    escitalopram (LEXAPRO) 5 mg tablet TAKE 1 TABLET (5 MG TOTAL) BY MOUTH DAILY. 90 tablet 1    lovastatin (MEVACOR) 40 MG tablet Take 1 tablet (40 mg total) by mouth daily 90 tablet 3     No current facility-administered medications for this visit.     No Known Allergies   Immunizations:     Immunization History   Administered Date(s) Administered    COVID-19 MODERNA VACC 0.25 ML IM BOOSTER 11/17/2021    COVID-19 MODERNA VACC 0.5 ML IM 12/31/2020, 01/26/2021, 11/17/2021    COVID-19 Moderna Vac BIVALENT 12 Yr+ IM 0.5 ML 10/11/2022    COVID-19 Moderna mRNA Vaccine 12 Yr+ 50 mcg/0.5 mL (Spikevax) 10/24/2023    INFLUENZA 09/26/2018, 10/15/2021, 10/25/2022, 10/03/2023    Influenza Split High Dose Preservative Free IM 09/26/2018, 09/28/2019    Influenza, high dose seasonal 0.7 mL 10/13/2020, 10/15/2021    Pneumococcal Conjugate 13-Valent 08/15/2016    Pneumococcal Polysaccharide PPV23 08/23/2017    Respiratory Syncytial Virus Vaccine (Recombinant, Adjuvanted) 11/14/2023    Td (adult), Unspecified 08/14/2005    Td (adult), adsorbed 08/30/2005    Tdap 08/10/2012, 11/02/2022    Zoster 12/31/2015    Zoster Vaccine Recombinant 10/08/2019, 12/11/2019      Health Maintenance:         Topic Date Due    Colorectal Cancer Screening  12/09/2024    Hepatitis C Screening  Completed         Topic Date Due    Influenza Vaccine (1) 09/01/2024    COVID-19 Vaccine (5 - 2023-24 season) 09/01/2024      Medicare Screening Tests and Risk Assessments:     Ezio is here for his Subsequent Wellness  visit. Last Medicare Wellness visit information reviewed, patient interviewed and updates made to the record today.      Health Risk Assessment:   Patient rates overall health as very good. Patient feels that their physical health rating is same. Patient is very satisfied with their life. Eyesight was rated as same. Hearing was rated as same. Patient feels that their emotional and mental health rating is same. Patients states they are sometimes angry. Patient states they are sometimes unusually tired/fatigued. Pain experienced in the last 7 days has been none. Patient states that he has experienced no weight loss or gain in last 6 months.     Depression Screening:   PHQ-2 Score: 0      Fall Risk Screening:   In the past year, patient has experienced: no history of falling in past year      Home Safety:  Patient does not have trouble with stairs inside or outside of their home. Patient has working smoke alarms and has working carbon monoxide detector. Home safety hazards include: none.     Nutrition:   Current diet is Regular.     Medications:   Patient is not currently taking any over-the-counter supplements. Patient is not able to manage medications.     Activities of Daily Living (ADLs)/Instrumental Activities of Daily Living (IADLs):   Walk and transfer into and out of bed and chair?: Yes  Dress and groom yourself?: Yes    Bathe or shower yourself?: Yes    Feed yourself? Yes  Do your laundry/housekeeping?: Yes  Manage your money, pay your bills and track your expenses?: Yes  Make your own meals?: Yes    Do your own shopping?: Yes    Previous Hospitalizations:   Any hospitalizations or ED visits within the last 12 months?: No      Advance Care Planning:   Living will: Yes    Durable POA for healthcare: Yes    Advanced directive: Yes    Advanced directive counseling given: Yes    End of Life Decisions reviewed with patient: Yes    Provider agrees with end of life decisions: Yes      Cognitive Screening:   Provider  "or family/friend/caregiver concerned regarding cognition?: No    PREVENTIVE SCREENINGS      Cardiovascular Screening:    General: Screening Not Indicated and History Lipid Disorder      Diabetes Screening:     General: Screening Current      Colorectal Cancer Screening:     General: Screening Current      Prostate Cancer Screening:    General: History Prostate Cancer      Osteoporosis Screening:    General: Patient Declines      Abdominal Aortic Aneurysm (AAA) Screening:    Risk factors include: age between 65-74 yo and tobacco use        Lung Cancer Screening:     General: Screening Not Indicated      Hepatitis C Screening:    General: Screening Current    Screening, Brief Intervention, and Referral to Treatment (SBIRT)    Screening  Typical number of drinks in a day: 1  Typical number of drinks in a week: 5  Interpretation: Low risk drinking behavior.    AUDIT-C Screenin) How often did you have a drink containing alcohol in the past year? 2 to 3 times a week  2) How many drinks did you have on a typical day when you were drinking in the past year? 1 to 2  3) How often did you have 6 or more drinks on one occasion in the past year? never    AUDIT-C Score: 3  Interpretation: Score 0-3 (male): Negative screen for alcohol misuse    Single Item Drug Screening:  How often have you used an illegal drug (including marijuana) or a prescription medication for non-medical reasons in the past year? never    Single Item Drug Screen Score: 0  Interpretation: Negative screen for possible drug use disorder    Other Counseling Topics:   Car/seat belt/driving safety, skin self-exam, sunscreen and regular weightbearing exercise and calcium and vitamin D intake.     No results found.     Physical Exam:     /90 (BP Location: Left arm, Patient Position: Sitting, Cuff Size: Large)   Pulse 61   Temp 97.8 °F (36.6 °C) (Temporal)   Ht 5' 11.1\" (1.806 m)   Wt 95.6 kg (210 lb 12.8 oz)   SpO2 98%   BMI 29.32 kg/m² "     Physical Exam  Vitals reviewed.   Constitutional:       General: He is not in acute distress.     Appearance: He is well-developed. He is not diaphoretic.   HENT:      Head: Normocephalic and atraumatic.      Right Ear: Tympanic membrane, ear canal and external ear normal. There is no impacted cerumen.      Left Ear: Tympanic membrane, ear canal and external ear normal. There is no impacted cerumen.      Nose: Nose normal. No congestion.      Mouth/Throat:      Mouth: Mucous membranes are moist.      Pharynx: Oropharynx is clear.   Eyes:      General: No scleral icterus.        Right eye: No discharge.         Left eye: No discharge.      Conjunctiva/sclera: Conjunctivae normal.      Pupils: Pupils are equal, round, and reactive to light.   Neck:      Vascular: No JVD.   Cardiovascular:      Rate and Rhythm: Normal rate and regular rhythm.      Heart sounds: Normal heart sounds. No murmur heard.     No friction rub.   Pulmonary:      Effort: Pulmonary effort is normal. No respiratory distress.      Breath sounds: Normal breath sounds. No wheezing or rales.   Chest:      Chest wall: No tenderness.   Abdominal:      General: Bowel sounds are normal. There is no distension.      Palpations: Abdomen is soft. There is no mass.      Tenderness: There is no abdominal tenderness. There is no guarding or rebound.   Musculoskeletal:         General: No tenderness or deformity. Normal range of motion.      Cervical back: Normal range of motion and neck supple.   Skin:     General: Skin is warm and dry.      Findings: No erythema or rash.      Comments: Multiple Actinic kerotoses, patient follows with dermatology every 6 months   Neurological:      Mental Status: He is alert and oriented to person, place, and time. Mental status is at baseline.      Cranial Nerves: No cranial nerve deficit.   Psychiatric:         Mood and Affect: Mood normal.          Joe Wylie DO

## 2024-10-15 NOTE — ASSESSMENT & PLAN NOTE
Lab Results   Component Value Date    LDLCALC 144 (H) 10/02/2024     Worsening  Patient reports limited physical activity and increase in diet due to his wife's health  Encouraged exercise 150minutes weekly and low fat diet  Continue lovastatin 40mg  Repeat lipid panel in 6 months

## 2024-10-15 NOTE — ASSESSMENT & PLAN NOTE
Initial bp 160's/80's  Repeat bp 124/90  Discussed low salt diet and increase in physical acitivity  Bp log handout provided  If home bp's increase to be greater than 140/90, return for discussion and treatment  Follow up as needed

## 2024-10-15 NOTE — ASSESSMENT & PLAN NOTE
Completed prostate radiation 2 weeks ago  Due for follow up with urology Dec. 26 2024.   Nocturia increased slightly  No bowel or bladder incontinence.  Continue to monitor

## 2024-10-25 ENCOUNTER — PATIENT OUTREACH (OUTPATIENT)
Dept: HEMATOLOGY ONCOLOGY | Facility: CLINIC | Age: 74
End: 2024-10-25

## 2024-10-25 NOTE — PROGRESS NOTES
I reached out and spoke with shaji to follow up and to review for any changes in barriers to care and offer supportive services as needed.    Barriers noted previously; none.     Current barriers and interventions provided: none    Having some trouble urinating at times    I have provided my direct contact information and welcome them to contact me if their needs as discussed above change. They were appreciative for the call.

## 2024-11-14 ENCOUNTER — OFFICE VISIT (OUTPATIENT)
Dept: RADIATION ONCOLOGY | Facility: CLINIC | Age: 74
End: 2024-11-14
Attending: INTERNAL MEDICINE

## 2024-11-14 DIAGNOSIS — C61 PROSTATE CANCER (HCC): Primary | ICD-10-CM

## 2024-11-14 PROCEDURE — 99024 POSTOP FOLLOW-UP VISIT: CPT | Performed by: INTERNAL MEDICINE

## 2024-11-14 NOTE — ASSESSMENT & PLAN NOTE
Ezio Muir is a 74 y.o. male with unfavorable intermediate risk adenocarcinoma of the prostate ([id number], Rodrigo 4+3, PSA 5.94).  He completed a course of radiation therapy on 10/1/24 along with 6 months of ADT.    He presents for routine 1-1.5 month end of treatment follow-up visit. He has recovered from usual expected side effects of therapy.  Reviewed post-treatment PSA assessment  12/17/24 Urology   Return to clinic in 6 months

## 2024-11-14 NOTE — PROGRESS NOTES
Virtual Brief Visit - Radiation Oncology   Ezio Muir 1950 74 y.o. male [id number]    VIRTUAL CARE DOCUMENTATION:     1. This service was provided via Telemedicine using Other: Teams Phone      2. Parties in the room with patient during teleconsult Patient only    3. Confidentiality My office door was closed     4. Participants No one else was in the room    5. Patient acknowledged consent and understanding of privacy and security of the  Telemedicine consult. I informed the patient that I have reviewed their record in Epic and presented the opportunity for them to ask any questions regarding the visit today.  The patient agreed to participate.    6. Time spent 15 mins       Cancer Staging   Prostate cancer (HCC)  Staging form: Prostate, AJCC 8th Edition  - Clinical: Stage IIC (cT1c, cN0, cM0, PSA: 5.9, Grade Group: 3) - Signed by Amari Leblanc MD on 5/8/2024  Prostate specific antigen (PSA) range: Less than 10  Pettus score: 7  Histologic grading system: 5 grade system  Assessment & Plan  Prostate cancer (HCC)  Ezio Muir is a 74 y.o. 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  He completed a course of radiation therapy on 10/1/24 along with 6 months of ADT.    He presents for routine 1-1.5 month end of treatment follow-up visit. He has recovered from usual expected side effects of therapy.  Reviewed post-treatment PSA assessment  12/17/24 Urology   Return to clinic in 6 months    Amari Leblanc MD  Department of Radiation Oncology  [facility]    No orders of the defined types were placed in this encounter.     Total Time Spent  15 minutes spent reviewing EMR in preparation for visit, with the patient, coordination with other providers, and documentation.    History of Present Illness   Interval History:  Patient tolerated RT without significant acute toxicty.  He had slightly increased nocturia with slight  dysuria.  He had intermittent diarrhea but this is improved.    He feels largely back to normal. Nocturia 0-1x. Bowel movements back to normal. He had some fatigue. Getting back to exercise, playing golf.     Historical Information   Oncology History   Prostate cancer (HCC)   2/23/2024 Biopsy    Final Diagnosis   A. Prostate, LEFT ANTERIOR MEDIAL, biopsy:  - Benign prostatic tissue, negative for malignancy      B. Prostate, LEFT ANTERIOR LATERAL, biopsy:  - Prostatic adenocarcinoma, very small focus of poorly formed glands in 1 of 3 cores (cannot be graded) (see note)  - Focal high-grade prostatic intraepithelial neoplasia (HG-PIN)      C. Prostate, LEFT POSTERIOR MEDIAL, biopsy:  - Prostatic adenocarcinoma, involving 2 of 2 cores:              - Rodrigo score 3 + 4 = 7, Pattern 4 = 25%, Prognostic Grade Group 2 continuously involving 5% of each core respectively (see note)              - Negative for periprostatic fat invasion, lymph-vascular invasion and perineural invasion       D. Prostate, LEFT POSTERIOR LATERAL, biopsy:  - Benign prostatic tissue, negative for malignancy       E. Prostate, LEFT BASE, biopsy:  - Prostatic adenocarcinoma, involving 1 of 2 cores:              - Rodrigo score 4 + 3 = 7, Pattern 4 = 60%, Prognostic Grade Group 3 continuously involving 20% of core (see note)              - Negative for periprostatic fat invasion, lymph-vascular invasion and perineural invasion       F. Prostate, RIGHT ANTERIOR MEDIAL, biopsy:  - Benign prostatic tissue, negative for malignancy       G. Prostate, RIGHT ANTERIOR LATERAL, biopsy:  - Benign prostatic tissue, negative for malignancy       H. Prostate, RIGHT POSTERIOR LATERAL, biopsy:  - High-grade prostatic intraepithelial neoplasia (HG-PIN)       I. Prostate, RIGHT POSTERIOR MEDIAL, biopsy:  - Prostatic adenocarcinoma, involving 2 of 2 cores:              - Bagdad score 3 + 4 = 7, Pattern 4 = 15%, Prognostic Grade Group 2 discontinuously involving 40%  and 15% of each core respectively (see note)              - Perineural invasion in 1 of 2 involved cores              - Negative for periprostatic fat invasion and lymph-vascular invasion        J. Prostate, RIGHT BASE, biopsy:  - Benign prostatic tissue, negative for malignancy            4/30/2024 Initial Diagnosis    Prostate cancer (HCC)     5/8/2024 -  Cancer Staged    Staging form: Prostate, AJCC 8th Edition  - Clinical: Stage IIC (cT1c, cN0, cM0, PSA: 5.9, Grade Group: 3) - Signed by Amari Leblanc MD on 5/8/2024  Prostate specific antigen (PSA) range: Less than 10  Rodrigo score: 7  Histologic grading system: 5 grade system       6/6/2024 -  Hormone Therapy    Lupron 45 mg     8/22/2024 - 10/1/2024 Radiation    Treatments:  Course: C1    Plan ID Energy Fractions Dose per Fraction (cGy) Dose Correction (cGy) Total Dose Delivered (cGy) Elapsed Days   PROSTATE PSV 10X 28 / 28 250 0 7,000 40      Treatment Dates:  8/22/2024 - 10/1/2024.          Past Medical History:   Diagnosis Date    Anxiety     Dupuytren contracture     bilateral    Hyperlipidemia      Past Surgical History:   Procedure Laterality Date    COLONOSCOPY W/ POLYPECTOMY  09/23/2004    EYE SURGERY Bilateral November 2020    Cataract and laser on both eyes    HAND SURGERY Bilateral 12/2008    DUPUYTRENS CONTRACTURE SURGICAL RELEASE    INSERTION OF FIDUCIAL MARKER (TRANSRECTAL ULTRASOUND GUIDANCE) N/A 7/25/2024    Procedure: INSERTION OF FIDUCIAL MARKER ,SPACEOAR;  Surgeon: Judson Deluca MD;  Location:  MAIN OR;  Service: Urology    OK BX PROSTATE STRTCTC SATURATION SAMPLING IMG GID N/A 2/23/2024    Procedure: TRANSPERINEAL ULTRASOUND GUIDED BIOPSY PROSTATE;  Surgeon: Melo Chris MD;  Location: [facility];  Service: Urology     Social History   Social History     Substance and Sexual Activity   Alcohol Use Yes    Alcohol/week: 7.0 standard drinks of alcohol    Types: 7 Standard drinks or equivalent per week    Comment: With dinner-  "drinks daily . last drink was 2/21     Social History     Substance and Sexual Activity   Drug Use No     Social History     Tobacco Use   Smoking Status Some Days    Types: Cigars    Start date: 1980    Passive exposure: Current   Smokeless Tobacco Never   Tobacco Comments    Cigar once and a while when I play golf; on average I play two or three times a week... last cigar was 2.5 weeks ago     Meds/Allergies     Current Outpatient Medications:     escitalopram (LEXAPRO) 5 mg tablet, TAKE 1 TABLET (5 MG TOTAL) BY MOUTH DAILY., Disp: 90 tablet, Rfl: 1    lovastatin (MEVACOR) 40 MG tablet, Take 1 tablet (40 mg total) by mouth daily, Disp: 90 tablet, Rfl: 3  No Known Allergies    OBJECTIVE:   Physical exam limited over telephone encounter.    Amari Leblanc MD  11/14/2024,3:03 PM    VIRTUAL VISIT DISCLAIMER  Patient verbally agrees to participate in Virtual Care Services. Pt is aware that Virtual Care Services could be limited without vital signs or the ability to perform a full hands-on physical exam. Patient understands that the patient or the provider may request at any time to terminate the video visit and request the patient to seek care or treatment in person.    Portions of the record may have been created with voice recognition software.  Occasional wrong word or \"sound a like\" substitutions may have occurred due to the inherent limitations of voice recognition software.  Read the chart carefully and recognize, using context, where substitutions have occurred.  "

## 2024-12-09 ENCOUNTER — APPOINTMENT (OUTPATIENT)
Dept: LAB | Age: 74
End: 2024-12-09
Payer: MEDICARE

## 2024-12-09 DIAGNOSIS — C61 PROSTATE CANCER (HCC): ICD-10-CM

## 2024-12-09 LAB — PSA SERPL-MCNC: 0.07 NG/ML (ref 0–4)

## 2024-12-09 PROCEDURE — 84153 ASSAY OF PSA TOTAL: CPT

## 2024-12-09 PROCEDURE — 36415 COLL VENOUS BLD VENIPUNCTURE: CPT

## 2024-12-17 ENCOUNTER — OFFICE VISIT (OUTPATIENT)
Dept: UROLOGY | Facility: CLINIC | Age: 74
End: 2024-12-17
Payer: MEDICARE

## 2024-12-17 VITALS
HEART RATE: 77 BPM | SYSTOLIC BLOOD PRESSURE: 136 MMHG | HEIGHT: 71 IN | DIASTOLIC BLOOD PRESSURE: 80 MMHG | BODY MASS INDEX: 29.26 KG/M2 | OXYGEN SATURATION: 98 % | WEIGHT: 209 LBS

## 2024-12-17 DIAGNOSIS — C61 PROSTATE CANCER (HCC): Primary | ICD-10-CM

## 2024-12-17 PROCEDURE — 99213 OFFICE O/P EST LOW 20 MIN: CPT

## 2024-12-17 NOTE — PROGRESS NOTES
12/17/2024      No chief complaint on file.        Assessment and Plan    74 y.o. male managed by Dr. Beasley    Grade Group 3 Prostate cancer  -Date of diagnosis February 2024 (transperineal biopsy)  -Prebiopsy MRI September 2023 no radiographically detectable disease  -Pathology reveals [place] 4+3 equal 7 disease in 1 core and 4 additional positive cores of 3+4 equal 7  -Pretreatment PSA is 4.76 (10/19/23). PSA now 0.075 (12/9/24)  -Completed radiation therapy on 10/1/24 along with 6 months of ADT.  -Patient following with rad onc.  -Patient will return for follow-up in 6 months with PSA PTV.        History of Present Illness  Ezio Muir is a 74 y.o. male here with history of Grade group 3 prostate cancer presenting today for follow-up. He is doing well. States occasional fatigue. No overall voiding complaints. Nocturia 1x per night. His PSA is now 0.075 (12/9/24).         Review of Systems   Constitutional:  Negative for activity change, chills, fatigue and fever.   HENT:  Negative for congestion, rhinorrhea and sore throat.    Eyes:  Negative for photophobia, redness and visual disturbance.   Respiratory:  Negative for cough, shortness of breath and wheezing.    Cardiovascular:  Negative for chest pain, palpitations and leg swelling.   Gastrointestinal:  Negative for abdominal pain, diarrhea, nausea and vomiting.   Genitourinary:  Negative for difficulty urinating, dysuria, flank pain, frequency, hematuria and urgency.   Neurological:  Negative for weakness, light-headedness and headaches.           AUA SYMPTOM SCORE      Flowsheet Row Most Recent Value   AUA SYMPTOM SCORE    How often have you had a sensation of not emptying your bladder completely after you finished urinating? 1 (P)     How often have you had to urinate again less than two hours after you finished urinating? 2 (P)     How often have you found you stopped and started again several times when you urinate? 1 (P)     How often have you  "found it difficult to postpone urination? 2 (P)     How often have you had a weak urinary stream? 1 (P)     How often have you had to push or strain to begin urination? 1 (P)     How many times did you most typically get up to urinate from the time you went to bed at night until the time you got up in the morning? 1 (P)     Quality of Life: If you were to spend the rest of your life with your urinary condition just the way it is now, how would you feel about that? 2 (P)     AUA SYMPTOM SCORE 9 (P)               Vitals  Vitals:    12/17/24 1407   BP: 136/80   BP Location: Left arm   Patient Position: Sitting   Cuff Size: Standard   Pulse: 77   SpO2: 98%   Weight: 94.8 kg (209 lb)   Height: 5' 11\" (1.803 m)       Physical Exam  Constitutional:       Appearance: Normal appearance. He is not toxic-appearing.   HENT:      Head: Normocephalic.      Mouth/Throat:      Pharynx: Oropharynx is clear.   Eyes:      Extraocular Movements: Extraocular movements intact.      Pupils: Pupils are equal, round, and reactive to light.   Pulmonary:      Effort: Pulmonary effort is normal. No respiratory distress.   Musculoskeletal:         General: Normal range of motion.      Cervical back: Normal range of motion.   Neurological:      Mental Status: He is alert and oriented to person, place, and time. Mental status is at baseline.      Gait: Gait normal.   Psychiatric:         Mood and Affect: Mood normal.         Behavior: Behavior normal.         Thought Content: Thought content normal.         Judgment: Judgment normal.           Past History  Past Medical History:   Diagnosis Date    Anxiety     Dupuytren contracture     bilateral    Hyperlipidemia      Social History     Socioeconomic History    Marital status: /Civil Union     Spouse name: None    Number of children: None    Years of education: None    Highest education level: None   Occupational History    None   Tobacco Use    Smoking status: Some Days     Types: Cigars "     Start date: 1980     Passive exposure: Current    Smokeless tobacco: Never    Tobacco comments:     Cigar once and a while when I play golf; on average I play two or three times a week... last cigar was 2.5 weeks ago   Vaping Use    Vaping status: Never Used   Substance and Sexual Activity    Alcohol use: Yes     Alcohol/week: 7.0 standard drinks of alcohol     Types: 7 Standard drinks or equivalent per week     Comment: With dinner- drinks daily . last drink was 2/21    Drug use: No    Sexual activity: Not Currently   Other Topics Concern    None   Social History Narrative    None     Social Drivers of Health     Financial Resource Strain: Low Risk  (9/14/2023)    Overall Financial Resource Strain (CARDIA)     Difficulty of Paying Living Expenses: Not very hard   Food Insecurity: No Food Insecurity (10/8/2024)    Nursing - Inadequate Food Risk Classification     Worried About Running Out of Food in the Last Year: Never true     Ran Out of Food in the Last Year: Never true     Ran Out of Food in the Last Year: Not on file   Transportation Needs: No Transportation Needs (10/8/2024)    PRAPARE - Transportation     Lack of Transportation (Medical): No     Lack of Transportation (Non-Medical): No   Physical Activity: Not on file   Stress: Not on file   Social Connections: Not on file   Intimate Partner Violence: Not on file   Housing Stability: Low Risk  (10/8/2024)    Housing Stability Vital Sign     Unable to Pay for Housing in the Last Year: No     Number of Times Moved in the Last Year: 0     Homeless in the Last Year: No     Social History     Tobacco Use   Smoking Status Some Days    Types: Cigars    Start date: 1980    Passive exposure: Current   Smokeless Tobacco Never   Tobacco Comments    Cigar once and a while when I play golf; on average I play two or three times a week... last cigar was 2.5 weeks ago     Family History   Problem Relation Age of Onset    Coronary artery disease Mother     Diabetes Mother      Cirrhosis Father     Diabetes type II Family         multiple relatives    Heart disease Family         ASCVD       The following portions of the patient's history were reviewed and updated as appropriate: allergies, current medications, past medical history, past social history, past surgical history and problem list.    Results  No results found for this or any previous visit (from the past hour).]  Lab Results   Component Value Date    PSA 0.075 12/09/2024    PSA 4.76 (H) 10/19/2023    PSA 5.9 (H) 08/09/2023    PSA 4.2 (H) 05/11/2023     Lab Results   Component Value Date    CALCIUM 9.2 10/02/2024    K 5.0 10/02/2024    CO2 28 10/02/2024     10/02/2024    BUN 16 10/02/2024    CREATININE 0.86 10/02/2024     Lab Results   Component Value Date    WBC 5.88 07/15/2024    HGB 15.3 07/15/2024    HCT 44.1 07/15/2024    MCV 93 07/15/2024     07/15/2024       SUSHIL Isaac

## 2025-01-10 DIAGNOSIS — F41.1 GAD (GENERALIZED ANXIETY DISORDER): ICD-10-CM

## 2025-01-10 RX ORDER — ESCITALOPRAM OXALATE 5 MG/1
5 TABLET ORAL DAILY
Qty: 90 TABLET | Refills: 0 | Status: SHIPPED | OUTPATIENT
Start: 2025-01-10

## 2025-01-10 NOTE — TELEPHONE ENCOUNTER
Reason for call:   [x] Refill   [] Prior Auth  [] Other:     Office:   [x] PCP/Provider -   [] Specialty/Provider - Joe Wylie     Medication: escitalopram (LEXAPRO) 5 mg     Dose/Frequency: TAKE 1 TABLET (5 MG TOTAL) BY MOUTH DAILY.,     Quantity: 90    Pharmacy: [facility]    Does the patient have enough for 3 days?   [x] Yes   [] No - Send as HP to POD    
no

## 2025-04-28 ENCOUNTER — TELEPHONE (OUTPATIENT)
Age: 75
End: 2025-04-28

## 2025-04-28 DIAGNOSIS — C61 PROSTATE CANCER (HCC): Primary | ICD-10-CM

## 2025-05-01 ENCOUNTER — APPOINTMENT (OUTPATIENT)
Dept: LAB | Age: 75
End: 2025-05-01
Payer: MEDICARE

## 2025-05-01 DIAGNOSIS — E78.5 HYPERLIPIDEMIA, UNSPECIFIED HYPERLIPIDEMIA TYPE: ICD-10-CM

## 2025-05-01 DIAGNOSIS — R73.9 HYPERGLYCEMIA: ICD-10-CM

## 2025-05-01 DIAGNOSIS — C61 PROSTATE CANCER (HCC): ICD-10-CM

## 2025-05-01 LAB
ALBUMIN SERPL BCG-MCNC: 4.4 G/DL (ref 3.5–5)
ALP SERPL-CCNC: 69 U/L (ref 34–104)
ALT SERPL W P-5'-P-CCNC: 15 U/L (ref 7–52)
ANION GAP SERPL CALCULATED.3IONS-SCNC: 11 MMOL/L (ref 4–13)
AST SERPL W P-5'-P-CCNC: 13 U/L (ref 13–39)
BILIRUB SERPL-MCNC: 0.76 MG/DL (ref 0.2–1)
BUN SERPL-MCNC: 18 MG/DL (ref 5–25)
CALCIUM SERPL-MCNC: 9.3 MG/DL (ref 8.4–10.2)
CHLORIDE SERPL-SCNC: 109 MMOL/L (ref 96–108)
CHOLEST SERPL-MCNC: 206 MG/DL (ref ?–200)
CO2 SERPL-SCNC: 24 MMOL/L (ref 21–32)
CREAT SERPL-MCNC: 0.93 MG/DL (ref 0.6–1.3)
GFR SERPL CREATININE-BSD FRML MDRD: 81 ML/MIN/1.73SQ M
GLUCOSE P FAST SERPL-MCNC: 92 MG/DL (ref 65–99)
HDLC SERPL-MCNC: 51 MG/DL
LDLC SERPL CALC-MCNC: 131 MG/DL (ref 0–100)
NONHDLC SERPL-MCNC: 155 MG/DL
POTASSIUM SERPL-SCNC: 3.9 MMOL/L (ref 3.5–5.3)
PROT SERPL-MCNC: 6.6 G/DL (ref 6.4–8.4)
PSA SERPL-MCNC: 0.45 NG/ML (ref 0–4)
SODIUM SERPL-SCNC: 144 MMOL/L (ref 135–147)
TRIGL SERPL-MCNC: 119 MG/DL (ref ?–150)

## 2025-05-01 PROCEDURE — 36415 COLL VENOUS BLD VENIPUNCTURE: CPT

## 2025-05-01 PROCEDURE — 80061 LIPID PANEL: CPT

## 2025-05-01 PROCEDURE — 80053 COMPREHEN METABOLIC PANEL: CPT

## 2025-05-01 PROCEDURE — 84153 ASSAY OF PSA TOTAL: CPT

## 2025-05-03 DIAGNOSIS — F41.1 GAD (GENERALIZED ANXIETY DISORDER): ICD-10-CM

## 2025-05-05 RX ORDER — ESCITALOPRAM OXALATE 5 MG/1
5 TABLET ORAL DAILY
Qty: 90 TABLET | Refills: 0 | Status: SHIPPED | OUTPATIENT
Start: 2025-05-05

## 2025-05-06 ENCOUNTER — OFFICE VISIT (OUTPATIENT)
Dept: RADIATION ONCOLOGY | Facility: HOSPITAL | Age: 75
End: 2025-05-06
Attending: INTERNAL MEDICINE
Payer: MEDICARE

## 2025-05-06 VITALS
HEART RATE: 90 BPM | OXYGEN SATURATION: 97 % | DIASTOLIC BLOOD PRESSURE: 94 MMHG | TEMPERATURE: 97.7 F | WEIGHT: 207.8 LBS | SYSTOLIC BLOOD PRESSURE: 160 MMHG | BODY MASS INDEX: 28.98 KG/M2

## 2025-05-06 DIAGNOSIS — C61 PROSTATE CANCER (HCC): Primary | ICD-10-CM

## 2025-05-06 PROCEDURE — 99211 OFF/OP EST MAY X REQ PHY/QHP: CPT | Performed by: INTERNAL MEDICINE

## 2025-05-06 PROCEDURE — 99213 OFFICE O/P EST LOW 20 MIN: CPT | Performed by: INTERNAL MEDICINE

## 2025-05-06 NOTE — ASSESSMENT & PLAN NOTE
Ezio Muir is a 74 y.o. male with unfavorable intermediate risk adenocarcinoma of the prostate ([id number], [place] 4+3, PSA 5.94).  He completed a course of radiation therapy on 10/1/24 along with 6 months of ADT (on 6/6/24)     He presents for routine follow-up visit now 7 months after completion of radiation therapy. He has no developing late toxicity related to radiation therapy. His PSA remains low at 0.451 (rise from 0.075 likely reflects wearing off of ADT)  Continue PSA surveillance.  6/17/25 Urology  RTC in 1 year or sooner if needed

## 2025-05-06 NOTE — PROGRESS NOTES
Ezio Muir 1950 is a 74 y.o. male with unfavorable intermediate risk adenocarcinoma of the prostate ([id number], [place] 4+3, PSA 5.94). MRI on 23 showed a 50cc gland and was PIRADS2 without a dominant lesion. PSMA PET/CT on 3/21/24 showed hypermetabolic in the left aspect of the prostate gland consistent with primary neoplasm, without evidence of regional or distant disease. MS[facility] nomogram-estimated risk of LN involvement 7% and SVI 5%.  He completed a course of radiation therapy on 10/1/24 along with 6  months of ADT. He was last seen 24. He presents today for routine 6 month follow-up.    Lab Results   Component Value Date    PSA 0.451 2025    PSA 0.075 2024    PSA 4.76 (H) 10/19/2023       12/17/24 Urology - SUSHIL Nagy  He is doing well. States occasional fatigue. No overall voiding complaints. Nocturia 1x per night. His PSA is now 0.075   Patient following with rad onc.  Patient will return for follow-up in 6 months with PSA PTV.      Upcomin25 Urology    Follow up visit     Oncology History   Prostate cancer (HCC)   2024 Biopsy    Final Diagnosis   A. Prostate, LEFT ANTERIOR MEDIAL, biopsy:  - Benign prostatic tissue, negative for malignancy      B. Prostate, LEFT ANTERIOR LATERAL, biopsy:  - Prostatic adenocarcinoma, very small focus of poorly formed glands in 1 of 3 cores (cannot be graded) (see note)  - Focal high-grade prostatic intraepithelial neoplasia (HG-PIN)      C. Prostate, LEFT POSTERIOR MEDIAL, biopsy:  - Prostatic adenocarcinoma, involving 2 of 2 cores:              - Rodrigo score 3 + 4 = 7, Pattern 4 = 25%, Prognostic Grade Group 2 continuously involving 5% of each core respectively (see note)              - Negative for periprostatic fat invasion, lymph-vascular invasion and perineural invasion       D. Prostate, LEFT POSTERIOR LATERAL, biopsy:  - Benign prostatic tissue, negative for malignancy       E. Prostate, LEFT BASE, biopsy:  - Prostatic  adenocarcinoma, involving 1 of 2 cores:              - Rodrigo score 4 + 3 = 7, Pattern 4 = 60%, Prognostic Grade Group 3 continuously involving 20% of core (see note)              - Negative for periprostatic fat invasion, lymph-vascular invasion and perineural invasion       F. Prostate, RIGHT ANTERIOR MEDIAL, biopsy:  - Benign prostatic tissue, negative for malignancy       G. Prostate, RIGHT ANTERIOR LATERAL, biopsy:  - Benign prostatic tissue, negative for malignancy       H. Prostate, RIGHT POSTERIOR LATERAL, biopsy:  - High-grade prostatic intraepithelial neoplasia (HG-PIN)       I. Prostate, RIGHT POSTERIOR MEDIAL, biopsy:  - Prostatic adenocarcinoma, involving 2 of 2 cores:              - [place] score 3 + 4 = 7, Pattern 4 = 15%, Prognostic Grade Group 2 discontinuously involving 40% and 15% of each core respectively (see note)              - Perineural invasion in 1 of 2 involved cores              - Negative for periprostatic fat invasion and lymph-vascular invasion        J. Prostate, RIGHT BASE, biopsy:  - Benign prostatic tissue, negative for malignancy            4/30/2024 Initial Diagnosis    Prostate cancer (HCC)     5/8/2024 -  Cancer Staged    Staging form: Prostate, AJCC 8th Edition  - Clinical: Stage IIC (cT1c, cN0, cM0, PSA: 5.9, Grade Group: 3) - Signed by Amari Leblanc MD on 5/8/2024  Prostate specific antigen (PSA) range: Less than 10  Rodrigo score: 7  Histologic grading system: 5 grade system       6/6/2024 -  Hormone Therapy    Lupron 45 mg     8/22/2024 - 10/1/2024 Radiation    Treatments:  Course: C1    Plan ID Energy Fractions Dose per Fraction (cGy) Dose Correction (cGy) Total Dose Delivered (cGy) Elapsed Days   PROSTATE PSV 10X 28 / 28 250 0 7,000 40      Treatment Dates:  8/22/2024 - 10/1/2024.            Review of Systems:  Review of Systems   Gastrointestinal: Negative.  Negative for diarrhea.   Endocrine: Negative for heat intolerance.   Genitourinary:  Negative for dysuria,  hematuria and urgency.        Nocturia x1   Musculoskeletal:  Positive for arthralgias (knees.).   Allergic/Immunologic: Negative.        Clinical Trial: no    IPSS Questionnaire (AUA-7):  Over the past month…    1)  How often have you had a sensation of not emptying your bladder completely after you finish urinating?  0 - Not at all   2)  How often have you had to urinate again less than two hours after you finished urinating? 1 - Less than 1 time in 5   3)  How often have you found you stopped and started again several times when you urinated?  0 - Not at all   4) How difficult have you found it to postpone urination?  5 - Almost always   5) How often have you had a weak urinary stream?  0 - Not at all   6) How often have you had to push or strain to begin urination?  1 - Less than 1 time in 5   7) How many times did you most typically get up to urinate from the time you went to bed until the time you got up in the morning?  1 - 1 time   Total Score:  8       Teaching completed    Health Maintenance   Topic Date Due    BMI: Followup Plan  10/16/2024    Colorectal Cancer Screening  12/09/2024    COVID-19 Vaccine (6 - Moderna risk 2024-25 season) 04/17/2025    Depression Screening  10/15/2025    Fall Risk  10/15/2025    Medicare Annual Wellness Visit (AWV)  10/15/2025    BMI: Adult  12/17/2025    Hepatitis C Screening  Completed    RSV Vaccine for Pregnant Patients and Patients Age 60+ Years  Completed    Zoster Vaccine  Completed    Pneumococcal Vaccine: 65+ Years  Completed    Influenza Vaccine  Completed    Meningococcal B Vaccine  Aged Out    RSV Vaccine age 0-20 Months  Aged Out    HIB Vaccine  Aged Out    IPV Vaccine  Aged Out    Hepatitis A Vaccine  Aged Out    Meningococcal ACWY Vaccine  Aged Out    HPV Vaccine  Aged Out     Patient Active Problem List   Diagnosis    HLD (hyperlipidemia)    Allergic rhinitis    Hyperglycemia    Medicare annual wellness visit, subsequent    COVID-19    Benign prostatic  hyperplasia with lower urinary tract symptoms    Elevated PSA    ARTEMIO (generalized anxiety disorder)    Prostate cancer (HCC)    Elevated BP without diagnosis of hypertension     Past Medical History:   Diagnosis Date    Anxiety     Dupuytren contracture     bilateral    Hyperlipidemia      Past Surgical History:   Procedure Laterality Date    COLONOSCOPY W/ POLYPECTOMY  09/23/2004    EYE SURGERY Bilateral November 2020    Cataract and laser on both eyes    HAND SURGERY Bilateral 12/2008    DUPUYTRENS CONTRACTURE SURGICAL RELEASE    INSERTION OF FIDUCIAL MARKER (TRANSRECTAL ULTRASOUND GUIDANCE) N/A 7/25/2024    Procedure: INSERTION OF FIDUCIAL MARKER ,SPACEOAR;  Surgeon: Judson Deluca MD;  Location:  MAIN OR;  Service: Urology    IN BX PROSTATE STRTCTC SATURATION SAMPLING IMG GID N/A 2/23/2024    Procedure: TRANSPERINEAL ULTRASOUND GUIDED BIOPSY PROSTATE;  Surgeon: Melo Chris MD;  Location: [facility];  Service: Urology     Family History   Problem Relation Age of Onset    Coronary artery disease Mother     Diabetes Mother     Cirrhosis Father     Diabetes type II Family         multiple relatives    Heart disease Family         ASCVD     Social History     Socioeconomic History    Marital status: /Civil Union     Spouse name: Not on file    Number of children: Not on file    Years of education: Not on file    Highest education level: Not on file   Occupational History    Not on file   Tobacco Use    Smoking status: Some Days     Types: Cigars     Start date: 1980     Passive exposure: Current    Smokeless tobacco: Never    Tobacco comments:     Cigar once and a while when I play golf; on average I play two or three times a week... last cigar was 2.5 weeks ago   Vaping Use    Vaping status: Never Used   Substance and Sexual Activity    Alcohol use: Yes     Alcohol/week: 7.0 standard drinks of alcohol     Types: 7 Standard drinks or equivalent per week     Comment: With dinner- drinks daily .  last drink was 2/21    Drug use: No    Sexual activity: Not Currently   Other Topics Concern    Not on file   Social History Narrative    Not on file     Social Drivers of Health     Financial Resource Strain: Low Risk  (9/14/2023)    Overall Financial Resource Strain (CARDIA)     Difficulty of Paying Living Expenses: Not very hard   Food Insecurity: No Food Insecurity (10/8/2024)    Nursing - Inadequate Food Risk Classification     Worried About Running Out of Food in the Last Year: Never true     Ran Out of Food in the Last Year: Never true     Ran Out of Food in the Last Year: Not on file   Transportation Needs: No Transportation Needs (10/8/2024)    PRAPARE - Transportation     Lack of Transportation (Medical): No     Lack of Transportation (Non-Medical): No   Physical Activity: Not on file   Stress: Not on file   Social Connections: Not on file   Intimate Partner Violence: Not on file   Housing Stability: Low Risk  (10/8/2024)    Housing Stability Vital Sign     Unable to Pay for Housing in the Last Year: No     Number of Times Moved in the Last Year: 0     Homeless in the Last Year: No       Current Outpatient Medications:     escitalopram (LEXAPRO) 5 mg tablet, Take 1 tablet (5 mg total) by mouth daily, Disp: 90 tablet, Rfl: 0    lovastatin (MEVACOR) 40 MG tablet, Take 1 tablet (40 mg total) by mouth daily, Disp: 90 tablet, Rfl: 3  No Known Allergies  There were no vitals filed for this visit.

## 2025-05-06 NOTE — PROGRESS NOTES
Follow-up Visit   Name: Ezio Muir      : 1950      MRN: [id number]  Encounter Provider: Amari Leblanc MD  Encounter Date: 2025   Encounter department: [facility] RADIATION ONCOLOG[facility]  :  Assessment & Plan  Prostate cancer (HCC)  Ezio Muir is a 74 y.o. male with unfavorable intermediate risk adenocarcinoma of the prostate ([id number], [place] 4+3, PSA 5.94).  He completed a course of radiation therapy on 10/1/24 along with 6 months of ADT (on 24)     He presents for routine follow-up visit now 7 months after completion of radiation therapy. He has no developing late toxicity related to radiation therapy. His PSA remains low at 0.451 (rise from 0.075 likely reflects wearing off of ADT)  Continue PSA surveillance.  25 Urology  RTC in 1 year or sooner if needed              History of Present Illness   Chief Complaint   Patient presents with   • Follow-up   Pertinent Medical History   Ezio Muir is a 74 y.o. male with unfavorable intermediate risk adenocarcinoma of the prostate ([id number], [place] 4+3, PSA 5.94). MRI on 23 showed a 50cc gland and was PIRADS2 without a dominant lesion. PSMA PET/CT on 3/21/24 showed hypermetabolic in the left aspect of the prostate gland consistent with primary neoplasm, without evidence of regional or distant disease.  He completed a course of radiation therapy on 10/1/24 along with 6  months of ADT. He was last seen 24. He presents today for routine 6 month follow-up.    Lab Results   Component Value Date    PSA 0.451 2025    PSA 0.075 2024    PSA 4.76 (H) 10/19/2023       12/17/24 Urology - SUSHIL Nagy  He is doing well. States occasional fatigue. No overall voiding complaints. Nocturia 1x per night. His PSA is now 0.075   Patient following with rad onc.  Patient will return for follow-up in 6 months with PSA PTV.    Upcomin25 Urology    Today he reports feeling well overall, without significant  side effects related to treatment.      Oncology History   Cancer Staging   Prostate cancer (HCC)  Staging form: Prostate, AJCC 8th Edition  - Clinical: Stage IIC (cT1c, cN0, cM0, PSA: 5.9, Grade Group: 3) - Signed by Amari Leblanc MD on 5/8/2024  Prostate specific antigen (PSA) range: Less than 10  Rodrigo score: 7  Histologic grading system: 5 grade system  Oncology History   Prostate cancer (HCC)   2/23/2024 Biopsy    Final Diagnosis   A. Prostate, LEFT ANTERIOR MEDIAL, biopsy:  - Benign prostatic tissue, negative for malignancy      B. Prostate, LEFT ANTERIOR LATERAL, biopsy:  - Prostatic adenocarcinoma, very small focus of poorly formed glands in 1 of 3 cores (cannot be graded) (see note)  - Focal high-grade prostatic intraepithelial neoplasia (HG-PIN)      C. Prostate, LEFT POSTERIOR MEDIAL, biopsy:  - Prostatic adenocarcinoma, involving 2 of 2 cores:              - [place] score 3 + 4 = 7, Pattern 4 = 25%, Prognostic Grade Group 2 continuously involving 5% of each core respectively (see note)              - Negative for periprostatic fat invasion, lymph-vascular invasion and perineural invasion       D. Prostate, LEFT POSTERIOR LATERAL, biopsy:  - Benign prostatic tissue, negative for malignancy       E. Prostate, LEFT BASE, biopsy:  - Prostatic adenocarcinoma, involving 1 of 2 cores:              - [place] score 4 + 3 = 7, Pattern 4 = 60%, Prognostic Grade Group 3 continuously involving 20% of core (see note)              - Negative for periprostatic fat invasion, lymph-vascular invasion and perineural invasion       F. Prostate, RIGHT ANTERIOR MEDIAL, biopsy:  - Benign prostatic tissue, negative for malignancy       G. Prostate, RIGHT ANTERIOR LATERAL, biopsy:  - Benign prostatic tissue, negative for malignancy       H. Prostate, RIGHT POSTERIOR LATERAL, biopsy:  - High-grade prostatic intraepithelial neoplasia (HG-PIN)       I. Prostate, RIGHT POSTERIOR MEDIAL, biopsy:  - Prostatic adenocarcinoma,  involving 2 of 2 cores:              - Rodrigo score 3 + 4 = 7, Pattern 4 = 15%, Prognostic Grade Group 2 discontinuously involving 40% and 15% of each core respectively (see note)              - Perineural invasion in 1 of 2 involved cores              - Negative for periprostatic fat invasion and lymph-vascular invasion        J. Prostate, RIGHT BASE, biopsy:  - Benign prostatic tissue, negative for malignancy          4/30/2024 Initial Diagnosis    Prostate cancer (HCC)     5/8/2024 -  Cancer Staged    Staging form: Prostate, AJCC 8th Edition  - Clinical: Stage IIC (cT1c, cN0, cM0, PSA: 5.9, Grade Group: 3) - Signed by Amari Leblanc MD on 5/8/2024  Prostate specific antigen (PSA) range: Less than 10  Rodrigo score: 7  Histologic grading system: 5 grade system       6/6/2024 -  Hormone Therapy    Lupron 45 mg     8/22/2024 - 10/1/2024 Radiation    Treatments:  Course: C1    Plan ID Energy Fractions Dose per Fraction (cGy) Dose Correction (cGy) Total Dose Delivered (cGy) Elapsed Days   PROSTATE PSV 10X 28 / 28 250 0 7,000 40      Treatment Dates:  8/22/2024 - 10/1/2024.           Review of Systems Refer to nursing note.          Objective   /94   Pulse 90   Temp 97.7 °F (36.5 °C)   Wt 94.3 kg (207 lb 12.8 oz)   SpO2 97%   BMI 28.98 kg/m²     Pain Screening:  Pain Score:   6  ECOG    Physical Exam   General Appearance:  Alert, cooperative, no distress, appears stated age  Cardiovascular:  No cyanosis  Lungs: Respirations unlabored, able to speak in complete sentences without dyspnea.  Abdomen: Non-distended  Skin: No generalized rash or dermatitis  Neurologic: ANOx3, speech and cognition intact.        Administrative Statements   I have spent a total time of 20 minutes in caring for this patient on the day of the visit/encounter including Diagnostic results, Instructions for management, Impressions, Counseling / Coordination of care, Documenting in the medical record, Reviewing/placing orders in the  "medical record (including tests, medications, and/or procedures), and Obtaining or reviewing history  .  Portions of the record may have been created with voice recognition software.  Occasional wrong word or \"sound a like\" substitutions may have occurred due to the inherent limitations of voice recognition software.  Read the chart carefully and recognize, using context, where substitutions have occurred.  "

## 2025-05-08 ENCOUNTER — RA CDI HCC (OUTPATIENT)
Dept: OTHER | Facility: HOSPITAL | Age: 75
End: 2025-05-08

## 2025-05-08 PROBLEM — U07.1 COVID-19: Chronic | Status: RESOLVED | Noted: 2022-09-13 | Resolved: 2025-05-08

## 2025-05-15 ENCOUNTER — OFFICE VISIT (OUTPATIENT)
Dept: FAMILY MEDICINE CLINIC | Facility: CLINIC | Age: 75
End: 2025-05-15
Payer: MEDICARE

## 2025-05-15 VITALS
HEART RATE: 70 BPM | HEIGHT: 71 IN | BODY MASS INDEX: 29.46 KG/M2 | WEIGHT: 210.4 LBS | SYSTOLIC BLOOD PRESSURE: 148 MMHG | OXYGEN SATURATION: 98 % | DIASTOLIC BLOOD PRESSURE: 82 MMHG | TEMPERATURE: 97.3 F

## 2025-05-15 DIAGNOSIS — C61 PROSTATE CANCER (HCC): ICD-10-CM

## 2025-05-15 DIAGNOSIS — Z12.12 SCREENING FOR COLORECTAL CANCER: ICD-10-CM

## 2025-05-15 DIAGNOSIS — R03.0 ELEVATED BP WITHOUT DIAGNOSIS OF HYPERTENSION: ICD-10-CM

## 2025-05-15 DIAGNOSIS — Z00.00 MEDICARE ANNUAL WELLNESS VISIT, SUBSEQUENT: Primary | ICD-10-CM

## 2025-05-15 DIAGNOSIS — F41.1 GAD (GENERALIZED ANXIETY DISORDER): ICD-10-CM

## 2025-05-15 DIAGNOSIS — Z12.11 SCREENING FOR COLORECTAL CANCER: ICD-10-CM

## 2025-05-15 DIAGNOSIS — M72.0 DUPUYTREN CONTRACTURE OF BOTH HANDS: ICD-10-CM

## 2025-05-15 DIAGNOSIS — R73.9 HYPERGLYCEMIA: ICD-10-CM

## 2025-05-15 DIAGNOSIS — J30.9 ALLERGIC RHINITIS, UNSPECIFIED SEASONALITY, UNSPECIFIED TRIGGER: Chronic | ICD-10-CM

## 2025-05-15 DIAGNOSIS — E78.5 HYPERLIPIDEMIA, UNSPECIFIED HYPERLIPIDEMIA TYPE: ICD-10-CM

## 2025-05-15 PROCEDURE — G0439 PPPS, SUBSEQ VISIT: HCPCS | Performed by: FAMILY MEDICINE

## 2025-05-15 PROCEDURE — 99214 OFFICE O/P EST MOD 30 MIN: CPT | Performed by: FAMILY MEDICINE

## 2025-05-15 PROCEDURE — G2211 COMPLEX E/M VISIT ADD ON: HCPCS | Performed by: FAMILY MEDICINE

## 2025-05-15 NOTE — ASSESSMENT & PLAN NOTE
Lab Results   Component Value Date    GLUF 92 05/01/2025     Well controlled  Encouraged low carb diet and exercise  Orders:  •  Hemoglobin A1C; Future

## 2025-05-15 NOTE — PROGRESS NOTES
Name: Ezio Muir      : 1950      MRN: [id number]  Encounter Provider: Joe Wylie DO  Encounter Date: 5/15/2025   Encounter department: [facility]  :  Assessment & Plan  Medicare annual wellness visit, subsequent         Allergic rhinitis, unspecified seasonality, unspecified trigger  Well controlled  Does not require anti-histamine       Prostate cancer (HCC)  Stable  Psa is slightly increased but urology is monitoring       ARTEMIO (generalized anxiety disorder)  Stable  Continue lexapro 5mg       Hyperlipidemia, unspecified hyperlipidemia type  Lab Results   Component Value Date    LDLCALC 131 (H) 2025     Stable  Continue lovastatin 40mg  Orders:  •  Lipid panel; Future    Hyperglycemia  Lab Results   Component Value Date    GLUF 92 2025     Well controlled  Encouraged low carb diet and exercise  Orders:  •  Hemoglobin A1C; Future    Screening for colorectal cancer    Orders:  •  Ambulatory Referral to Colorectal Surgery; Future    Dupuytren contracture of both hands  Worsening  Previously had repair  Right 4th finger is deformed and cannot lay flat on table  Referral to hand ortho provided  Orders:  •  Ambulatory Referral to Orthopedic Surgery; Future    Elevated BP without diagnosis of hypertension    Orders:  •  Albumin / creatinine urine ratio; Future  •  Comprehensive metabolic panel; Future           History of Present Illness   HPI presents today for routine follow up. Has no concerns  Review of Systems   Constitutional:  Negative for activity change, chills, diaphoresis and fever.   HENT:  Negative for ear pain, hearing loss, postnasal drip, rhinorrhea, sinus pressure, sinus pain, sneezing and sore throat.    Respiratory:  Negative for cough, chest tightness, shortness of breath and wheezing.    Cardiovascular:  Negative for chest pain, palpitations and leg swelling.   Gastrointestinal:  Negative for abdominal pain, blood in stool,  "constipation, diarrhea, nausea and vomiting.   Genitourinary:  Negative for dysuria, frequency, hematuria and urgency.   Musculoskeletal:  Negative for arthralgias and myalgias.   Neurological:  Negative for dizziness, syncope, weakness, light-headedness, numbness and headaches.       Objective   /82 (BP Location: Left arm, Patient Position: Sitting, Cuff Size: Large)   Pulse 70   Temp (!) 97.3 °F (36.3 °C)   Ht 5' 11\" (1.803 m)   Wt 95.4 kg (210 lb 6.4 oz)   SpO2 98%   BMI 29.34 kg/m²      Physical Exam  Vitals reviewed.   Constitutional:       General: He is not in acute distress.     Appearance: He is well-developed. He is not diaphoretic.   HENT:      Head: Normocephalic and atraumatic.      Right Ear: Tympanic membrane, ear canal and external ear normal. There is no impacted cerumen.      Left Ear: Tympanic membrane, ear canal and external ear normal. There is no impacted cerumen.      Nose: Nose normal. No congestion.      Mouth/Throat:      Mouth: Mucous membranes are moist.      Pharynx: Oropharynx is clear.     Eyes:      General: No scleral icterus.        Right eye: No discharge.         Left eye: No discharge.      Conjunctiva/sclera: Conjunctivae normal.      Pupils: Pupils are equal, round, and reactive to light.     Neck:      Vascular: No JVD.     Cardiovascular:      Rate and Rhythm: Normal rate and regular rhythm.      Heart sounds: Normal heart sounds. No murmur heard.     No friction rub.   Pulmonary:      Effort: Pulmonary effort is normal. No respiratory distress.      Breath sounds: Normal breath sounds. No wheezing or rales.   Chest:      Chest wall: No tenderness.   Abdominal:      General: Bowel sounds are normal. There is no distension.      Palpations: Abdomen is soft. There is no mass.      Tenderness: There is no abdominal tenderness. There is no guarding or rebound.     Musculoskeletal:         General: No tenderness or deformity. Normal range of motion.        Hands:       " Cervical back: Normal range of motion and neck supple.     Skin:     General: Skin is warm and dry.      Findings: No erythema or rash.     Neurological:      Mental Status: He is alert and oriented to person, place, and time. Mental status is at baseline.      Cranial Nerves: No cranial nerve deficit.     Psychiatric:         Mood and Affect: Mood normal.

## 2025-05-15 NOTE — ASSESSMENT & PLAN NOTE
Lab Results   Component Value Date    LDLCALC 131 (H) 05/01/2025     Stable  Continue lovastatin 40mg  Orders:  •  Lipid panel; Future

## 2025-05-17 ENCOUNTER — OFFICE VISIT (OUTPATIENT)
Dept: OBGYN CLINIC | Facility: CLINIC | Age: 75
End: 2025-05-17
Payer: MEDICARE

## 2025-05-17 VITALS — BODY MASS INDEX: 29.4 KG/M2 | HEIGHT: 71 IN | WEIGHT: 210 LBS

## 2025-05-17 DIAGNOSIS — M72.0: ICD-10-CM

## 2025-05-17 DIAGNOSIS — M72.0 DUPUYTREN CONTRACTURE OF LEFT HAND: ICD-10-CM

## 2025-05-17 DIAGNOSIS — M20.001 FINGER DEFORMITY, ACQUIRED, RIGHT: Primary | ICD-10-CM

## 2025-05-17 DIAGNOSIS — M20.002 FINGER DEFORMITY, ACQUIRED, LEFT: ICD-10-CM

## 2025-05-17 PROCEDURE — 99203 OFFICE O/P NEW LOW 30 MIN: CPT | Performed by: PHYSICIAN ASSISTANT

## 2025-05-17 NOTE — PROGRESS NOTES
Orthopaedic Surgery - Office Note  Ezio Muir (74 y.o. male)   : 1950   MRN: [id number]  Encounter Date: 2025    Chief Complaint   Patient presents with    Right Hand - Pain    Left Hand - Pain         Assessment & Plan  Finger(small) deformity, acquired, right    Orders:    Ambulatory Referral to Orthopedic Surgery; Future    Dupuytren disease of right finger(ring) with contracture    Orders:    Ambulatory Referral to Orthopedic Surgery; Future    Finger(small) deformity, acquired, left    Orders:    Ambulatory Referral to Orthopedic Surgery; Future    Dupuytren contracture of left hand    Orders:    Ambulatory Referral to Orthopedic Surgery; Future    Dupuytren disease of both palm and finger  The diagnosis as well as treatment options were reviewed with the patient in the office today  The natural progression of the disease was reviewed.  I discussed with the patient the enzyme treatment options and the need to see a hand surgeon for this office procedure was reviewed.  He will be referred as such.  Patient has stated he does not wish to consider any type of surgical treatment option.  We reviewed physical therapy will not improve his symptoms.  All question concerns were answered in the office today          No follow-ups on file.        History of Present Illness  This is a new patient with right and left small finger deformity.  Patient discussed the symptoms with PCP on 5/15/2025 and was referred for evaluation and treatment.  He has had the finger deformities for many years.  No trauma or significant pain is reported.  He reports he is able to golf without a problem but shaking hands has become more and more difficult with the contracture in the small fingers.  He reports a history of Dupuytren's contracture for which she had a surgery with Dr. Obrien in .    Patient reports he has no interest in any surgical treatment for the Dupuytren's but is interested in discussing the enzyme  "injection treatment option.    Review of Systems  Pertinent items are noted in HPI.  All other systems were reviewed and are negative.    Physical Exam  Ht 5' 11\" (1.803 m)   Wt 95.3 kg (210 lb)   BMI 29.29 kg/m²   Cons: Appears well.  No apparent distress.  Psych: Alert. Oriented x3.  Mood and affect normal.    Patient's right and left hand/digits have noted Dupuytren disease with significant rigid flexion contracture of the small fingers bilaterally.  He is nontender to light and deep palpation in the palm and digits today in the office.  He is neurovascularly intact.  No trigger fingers are appreciated in the office today          Studies Reviewed  PCP notes from 5/15/2025 were reviewed by myself in the office today    Procedures  No procedures today.    Medical, Surgical, Family, and Social History  The patient's medical history, family history, and social history, were reviewed and updated as appropriate.    Past Medical History:   Diagnosis Date    Anxiety     Dupuytren contracture     bilateral    Elevated PSA [id number]    Took PSA test 3 years ago, with a result of 2.7. Took snother tedt approximately June 1, 2023 with trsult of 4.2    Hyperlipidemia     Prostate cancer (HCC)        Past Surgical History:   Procedure Laterality Date    COLONOSCOPY  2022    COLONOSCOPY W/ POLYPECTOMY  09/23/2004    EYE SURGERY Bilateral November 2020    Cataract and laser on both eyes    HAND SURGERY Bilateral 12/2008    DUPUYTRENS CONTRACTURE SURGICAL RELEASE    INSERTION OF FIDUCIAL MARKER (TRANSRECTAL ULTRASOUND GUIDANCE) N/A 07/25/2024    Procedure: INSERTION OF FIDUCIAL MARKER ,SPACEOAR;  Surgeon: Judson Deluca MD;  Location:  MAIN OR;  Service: Urology    TX BX PROSTATE STRTCTC SATURATION SAMPLING IMG GID N/A 02/23/2024    Procedure: TRANSPERINEAL ULTRASOUND GUIDED BIOPSY PROSTATE;  Surgeon: Melo Chris MD;  Location: [facility];  Service: Urology       Family History   Problem Relation Age of Onset    " Coronary artery disease Mother     Diabetes Mother     Cirrhosis Father     Diabetes type II Family         multiple relatives    Heart disease Family         ASCVD       Social History     Occupational History    Not on file   Tobacco Use    Smoking status: Some Days     Current packs/day: 0.00     Types: Cigars, Cigarettes     Start date:      Last attempt to quit: 1980     Years since quittin.4     Passive exposure: Current    Smokeless tobacco: Never    Tobacco comments:     Cigar once and a while when I play golf; on average I play two or three times a week   Vaping Use    Vaping status: Never Used   Substance and Sexual Activity    Alcohol use: Yes     Alcohol/week: 7.0 standard drinks of alcohol     Types: 7 Standard drinks or equivalent per week     Comment: With dinner    Drug use: Never    Sexual activity: Not Currently       Allergies[1]    Current Medications[2]      Elio Ashton PA-C         [1] No Known Allergies  [2]   Current Outpatient Medications:     escitalopram (LEXAPRO) 5 mg tablet, Take 1 tablet (5 mg total) by mouth daily, Disp: 90 tablet, Rfl: 0    lovastatin (MEVACOR) 40 MG tablet, Take 1 tablet (40 mg total) by mouth daily, Disp: 90 tablet, Rfl: 3

## 2025-05-17 NOTE — PATIENT INSTRUCTIONS
"Patient Education     Dupuytren's contracture   The Basics   Written by the doctors and editors at [facility]   What is Dupuytren's contracture? -- This is a condition that affects 1 or both hands. It causes the tissue under the skin on the palm to thicken. Over time, this can affect how the fingers move.  Dupuytren's contracture usually gets worse slowly over many years. Most often, it involves the ring finger and little finger.  What are the symptoms of Dupuytren's contracture? -- Early on, the tissue under the skin on the palm of the hand becomes thick. This is usually painless.  Later on, people can have other symptoms that include:   Hard bumps (called \"nodules\") under the skin on the palm   Bands of thick tissue under the skin on the palm   Finger joint stiffness   Trouble straightening 1 or more fingers all of the way (usually the ring and little fingers)  Some people have mild symptoms and can use their hand without difficulty. Others have severe symptoms and trouble using their hand for everyday activities and tasks.  Is there a test for Dupuytren's contracture? -- No. But your doctor or nurse should be able to tell if you have it by learning about your symptoms and doing an exam.  How is Dupuytren's contracture treated? -- It is treated in different ways, depending on how severe the symptoms are. Treatment can't stop the condition from getting worse, but it can improve symptoms.  If your symptoms are mild, there are some things you can do to help keep them from getting worse. You can cushion tool handles and other items you need to  by putting tape on them. You can also use padded gloves when you grab or hold heavy objects.  If your symptoms are severe, your doctor will talk with you about treatments to help your fingers move and straighten. All of the treatments involve removing or breaking apart the thick tissue (or bands of tissue) under the skin.  There are different treatment options. They " include:   Surgery - A doctor can remove or break apart the thick tissue.   A procedure - A doctor can stick a needle in your palm to break apart the thick tissue.   Medicine - You can get a shot of medicine in your palm. The medicine softens and breaks up the thick tissue.  To decide which treatment is right for you, talk with your doctor about the benefits and downsides of each option.  All topics are updated as new evidence becomes available and our peer review process is complete.  This topic retrieved from [employer] on: Apr 24, 2024.  Topic 43161 Version 12.0  Release: 32.3.2 - C32.113  © 2024 UpToDate, Inc. and/or its affiliates. All rights reserved.  picture 1: Dupuytren's contracture     When people have Dupuytren's contracture, the tissue under the skin in the palm of the hand gets thick. Over time, this makes the fingers (usually the ring and little fingers) stiff and keeps them from straightening all the way.  Reproduced with permission from: Raisa RP, Viki RW, Goldberg VM. Soft Tissue Rheumatic Pain: Recognition, Management, Prevention, 3rd ed, [employer] 1996. Copyright © 1996 [employer].  http://www.lww.com   Graphic 14780 Version 6.0  Consumer Information Use and Disclaimer   Disclaimer: This generalized information is a limited summary of diagnosis, treatment, and/or medication information. It is not meant to be comprehensive and should be used as a tool to help the user understand and/or assess potential diagnostic and treatment options. It does NOT include all information about conditions, treatments, medications, side effects, or risks that may apply to a specific patient. It is not intended to be medical advice or a substitute for the medical advice, diagnosis, or treatment of a health care provider based on the health care provider's examination and assessment of a patient's specific and unique circumstances. Patients must speak with a health care provider  for complete information about their health, medical questions, and treatment options, including any risks or benefits regarding use of medications. This information does not endorse any treatments or medications as safe, effective, or approved for treating a specific patient. UpToDate, Inc. and its affiliates disclaim any warranty or liability relating to this information or the use thereof.The use of this information is governed by the Terms of Use, available at https://www.woltersVeohuwer.com/en/know/clinical-effectiveness-terms. 2024© UpToDate, Inc. and its affiliates and/or licensors. All rights reserved.  Copyright   © 2024 UpToDate, Inc. and/or its affiliates. All rights reserved.

## 2025-05-20 DIAGNOSIS — Z00.00 ENCOUNTER FOR PREVENTIVE HEALTH EXAMINATION: ICD-10-CM

## 2025-05-20 DIAGNOSIS — Z00.00 WELL ADULT EXAM: Primary | ICD-10-CM

## 2025-05-20 DIAGNOSIS — Z72.0 TOBACCO USE: ICD-10-CM

## 2025-05-29 ENCOUNTER — OFFICE VISIT (OUTPATIENT)
Dept: OBGYN CLINIC | Facility: CLINIC | Age: 75
End: 2025-05-29
Payer: MEDICARE

## 2025-05-29 VITALS — HEIGHT: 70 IN | BODY MASS INDEX: 29.35 KG/M2 | WEIGHT: 205 LBS

## 2025-05-29 DIAGNOSIS — M72.0 DUPUYTREN'S CONTRACTURE OF RIGHT HAND: ICD-10-CM

## 2025-05-29 DIAGNOSIS — M72.0 DUPUYTREN'S DISEASE: Primary | ICD-10-CM

## 2025-05-29 PROCEDURE — 99203 OFFICE O/P NEW LOW 30 MIN: CPT | Performed by: SURGERY

## 2025-05-29 NOTE — PROGRESS NOTES
Josie Dang M.D.  Attending, Orthopaedic Surgery  Hand, Wrist, and Elbow Surgery  [facility]      ORTHOPAEDIC HAND, WRIST, AND ELBOW OFFICE  VISIT       ASSESSMENT/PLAN:        Assessment & Plan  Dupuytren's disease  Dupuytren's contracture of right hand    The etiology of Dupuytren's disease was discussed along with treatment options.   It was discussed with the patient that his left small finger PIP contracture is likely not secondary to Dupuytren's disease and more of a joint contracture. There is no real significant palpable cord. If he would like to do something with regards to the left hand, we discussed a joint capsular release of the PIP joint of the left small finger as well as partial Dupuytren's excision. He is not interested in surgery at this time.   With regards to his right small finger MCP and PIP contracture, we discussed this is a good candidate for Xiaflex injection and manipulation. We discussed he would present to the office on a Monday for the injection. He would follow up on a Wednesday for manipulation. We discussed pain, bruising and swelling even into the armpit after the injection. With the manipulation we discussed the possibility of a skin tear. After the manipulation, he will be immobilized in an extension splint that will be made by OT for approx. 4 months. It is likely his finger will not be fully straight, but extension should improve. We also discussed the possibility of formal OT after the manipulation to work on stretching and ROM exercises.   He elected to proceed with Xiaflex for the right small finger MCP and PIP contracture, prior authorization was submitted.   I will see him back in the office once Xiaflex is approved. Patient notes if Xiaflex does not end up being approved, this is currently livable for him and he does not wish to proceed with surgical intervention.   Orders:    Injection Procedure Prior Authorization; Future      The patient  verbalized understanding of exam findings and treatment plan. We engaged in the shared decision-making process and treatment options were discussed at length with the patient. Surgical and conservative management discussed today along with risks and benefits.    Diagnoses and all orders for this visit:    Dupuytren's disease  -     Injection Procedure Prior Authorization; Future    Dupuytren's contracture of right hand  -     Injection Procedure Prior Authorization; Future    Other orders  -     Ambulatory Referral to Orthopedic Surgery      Follow Up:  Return for once Xiaflex is approved .    To Do Next Visit:  Re-evaluation of current issue      General Discussions:  Dupuytren's Disease:  The anatomy and physiology of Dupuytren's disease were discussed with the patient today in the office.  Increased collagen formation (similar to scar tissue formation but without injury) within the interval between the skin volarly and the flexor tendons dorsally can result in pit formation, nodular formation, or eventual cord formation.  These pathologic cords can cause contracture at either the metacarpophalangeal joint, proximal interphalangeal joint, or both.  As the cords progress towards the proximal interphalangeal joint, the neurovascular structures of the finger may become involved within the disease process.  While this is a genetic condition with variable penetrance, repetitive micro trauma may trigger the development of cord formation and thus contracture.  Conservative treatment options including therapy to maintain joint mobility and tabletop testing were discussed.  Other treatments include Xiaflex (collagenase) injection and surgical excision of abnormal cords.     ____________________________________________________________________________________________________________________________________________      CHIEF COMPLAINT:  Chief Complaint   Patient presents with    Right Hand - Pain     Dupuytren     Left Hand  - Pain       SUBJECTIVE:  Ezio Muir is a 74 y.o. year old RHD male who presents to the office for evaluation of both hands. I am seeing Ezio in consultation at the request of Elio Ashton PA-C. He has a known history of Dupuytren's disease. He did undergo surgery on the right hand in 2008 with improvement. He notes slow progression of bilateral small finger flexion contractures. He notes right small finger is worse then the left. He has a difficulty shaking hands secondary to the contracture. He denies pain.       Pain/symptom timing:  Worse during the day when active  Pain/symptom context:  Worse with activites and work  Pain/symptom modifying factors:  Rest makes better, activities make worse  Pain/symptom associated signs/symptoms: none    Prior treatment   NSAIDsNo   Injections No   Bracing/Orthotics No    Physical Therapy No     I have personally reviewed all the relevant PMH, PSH, SH, FH, Medications and allergies      PAST MEDICAL HISTORY:  Past Medical History[1]    PAST SURGICAL HISTORY:  Past Surgical History[2]    FAMILY HISTORY:  Family History[3]    SOCIAL HISTORY:  Social History[4]    MEDICATIONS:  Current Medications[5]    ALLERGIES:  Allergies[6]        REVIEW OF SYSTEMS:  Review of Systems   Constitutional:  Negative for chills, fever and unexpected weight change.   HENT:  Negative for hearing loss, nosebleeds and sore throat.    Eyes:  Negative for pain, redness and visual disturbance.   Respiratory:  Negative for cough, shortness of breath and wheezing.    Cardiovascular:  Negative for chest pain, palpitations and leg swelling.   Gastrointestinal:  Negative for abdominal pain, nausea and vomiting.   Endocrine: Negative for polydipsia and polyuria.   Genitourinary:  Negative for difficulty urinating and hematuria.   Musculoskeletal:  Negative for arthralgias, joint swelling and myalgias.   Skin:  Negative for rash and wound.   Neurological:  Negative for dizziness, numbness and headaches.    Psychiatric/Behavioral:  Negative for decreased concentration, dysphoric mood and suicidal ideas. The patient is not nervous/anxious.        VITALS:  There were no vitals filed for this visit.    LABS:      _____________________________________________________  PHYSICAL EXAMINATION:  General: well developed and well nourished, alert, oriented times 3, and appears comfortable  Psychiatric: Normal  HEENT: Normocephalic, Atraumatic Trachea Midline, No torticollis  Pulmonary: No audible wheezing or respiratory distress   Abdomen/GI: Non tender, non distended   Cardiovascular: No pitting edema, 2+ radial pulse   Skin: No masses, erythema, lacerations, fluctation, ulcerations  Neurovascular: Sensation Intact to the Median, Ulnar, Radial Nerve, Motor Intact to the Median, Ulnar, Radial Nerve, and Pulses Intact  Musculoskeletal: Normal, except as noted in detailed exam and in HPI.      MUSCULOSKELETAL EXAMINATION:    Anticipated Initial injection date: ASAP          Number of vials 1    Diagnosis Code M72.0  yes     Right hand: # of MP joints to treat 1   # of PIP joints to treat 1  Affected finger(s)  R5    Contracture had a palpable cord yes      Right hand: degree of contracture  45 MP     40 PIP      Positive Tabletop test  yes     Right small finger PIP contracture or 40 degrees, MP contracture 45 degrees, palpable pre tendinous cord into a spiral cod, + table top test     Left small finger PIP contracture, no significant underlying palpable Dupuytren's cord, mild palpable cord   ___________________________________________________  STUDIES REVIEWED:  No imaging to review     LABS REVIEWED:    HgA1c:   Lab Results   Component Value Date    HGBA1C 5.1 10/02/2024     BMP:   Lab Results   Component Value Date    CALCIUM 9.3 05/01/2025    K 3.9 05/01/2025    CO2 24 05/01/2025     (H) 05/01/2025    BUN 18 05/01/2025    CREATININE 0.93 05/01/2025               PROCEDURES PERFORMED:  Procedures  No Procedures performed  today    _____________________________________________________      Scribe Attestation      I,:  Jazz Mendoza MA am acting as a scribe while in the presence of the attending physician.:       I,:  Josie Dang MD personally performed the services described in this documentation    as scribed in my presence.:                        [1]   Past Medical History:  Diagnosis Date    Anxiety     Dupuytren contracture     bilateral    Elevated PSA     Took PSA test 3 years ago, with a result of 2.7. Took snother tedt approximately 2023 with trsult of 4.2    Hyperlipidemia     Prostate cancer (HCC)    [2]   Past Surgical History:  Procedure Laterality Date    COLONOSCOPY      COLONOSCOPY W/ POLYPECTOMY  2004    EYE SURGERY Bilateral 2020    Cataract and laser on both eyes    HAND SURGERY Bilateral 2008    DUPUYTRENS CONTRACTURE SURGICAL RELEASE    INSERTION OF FIDUCIAL MARKER (TRANSRECTAL ULTRASOUND GUIDANCE) N/A 2024    Procedure: INSERTION OF FIDUCIAL MARKER ,SPACEOAR;  Surgeon: Judson Deluca MD;  Location:  MAIN OR;  Service: Urology    IN BX PROSTATE STRTCTC SATURATION SAMPLING IMG GID N/A 2024    Procedure: TRANSPERINEAL ULTRASOUND GUIDED BIOPSY PROSTATE;  Surgeon: Melo Chris MD;  Location: [facility];  Service: Urology   [3]   Family History  Problem Relation Name Age of Onset    Coronary artery disease Mother Alma Isadora     Diabetes Mother Alma Isadora     Cirrhosis Father      Diabetes type II Family          multiple relatives    Heart disease Family          ASCVD   [4]   Social History  Tobacco Use    Smoking status: Some Days     Current packs/day: 0.00     Types: Cigars, Cigarettes     Start date:      Last attempt to quit: 1980     Years since quittin.4     Passive exposure: Current    Smokeless tobacco: Never    Tobacco comments:     Cigar once and a while when I play golf; on average I play two or three times a week    Vaping Use    Vaping status: Never Used   Substance Use Topics    Alcohol use: Yes     Alcohol/week: 7.0 standard drinks of alcohol     Types: 7 Standard drinks or equivalent per week     Comment: With dinner    Drug use: Never   [5]   Current Outpatient Medications:     escitalopram (LEXAPRO) 5 mg tablet, Take 1 tablet (5 mg total) by mouth daily, Disp: 90 tablet, Rfl: 0    lovastatin (MEVACOR) 40 MG tablet, Take 1 tablet (40 mg total) by mouth daily, Disp: 90 tablet, Rfl: 3  [6] No Known Allergies

## 2025-06-01 PROBLEM — Z00.00 WELL ADULT EXAM: Status: ACTIVE | Noted: 2019-08-13

## 2025-06-01 NOTE — ASSESSMENT & PLAN NOTE
1st    Mr Muir and his family live in [place], [place].    Yousif's past medical history is significant for prostate cancer, which was diagnosed in 2024.  Status post radiation treatment.  He also has history of hyperlipidemia, for which he takes lovastatin 40 mg daily.  He also has history of generalized anxiety disorder, for which she is treated with Lexapro 5 mg daily.    His past surgical history is significant for Dupuytren's contracture surgery in 2008    Yousif's family history is significant for hypertension, hyperlipidemia, cirrhosis (father), cardiovascular disease, diabetes (mother), diabetes, hypertension, hyperlipidemia (brother).    Yousif admits to smoking 1 to 2 cigars weekly, on the golf [place].  He consumes 3-4 alcoholic beverages per week.  He drinks 1 to 2 cups of coffee daily.  He is  with 2 adult children.  Diet.  Exercises

## 2025-06-10 ENCOUNTER — HOSPITAL ENCOUNTER (OUTPATIENT)
Dept: VASCULAR ULTRASOUND | Facility: HOSPITAL | Age: 75
Discharge: HOME | End: 2025-06-10
Attending: FAMILY MEDICINE

## 2025-06-10 ENCOUNTER — HOSPITAL ENCOUNTER (OUTPATIENT)
Dept: NON INVASIVE DIAGNOSTICS | Facility: CLINIC | Age: 75
Discharge: HOME | End: 2025-06-10

## 2025-06-10 ENCOUNTER — HOSPITAL ENCOUNTER (OUTPATIENT)
Dept: ULTRASOUND IMAGING | Facility: HOSPITAL | Age: 75
Discharge: HOME | End: 2025-06-10
Attending: FAMILY MEDICINE

## 2025-06-10 ENCOUNTER — APPOINTMENT (OUTPATIENT)
Dept: LAB | Facility: CLINIC | Age: 75
End: 2025-06-10
Attending: FAMILY MEDICINE

## 2025-06-10 ENCOUNTER — HOSPITAL ENCOUNTER (OUTPATIENT)
Dept: CT IMAGING | Facility: HOSPITAL | Age: 75
Discharge: HOME | End: 2025-06-10
Attending: FAMILY MEDICINE

## 2025-06-10 ENCOUNTER — OFFICE VISIT (OUTPATIENT)
Dept: FAMILY MEDICINE CLINIC | Facility: CLINIC | Age: 75
End: 2025-06-10

## 2025-06-10 VITALS
RESPIRATION RATE: 14 BRPM | OXYGEN SATURATION: 98 % | BODY MASS INDEX: 29.2 KG/M2 | WEIGHT: 204 LBS | DIASTOLIC BLOOD PRESSURE: 84 MMHG | HEART RATE: 73 BPM | SYSTOLIC BLOOD PRESSURE: 126 MMHG | HEIGHT: 70 IN

## 2025-06-10 VITALS
WEIGHT: 203.93 LBS | SYSTOLIC BLOOD PRESSURE: 126 MMHG | BODY MASS INDEX: 29.19 KG/M2 | HEART RATE: 68 BPM | HEIGHT: 70 IN | DIASTOLIC BLOOD PRESSURE: 84 MMHG

## 2025-06-10 VITALS — BODY MASS INDEX: 29.35 KG/M2 | WEIGHT: 205 LBS | HEIGHT: 70 IN

## 2025-06-10 DIAGNOSIS — I77.810 THORACIC AORTIC ECTASIA (HCC): ICD-10-CM

## 2025-06-10 DIAGNOSIS — D22.9 MULTIPLE BENIGN NEVI: ICD-10-CM

## 2025-06-10 DIAGNOSIS — Z00.00 ENCOUNTER FOR PREVENTIVE HEALTH EXAMINATION: ICD-10-CM

## 2025-06-10 DIAGNOSIS — L81.4 LENTIGINES: ICD-10-CM

## 2025-06-10 DIAGNOSIS — I72.3 COMMON ILIAC ANEURYSM (HCC): ICD-10-CM

## 2025-06-10 DIAGNOSIS — F41.1 GAD (GENERALIZED ANXIETY DISORDER): ICD-10-CM

## 2025-06-10 DIAGNOSIS — D23.5 DILATED PORE OF WINER OF BACK: ICD-10-CM

## 2025-06-10 DIAGNOSIS — C61 PROSTATE CANCER (HCC): ICD-10-CM

## 2025-06-10 DIAGNOSIS — E78.5 HYPERLIPIDEMIA, UNSPECIFIED HYPERLIPIDEMIA TYPE: ICD-10-CM

## 2025-06-10 DIAGNOSIS — L82.1 STUCCO KERATOSES: ICD-10-CM

## 2025-06-10 DIAGNOSIS — Z00.00 WELL ADULT EXAM: ICD-10-CM

## 2025-06-10 DIAGNOSIS — E66.3 OVERWEIGHT (BMI 25.0-29.9): Primary | ICD-10-CM

## 2025-06-10 DIAGNOSIS — L82.1 SEBORRHEIC KERATOSES: ICD-10-CM

## 2025-06-10 DIAGNOSIS — Z72.0 TOBACCO USE: ICD-10-CM

## 2025-06-10 DIAGNOSIS — I65.23 BILATERAL CAROTID ARTERY STENOSIS: ICD-10-CM

## 2025-06-10 DIAGNOSIS — L57.0 KERATOSIS, ACTINIC: ICD-10-CM

## 2025-06-10 LAB
25(OH)D3 SERPL-MCNC: 30.9 NG/ML (ref 30–100)
ALBUMIN SERPL BCG-MCNC: 4.4 G/DL (ref 3.5–5)
ALP SERPL-CCNC: 67 U/L (ref 34–104)
ALT SERPL W P-5'-P-CCNC: 14 U/L (ref 7–52)
ANION GAP SERPL CALCULATED.3IONS-SCNC: 4 MMOL/L (ref 4–13)
AORTIC ROOT: 3.6 CM
ASCENDING AORTA: 3.8 CM
AST SERPL W P-5'-P-CCNC: 11 U/L (ref 13–39)
ATRIAL RATE: 71 BPM
BACTERIA UR QL AUTO: NORMAL /HPF
BASOPHILS # BLD AUTO: 0.01 THOUSANDS/ÂΜL (ref 0–0.1)
BASOPHILS NFR BLD AUTO: 0 % (ref 0–1)
BILIRUB SERPL-MCNC: 0.73 MG/DL (ref 0.2–1)
BILIRUB UR QL STRIP: NEGATIVE
BSA FOR ECHO PROCEDURE: 2.1 M2
BUN SERPL-MCNC: 17 MG/DL (ref 5–25)
CALCIUM SERPL-MCNC: 9.1 MG/DL (ref 8.4–10.2)
CHEST PAIN STATEMENT: NORMAL
CHLORIDE SERPL-SCNC: 107 MMOL/L (ref 96–108)
CHOLEST SERPL-MCNC: 201 MG/DL (ref ?–200)
CLARITY UR: CLEAR
CO2 SERPL-SCNC: 28 MMOL/L (ref 21–32)
COLOR UR: COLORLESS
CREAT SERPL-MCNC: 0.89 MG/DL (ref 0.6–1.3)
CRP SERPL HS-MCNC: 0.99 MG/L
DOP CALC LVOT AREA: 3.46 CM2
DOP CALC LVOT DIAMETER: 2.1 CM
E WAVE DECELERATION TIME: 262 MS
E/A RATIO: 0.83
EOSINOPHIL # BLD AUTO: 0.04 THOUSAND/ÂΜL (ref 0–0.61)
EOSINOPHIL NFR BLD AUTO: 1 % (ref 0–6)
ERYTHROCYTE [DISTWIDTH] IN BLOOD BY AUTOMATED COUNT: 12.8 % (ref 11.6–15.1)
EST. AVERAGE GLUCOSE BLD GHB EST-MCNC: 108 MG/DL
FRACTIONAL SHORTENING: 38 (ref 28–44)
GFR SERPL CREATININE-BSD FRML MDRD: 84 ML/MIN/1.73SQ M
GLUCOSE P FAST SERPL-MCNC: 102 MG/DL (ref 65–99)
GLUCOSE UR STRIP-MCNC: NEGATIVE MG/DL
HBA1C MFR BLD: 5.4 %
HCT VFR BLD AUTO: 44.3 % (ref 36.5–49.3)
HCV AB SER QL: NORMAL
HDLC SERPL-MCNC: 55 MG/DL
HGB BLD-MCNC: 15.3 G/DL (ref 12–17)
HGB UR QL STRIP.AUTO: NEGATIVE
IMM GRANULOCYTES # BLD AUTO: 0.03 THOUSAND/UL (ref 0–0.2)
IMM GRANULOCYTES NFR BLD AUTO: 1 % (ref 0–2)
INTERVENTRICULAR SEPTUM IN DIASTOLE (PARASTERNAL SHORT AXIS VIEW): 1.3 CM
INTERVENTRICULAR SEPTUM: 1.3 CM (ref 0.6–1.1)
KETONES UR STRIP-MCNC: NEGATIVE MG/DL
LAAS-AP2: 16.4 CM2
LAAS-AP4: 15.7 CM2
LDLC SERPL CALC-MCNC: 121 MG/DL (ref 0–100)
LEFT ATRIUM AREA SYSTOLE SINGLE PLANE A4C: 16.9 CM2
LEFT ATRIUM SIZE: 3.6 CM
LEFT ATRIUM VOLUME (MOD BIPLANE): 45 ML
LEFT ATRIUM VOLUME INDEX (MOD BIPLANE): 21.4 ML/M2
LEFT INTERNAL DIMENSION IN SYSTOLE: 2.5 CM (ref 2.1–4)
LEFT VENTRICULAR INTERNAL DIMENSION IN DIASTOLE: 4 CM (ref 3.5–6)
LEFT VENTRICULAR POSTERIOR WALL IN END DIASTOLE: 1.3 CM
LEFT VENTRICULAR STROKE VOLUME: 45 ML
LEUKOCYTE ESTERASE UR QL STRIP: NEGATIVE
LV EF US.2D.A4C+ESTIMATED: 58 %
LVSV (TEICH): 45 ML
LYMPHOCYTES # BLD AUTO: 0.72 THOUSANDS/ÂΜL (ref 0.6–4.47)
LYMPHOCYTES NFR BLD AUTO: 16 % (ref 14–44)
MAX DIASTOLIC BP: 80 MMHG
MAX HR PERCENT: 79 %
MAX HR: 117 BPM
MAX PREDICTED HEART RATE: 147 BPM
MCH RBC QN AUTO: 31.9 PG (ref 26.8–34.3)
MCHC RBC AUTO-ENTMCNC: 34.5 G/DL (ref 31.4–37.4)
MCV RBC AUTO: 93 FL (ref 82–98)
MONOCYTES # BLD AUTO: 0.33 THOUSAND/ÂΜL (ref 0.17–1.22)
MONOCYTES NFR BLD AUTO: 8 % (ref 4–12)
MV E'TISSUE VEL-SEP: 8 CM/S
MV PEAK A VEL: 1.04 M/S
MV PEAK E VEL: 86 CM/S
MV STENOSIS PRESSURE HALF TIME: 76 MS
MV VALVE AREA P 1/2 METHOD: 2.9
NEUTROPHILS # BLD AUTO: 3.29 THOUSANDS/ÂΜL (ref 1.85–7.62)
NEUTS SEG NFR BLD AUTO: 74 % (ref 43–75)
NITRITE UR QL STRIP: NEGATIVE
NON-SQ EPI CELLS URNS QL MICRO: NORMAL /HPF
NRBC BLD AUTO-RTO: 0 /100 WBCS
P AXIS: 52 DEGREES
PH UR STRIP.AUTO: 5.5 [PH]
PLATELET # BLD AUTO: 182 THOUSANDS/UL (ref 149–390)
PMV BLD AUTO: 10.2 FL (ref 8.9–12.7)
POTASSIUM SERPL-SCNC: 4.5 MMOL/L (ref 3.5–5.3)
PR INTERVAL: 152 MS
PROT SERPL-MCNC: 6.9 G/DL (ref 6.4–8.4)
PROT UR STRIP-MCNC: NEGATIVE MG/DL
PROTOCOL NAME: NORMAL
PSA SERPL-MCNC: 1.13 NG/ML (ref 0–4)
QRS AXIS: 48 DEGREES
QRSD INTERVAL: 90 MS
QT INTERVAL: 390 MS
QTC INTERVAL: 424 MS
RATE PRESSURE PRODUCT: NORMAL
RBC # BLD AUTO: 4.79 MILLION/UL (ref 3.88–5.62)
RBC #/AREA URNS AUTO: NORMAL /HPF
REASON FOR TERMINATION: NORMAL
RIGHT ATRIUM AREA SYSTOLE A4C: 13.3 CM2
RIGHT VENTRICLE ID DIMENSION: 3.2 CM
SINOTUBULAR JUNCTION: 2.7 CM
SL CV LEFT ATRIUM LENGTH A2C: 5.3 CM
SL CV LV EF: 60
SL CV LV EF: 60
SL CV PED ECHO LEFT VENTRICLE DIASTOLIC VOLUME (MOD BIPLANE) 2D: 68 ML
SL CV PED ECHO LEFT VENTRICLE SYSTOLIC VOLUME (MOD BIPLANE) 2D: 23 ML
SL CV SINUS OF VALSALVA 2D: 3.4 CM
SL CV STRESS RECOVERY BP: NORMAL MMHG
SL CV STRESS RECOVERY HR: 79 BPM
SL CV STRESS RECOVERY O2 SAT: 98 %
SL CV STRESS STAGE REACHED: 2
SODIUM SERPL-SCNC: 139 MMOL/L (ref 135–147)
SP GR UR STRIP.AUTO: 1.01 (ref 1–1.03)
STJ: 2.7 CM
STRESS ANGINA INDEX: 0
STRESS BASELINE BP: NORMAL MMHG
STRESS BASELINE HR: 75 BPM
STRESS O2 SAT REST: 97 %
STRESS PEAK HR: 117 BPM
STRESS POST ESTIMATED WORKLOAD: 7 METS
STRESS POST EXERCISE DUR MIN: 6 MIN
STRESS POST EXERCISE DUR MIN: 6 MIN
STRESS POST EXERCISE DUR SEC: 0 SEC
STRESS POST O2 SAT PEAK: 99 %
STRESS POST PEAK BP: 230 MMHG
STRESS POST PEAK HR: 117 BPM
STRESS POST PEAK SYSTOLIC BP: 230 MMHG
T WAVE AXIS: 47 DEGREES
TARGET HR FORMULA: NORMAL
TEST INDICATION: NORMAL
TR MAX PG: 20 MMHG
TR PEAK VELOCITY: 2.2 M/S
TRICUSPID ANNULAR PLANE SYSTOLIC EXCURSION: 2.1 CM
TRICUSPID VALVE PEAK REGURGITATION VELOCITY: 2.24 M/S
TRIGL SERPL-MCNC: 127 MG/DL (ref ?–150)
TSH SERPL DL<=0.05 MIU/L-ACNC: 3.12 UIU/ML (ref 0.45–4.5)
UROBILINOGEN UR STRIP-ACNC: <2 MG/DL
VENTRICULAR RATE: 71 BPM
WBC # BLD AUTO: 4.42 THOUSAND/UL (ref 4.31–10.16)
WBC #/AREA URNS AUTO: NORMAL /HPF

## 2025-06-10 PROCEDURE — 76700 US EXAM ABDOM COMPLETE: CPT

## 2025-06-10 PROCEDURE — 80061 LIPID PANEL: CPT

## 2025-06-10 PROCEDURE — 93350 STRESS TTE ONLY: CPT | Performed by: INTERNAL MEDICINE

## 2025-06-10 PROCEDURE — 86141 C-REACTIVE PROTEIN HS: CPT

## 2025-06-10 PROCEDURE — 93306 TTE W/DOPPLER COMPLETE: CPT | Performed by: INTERNAL MEDICINE

## 2025-06-10 PROCEDURE — 36415 COLL VENOUS BLD VENIPUNCTURE: CPT

## 2025-06-10 PROCEDURE — 86803 HEPATITIS C AB TEST: CPT

## 2025-06-10 PROCEDURE — 82306 VITAMIN D 25 HYDROXY: CPT

## 2025-06-10 PROCEDURE — 80053 COMPREHEN METABOLIC PANEL: CPT

## 2025-06-10 PROCEDURE — 93922 UPR/L XTREMITY ART 2 LEVELS: CPT

## 2025-06-10 PROCEDURE — 81001 URINALYSIS AUTO W/SCOPE: CPT

## 2025-06-10 PROCEDURE — 93005 ELECTROCARDIOGRAM TRACING: CPT

## 2025-06-10 PROCEDURE — 75571 CT HRT W/O DYE W/CA TEST: CPT

## 2025-06-10 PROCEDURE — 85025 COMPLETE CBC W/AUTO DIFF WBC: CPT

## 2025-06-10 PROCEDURE — 84153 ASSAY OF PSA TOTAL: CPT

## 2025-06-10 PROCEDURE — 83695 ASSAY OF LIPOPROTEIN(A): CPT

## 2025-06-10 PROCEDURE — 83036 HEMOGLOBIN GLYCOSYLATED A1C: CPT

## 2025-06-10 PROCEDURE — 84443 ASSAY THYROID STIM HORMONE: CPT

## 2025-06-10 PROCEDURE — 93010 ELECTROCARDIOGRAM REPORT: CPT | Performed by: INTERNAL MEDICINE

## 2025-06-10 PROCEDURE — 99499EX: Performed by: FAMILY MEDICINE

## 2025-06-10 PROCEDURE — 93306 TTE W/DOPPLER COMPLETE: CPT

## 2025-06-10 PROCEDURE — VASC: Performed by: SURGERY

## 2025-06-10 PROCEDURE — 93350 STRESS TTE ONLY: CPT

## 2025-06-10 RX ORDER — ROSUVASTATIN CALCIUM 20 MG/1
20 TABLET, COATED ORAL DAILY
Qty: 90 TABLET | Refills: 1 | Status: SHIPPED | OUTPATIENT
Start: 2025-06-10

## 2025-06-10 NOTE — PROGRESS NOTES
[facility] Physical Exam     Ezio Muir is a 74 y.o. male who is presenting for his 1st [facility] Physical Exam at [facility]. Mr Muir and his family live in [place], [place].     Yousif's past medical history is significant for prostate cancer, which was diagnosed in 2024.  He is status post radiation treatment.  He also has history of hyperlipidemia, for which he takes lovastatin 40 mg daily.  He also has history of generalized anxiety disorder, for which he is treated with Lexapro 5 mg daily.     His past surgical history is significant for Dupuytren's contracture surgery in 2008.  He still has some ongoing problems with this, and is following with a hand specialist.     Yousif's family history is significant for hypertension, hyperlipidemia, cirrhosis (father), cardiovascular disease, diabetes (mother), diabetes, hypertension, hyperlipidemia (brother).     Yousif admits to smoking 1 to 2 cigars weekly, always on the golf course.  He consumes 6-8 alcoholic beverages per week.  He drinks 1 to 2 cups of coffee daily.  He is  with 2 adult children.  He states that his diet is healthy, although admits that he tends to snack in the evenings.  He is engaged in a regular exercise program; going to the gym 3 to 4 days/week (cardio and light weight training).    Review of Systems   Constitutional:  Negative for chills and fever.   HENT:  Negative for ear pain and sore throat.    Eyes:  Negative for pain and visual disturbance.   Respiratory:  Negative for cough and shortness of breath.    Cardiovascular:  Negative for chest pain and palpitations.   Gastrointestinal:  Negative for abdominal pain and vomiting.   Genitourinary:  Negative for dysuria and hematuria.   Musculoskeletal:  Negative for arthralgias and back pain.   Skin:  Negative for color change and rash.   Neurological:  Negative for seizures and syncope.   All other systems reviewed and are negative.        Active  "Ambulatory Problems     Diagnosis Date Noted   • HLD (hyperlipidemia) 08/01/2018   • Allergic rhinitis 08/01/2018   • Hyperglycemia 08/01/2018   • Benign prostatic hyperplasia with lower urinary tract symptoms 05/11/2023   • Elevated PSA 06/09/2023   • ARTEMIO (generalized anxiety disorder) 09/14/2023   • Prostate cancer (HCC) 04/30/2024   • Elevated BP without diagnosis of hypertension 10/15/2024   • Overweight (BMI 25.0-29.9) 06/10/2025   • Bilateral carotid artery stenosis 06/10/2025   • Common iliac aneurysm (HCC) 06/10/2025   • Thoracic aortic ectasia (HCC) 06/10/2025     Resolved Ambulatory Problems     Diagnosis Date Noted   • Well adult exam 08/13/2019   • COVID-19 09/13/2022     Past Medical History:   Diagnosis Date   • Anxiety    • Dupuytren contracture    • Hyperlipidemia        Past Surgical History[1]    Family History[2]    Tobacco Use History[3]    Current Medications[4]    Allergies[5]      Objective:    Vitals:    06/10/25 0757   BP: 126/84   Pulse: 73   Resp: 14   SpO2: 98%   Weight: 92.5 kg (204 lb)   Height: 5' 10\" (1.778 m)        Physical Exam  Vitals and nursing note reviewed.   Constitutional:       General: He is not in acute distress.     Appearance: He is well-developed.   HENT:      Head: Normocephalic and atraumatic.     Eyes:      Conjunctiva/sclera: Conjunctivae normal.       Cardiovascular:      Rate and Rhythm: Normal rate and regular rhythm.      Heart sounds: No murmur heard.  Pulmonary:      Effort: Pulmonary effort is normal. No respiratory distress.      Breath sounds: Normal breath sounds.   Abdominal:      Palpations: Abdomen is soft.      Tenderness: There is no abdominal tenderness.   Genitourinary:     Comments: JACQUELYN: being followed by urology    Musculoskeletal:         General: No swelling.      Cervical back: Neck supple.     Skin:     General: Skin is warm and dry.      Capillary Refill: Capillary refill takes less than 2 seconds.     Neurological:      Mental Status: He is " "alert.     Psychiatric:         Mood and Affect: Mood normal.             Assessment/Plan:     Assessment & Plan  Overweight (BMI 25.0-29.9)  Your weight today is 204 pounds.  Your body mass index is 29.27.  This puts you in the overweight category.  I would recommend a 15 pound weight loss goal.  Hopefully the information given to you today by our nutritionist and fitness expert will be helpful for you to achieve this goal.       Prostate cancer (HCC)  You have a history of [place] 4+3=7 prostate cancer diagnosed in February 2024.  You completed radiation therapy as well as androgen deprivation therapy.  Your PSA today was slightly elevated at 1.129.  This may be due to a phenomenon known as \"Lupron flare\", which is a temporary increase of PSA following androgen deprivation therapy.  I would recommend continue regular follow-up with your urologist as directed.       Hyperlipidemia, unspecified hyperlipidemia type  Your cholesterol level today is mildly elevated at 201.  Your bad cholesterol/LDL is also elevated at 121 (should be below 100).  Also, your vascular studies revealed plaque in your carotid arteries.  I would recommend working on lowering your LDL cholesterol to less than 70.  I think the best way to achieve this would be to switch your statin to rosuvastatin 20 mg daily.  I would also recommend recommend continuing to work on a heart healthy diet low in animal and dairy fats.  Also, I would recommend continuing your regular exercise program.  Orders:  •  rosuvastatin (CRESTOR) 20 MG tablet; Take 1 tablet (20 mg total) by mouth daily    Bilateral carotid artery stenosis  Your vascular screening today revealed a small amount of plaque in both your internal carotid arteries.  I would recommend switching to a more powerful statin (rosuvastatin 20).  I would also recommend repeating the scan again in 1 year.       Thoracic aortic ectasia (HCC)  Your ascending thoracic aorta was mildly dilated at 43 mm.  This " is called ectasia.  I would recommend repeating CT scanning of chest in 1 year to ensure stability.       Common iliac aneurysm (HCC)  Your iliac arteries show a slightly widened dimension of 1.5 cm.  This is called ectasia.  I would recommend repeating vascular imaging again in 1 year.       ARTEMIO (generalized anxiety disorder)  It appears that you are doing well on Lexapro 5 mg daily.       Keratosis, actinic  You have a number of precancerous lesions on your scalp and arms.  These are called actinic keratoses.  Our dermatologist has recommended that you have these treated by your dermatologist in the next few months.  If you would prefer one of our [facility] dermatologists, we would be happy to arrange follow-up for you.       Seborrheic keratoses         Multiple benign nevi         Lentigines         Stucco keratoses         Dilated pore of [place] of back           Executive Physical Summary:     (See below. Also, please refer to individually listed diagnoses, cardiology, dermatology audiology, fitness, and nutrition reports for more information).    Overall, I think you are in pretty good health today.  Your skin exam revealed a number of precancerous lesions on your scalp and arms.  Our dermatologist has advised that you get these treated in the next few months.  I would recommend follow-up with your regular dermatologist as directed.  Your cardiology testing was negative (including EKG, stress test, and echocardiogram).     As far as colon cancer screening is concerned, you are scheduled for a colonoscopy in in the next few weeks.    I reviewed your vaccination history.  You are current with all age-appropriate vaccinations at this time.  These include the latest COVID booster, seasonal flu shot, pneumonia vaccination, tetanus booster, RSV vaccine, and shingles vaccine (Shingrix).    Lastly, I would recommend continuing a healthy diet (including vitamin D 2000 international units daily and calcium 1200 mg  daily).  I would also recommend continuing your regular exercise program (at least 150 minutes of aerobic exercise weekly).           Yousif,    Thank you for choosing [facility].  It was a pleasure meeting and getting to know you today.  If you have any questions regarding today's exam, feel free to contact me. We hope to see you again in the future.     Jean Pierre Kilpatrick ([facility] Lead Physician)  [phone]  [email or web address]                                                 [1]  Past Surgical History:  Procedure Laterality Date   • COLONOSCOPY     • COLONOSCOPY W/ POLYPECTOMY  2004   • EYE SURGERY Bilateral 2020    Cataract and laser on both eyes   • HAND SURGERY Bilateral 2008    DUPUYTRENS CONTRACTURE SURGICAL RELEASE   • INSERTION OF FIDUCIAL MARKER (TRANSRECTAL ULTRASOUND GUIDANCE) N/A 2024    Procedure: INSERTION OF FIDUCIAL MARKER ,SPACEOAR;  Surgeon: Judson Deluca MD;  Location:  MAIN OR;  Service: Urology   • OR BX PROSTATE STRTCTC SATURATION SAMPLING IMG GID N/A 2024    Procedure: TRANSPERINEAL ULTRASOUND GUIDED BIOPSY PROSTATE;  Surgeon: Melo Chris MD;  Location: [facility];  Service: Urology   [2]  Family History  Problem Relation Name Age of Onset   • Coronary artery disease Mother Alma Isadora    • Diabetes Mother Alma Isadora    • Cirrhosis Father     • Diabetes type II Family          multiple relatives   • Heart disease Family          ASCVD   [3]  Social History  Tobacco Use   Smoking Status Some Days   • Current packs/day: 0.00   • Types: Cigars, Cigarettes   • Start date:    • Last attempt to quit: 1980   • Years since quittin.4   • Passive exposure: Current   Smokeless Tobacco Never   Tobacco Comments    Cigar once and a while when I play golf; on average I play two or three times a week   [4]    Current Outpatient Medications:   •  rosuvastatin (CRESTOR) 20 MG tablet, Take 1 tablet (20 mg total) by mouth daily, Disp:  90 tablet, Rfl: 1  •  escitalopram (LEXAPRO) 5 mg tablet, Take 1 tablet (5 mg total) by mouth daily, Disp: 90 tablet, Rfl: 0[5]  No Known Allergies

## 2025-06-10 NOTE — PROGRESS NOTES
Fitness Summary and Recommendations: Ezio scored at the 35% on his body composition assessment with a bodyfat % of 26%. Ezio scored in the above average range for flexibility with a sit & reach score of 25 cm. His Muscle Strength/Endurance scores placed him at the 50% (lower body) and 75% (upper body) with a chair stand score of 15 and arm curl score of 22 respectively. Ezio's Cardiovascular Score of 24.7 placed him at the 20%. Overall, Ezio would be considered to have an average fitness level with a 51% score. Continued emphasis on regular exercise and sound nutrition practices will enable Ezio to reach his optimal level of fitness.

## 2025-06-10 NOTE — PROGRESS NOTES
Nutritional Summary and Recommendations:    Patient Nutrition-Oriented Medical/Diet Hx  Yousif presents today to [facility] for nutrition assessment and counseling.  Per 24-hour dietary recall Yousif typically eats 3 meals/day and snacks at night. Dietary goals include to stop nighttime snacking, achieve weight loss, and reduce sodium intake. Discussion focused on increasing protein and fiber intake, reducing salt intake, and strategies for weight management. Labs reviewed.            Labs:  A1C 5.4  Total Cholesterol 201  Triglycerides 127  HDL 55    Vitamin D 30.9    Anthropometrics:     Ht: 5 ft 10 in Wt: 203.8 lb   BMI: 29.2      BMR: 1738 kcals  Fat%: 26 % Acceptable Range: 13-25%     Fat Mass: 53 lb FFM: 150.8 lb  TBW: 110.4 lb      Nutrition Diagnosis:  Overweight ( BMI 25-29.9 ) r/t energy intake > energy output over time as evidenced by BMI 29    Nutrition Recommendations:    Incorporate a protein with every meal and snack to maintain adequate dietary intake.  Read food labels for sodium and fiber content. Aim for no more than 2300 mg of Sodium daily. Choose whole grains with >/=3 grams of fiber per serving.  Achieve gradual weight loss of 1-2 lb per week through dietary and exercise changes as discussed today.  Contact RD with any questions or concerns.     Nutrition Education     Low Sodium Diet and Healthy Snacking       Perceived Comprehension:   Good    Expected Compliance:  Good

## 2025-06-10 NOTE — PROGRESS NOTES
HEART AND VASCULAR SUMMARY    1. Lipids: Total 201, , HDL 55,     2. ECG: Normal Sinus Rhythm.     3. Echocardiogram: Normal left ventricular function. No valvular disease.     4. Stress ECHO: Normal EKG with exercise. Blood pressure response was hypertensive to exercise.     5. Vascular testing: Carotids Bilateral carotid atherosclerosis, Abdominal Aorta Normal size    6. Coronary Calcium CT: 1603    7. The 10-year ASCVD risk score (Tasha MCGRATH, et al., 2019) is: 23.3%    Values used to calculate the score:      Age: 74 years      Clincally relevant sex: Male      Is Non- : No      Diabetic: No      Tobacco smoker: Yes      Systolic Blood Pressure: 126 mmHg      Is BP treated: No      HDL Cholesterol: 55 mg/dL      Total Cholesterol: 201 mg/dL     8. HSCRP:   Lab Results   Component Value Date    HSCRP 0.99 06/10/2025       9. Vitals:   Vitals:    06/10/25 0757   BP: 126/84   Pulse: 73   Resp: 14   SpO2: 98%       Impression and Recommendations:    No evidence of coronary artery disease  Normal left ventricular function.  Recommend outpatient monitoring of blood pressure.  Recommend a goal LDL closer to 70 given elevated calcium score.

## 2025-06-10 NOTE — ASSESSMENT & PLAN NOTE
Your cholesterol level today is mildly elevated at 201.  Your bad cholesterol/LDL is also elevated at 121 (should be below 100).  Also, your vascular studies revealed plaque in your carotid arteries.  I would recommend working on lowering your LDL cholesterol to less than 70.  I think the best way to achieve this would be to switch your statin to rosuvastatin 20 mg daily.  I would also recommend recommend continuing to work on a heart healthy diet low in animal and dairy fats.  Also, I would recommend continuing your regular exercise program.  Orders:  •  rosuvastatin (CRESTOR) 20 MG tablet; Take 1 tablet (20 mg total) by mouth daily

## 2025-06-10 NOTE — ASSESSMENT & PLAN NOTE
Your iliac arteries show a slightly widened dimension of 1.5 cm.  This is called ectasia.  I would recommend repeating vascular imaging again in 1 year.

## 2025-06-10 NOTE — PROGRESS NOTES
"  Your [facility] Dermatologist's Name: SUSHIL Parikh    Skin Screening Exam:    A chaperone was present throughout the entire encounter.      SKIN:  FULL ORGAN SYSTEM EXAM   Hair, Scalp, Ears, Face Normal except as noted below in Assessment   Neck, Cervical Chain Nodes Normal except as noted below in Assessment   Right Arm/Hand/Fingers Normal except as noted below in Assessment   Left Arm/Hand/Fingers Normal except as noted below in Assessment   Chest/Breasts/Axillae Did the patient specifically refuse to have the areas \"under-the-bra\" examined by the Dermatologist? YES  Examined areas normal except as noted below in Assessment   Abdomen, Umbilicus Normal except as noted below in Assessment   Back/Spine Normal except as noted below in Assessment   Groin/Genitalia/Buttocks Did the patient specifically refuse to have the areas \"under-the-underwear\" examined by the Dermatologist? YES  Examined areas normal except as noted below in Assessment   Right Leg, Foot, Toes Normal except as noted below in Assessment   Left Leg, Foot, Toes Normal except as noted below in Assessment        Assessment and Plan by Diagnosis: Patient established with Dr. Lopez.  Denies any personal skin cancer history.    Use a moisturizer + sunscreen \"combo\" product such as Neutrogena Daily Defense SPF 50+ or CeraVe AM at least three times a day.  Follow up as scheduled with Dr. Lopez for treatment of actinic keratosis  Continue routine skin checks    ACTINIC KERATOSIS    Physical Exam:  Anatomic Location: scalp, bilateral forearms  Morphologic Description:  Scaly pink papules  Pertinent Positives:  Pertinent Negatives:    Additional History of Present Condition:  Present on exam.  Patient states he has received cryotherapy in the past.  Follows with Dr. Lopez every 6 months.  Resides in [place] during the winter.    History of bleeding from this lesion? NO  History of pain, tenderness, and/or itching within this lesion? " NO  Personal history of skin cancer? NO  Family history of skin cancer? NO  Previous treatment to the same site? YES, cryotherapy    Plan:  We discussed the pre-cancerous nature of the condition. Actinic keratosis is found on sun-damaged skin and there is small risk that the condition could turn into a skin cancer called squamous cell carcinoma. There is no risk of actinic keratosis turning into melanoma.  Proliferative actinic keratosis tends to be resistant against standard treatments, including topical therapies and cryotherapy. It has a tendency to develop into infiltrative squamous cell carcinoma. Excision may be considered.  We discussed sun protection measures, including using sunscreen with an SPF 50+ year round, avoiding the sun, and wearing protective clothing such as long sleeves and pants when out in the sun.  Continue to monitor clinically for signs of recurrence. Discussed with patient the importance of keeping up to date with full body skin exams.  Patient will follow up with Dr. Lopez later this month for re-evaluation and treatment with cryotherapy and/or Efudex     PROCEDURES PERFORMED TODAY ASSOCIATED WITH THIS CONDITION:          NONE      MEDICAL DECISION MAKING  Treatment Goal:  Resolution of this ACUTE, UNCOMPLICATED condition.       A recent or new short-term problem for which treatment is CONSIDERED OR INITIATED. There is little to no risk of mortality with treatment, and full recovery without functional impairment is expected.  Diagnosis or treatment significantly limited by the following social determinants of health:  NONE IDENTIFIED          DILATED PORE OF MAGDA  Physical Exam:  Anatomic Location Affected:  mid upper back  Morphological Description:  solitary enlarged pore with keratin plug  Pertinent Positives:  Pertinent Negatives:    Additional History of Present Condition:  Present on exam.  Patient asymptomatic.     Assessment and Plan:  Based on a thorough discussion of this  condition and the management approach to it (including a comprehensive discussion of the known risks, side effects and potential benefits of treatment), the patient (family) agrees to implement the following specific plan:  Reassured benign  Will continue monitoring with Dr. Lopez

## 2025-06-10 NOTE — ASSESSMENT & PLAN NOTE
Your weight today is 204 pounds.  Your body mass index is 29.27.  This puts you in the overweight category.  I would recommend a 15 pound weight loss goal.  Hopefully the information given to you today by our nutritionist and fitness expert will be helpful for you to achieve this goal.

## 2025-06-10 NOTE — ASSESSMENT & PLAN NOTE
"You have a history of [place] 4+3=7 prostate cancer diagnosed in February 2024.  You completed radiation therapy as well as androgen deprivation therapy.  Your PSA today was slightly elevated at 1.129.  This may be due to a phenomenon known as \"Lupron flare\", which is a temporary increase of PSA following androgen deprivation therapy.  I would recommend continue regular follow-up with your urologist as directed.     "

## 2025-06-10 NOTE — ASSESSMENT & PLAN NOTE
Your ascending thoracic aorta was mildly dilated at 43 mm.  This is called ectasia.  I would recommend repeating CT scanning of chest in 1 year to ensure stability.

## 2025-06-10 NOTE — PROGRESS NOTES
Hearing Assessment Summary:   Hearing Screening    250Hz 500Hz 1000Hz 2000Hz 4000Hz 6000Hz 8000Hz   Right ear 10 15 20 20 35 55 70   Left ear 15 15 15 20 35 50 65   Comments: HEARING EVALUATION    Name:  Ezio Muir  :  1950  Age:  74 y.o.   MRN:  [id number]  Date of Evaluation: 06/10/25     History: [employer] Exam  Reason for visit: Ezio Muir is being seen today for an evaluation of hearing as part of an [employer] examination.  patient does not report any difficulty hearing. He does note an occasional ringing in both ears.       EVALUATION:    Otoscopic Evaluation:   Right Ear: Clear and healthy ear canal and tympanic membrane   Left Ear: Clear and healthy ear canal and tympanic membrane    Tympanometry:   Right: Type As - reduced compliance   Left: Type As - reduced compliance    Audiogram Results:  Pure tone testing revealed a Normal sloping to Moderately severe sensorineural hearing loss (SNHL) hearing loss bilaterally. SRT and PTA are in agreement indicating fair test reliability (false positives after reinstruction). Word recognition scores were excellent bilaterally.       *see attached audiogram      RECOMMENDATIONS:  Annual hearing eval and Return to [facility]. for F/U    PATIENT EDUCATION:   Discussed results and recommendations with patient.  Questions were addressed and the patient was encouraged to contact our department should concerns arise.      Tello Mckenna., CCC-A  Clinical Audiologist'      Vision Screening    Right eye Left eye Both eyes   Without correction      With correction 20/13 20/15 20/13

## 2025-06-10 NOTE — ASSESSMENT & PLAN NOTE
Your vascular screening today revealed a small amount of plaque in both your internal carotid arteries.  I would recommend switching to a more powerful statin (rosuvastatin 20).  I would also recommend repeating the scan again in 1 year.

## 2025-06-11 LAB — LPA SERPL-SCNC: 40.6 NMOL/L

## 2025-06-12 ENCOUNTER — RESULTS FOLLOW-UP (OUTPATIENT)
Dept: FAMILY MEDICINE CLINIC | Facility: CLINIC | Age: 75
End: 2025-06-12

## 2025-06-17 ENCOUNTER — OFFICE VISIT (OUTPATIENT)
Dept: UROLOGY | Facility: CLINIC | Age: 75
End: 2025-06-17
Payer: MEDICARE

## 2025-06-17 VITALS
HEART RATE: 74 BPM | WEIGHT: 210 LBS | DIASTOLIC BLOOD PRESSURE: 80 MMHG | OXYGEN SATURATION: 98 % | HEIGHT: 70 IN | BODY MASS INDEX: 30.06 KG/M2 | SYSTOLIC BLOOD PRESSURE: 172 MMHG

## 2025-06-17 DIAGNOSIS — C61 PROSTATE CANCER (HCC): Primary | ICD-10-CM

## 2025-06-17 PROCEDURE — 99213 OFFICE O/P EST LOW 20 MIN: CPT

## 2025-06-17 NOTE — ASSESSMENT & PLAN NOTE
-Grade Group 3 Prostate cancer  -Date of diagnosis February 2024 (transperineal biopsy)  -Prebiopsy MRI September 2023 no radiographically detectable disease  -Pathology reveals [place] 4+3 equal 7 disease in 1 core and 4 additional positive cores of 3+4 equal 7  -Completed radiation therapy on 10/1/24 along with 6 months of ADT.  -Pretreatment PSA is 4.76 (10/19/23). PSA now 1.129 (6/10/25). PSA was 0.451 (5/1/25). We did discuss, that is it not abnormal for PSA to increase some time after completion of radiation, however, we do anticipate PSA to level out. I do want him to repeat PSA next month, to trend result. If PSA is stable, we will plan on repeating in 6 months and then in 1 year PTV.   -Patient following with rad onc and is scheduled to see them in May 2026.   Orders:    PSA Total, Diagnostic; Future    PSA Total, Diagnostic; Future    PSA Total, Diagnostic; Future

## 2025-06-17 NOTE — PROGRESS NOTES
Name: Ezio Muir      : 1950      MRN: [id number]  Encounter Provider: SUSHIL Isaac  Encounter Date: 2025   Encounter department: [facility]  :  Assessment & Plan  Prostate cancer (HCC)  -Grade Group 3 Prostate cancer  -Date of diagnosis 2024 (transperineal biopsy)  -Prebiopsy MRI 2023 no radiographically detectable disease  -Pathology reveals [place] 4+3 equal 7 disease in 1 core and 4 additional positive cores of 3+4 equal 7  -Completed radiation therapy on 10/1/24 along with 6 months of ADT.  -Pretreatment PSA is 4.76 (10/19/23). PSA now 1.129 (6/10/25). PSA was 0.451 (25). We did discuss, that is it not abnormal for PSA to increase some time after completion of radiation, however, we do anticipate PSA to level out. I do want him to repeat PSA next month, to trend result. If PSA is stable, we will plan on repeating in 6 months and then in 1 year PTV.   -Patient following with rad onc and is scheduled to see them in May 2026.   Orders:    PSA Total, Diagnostic; Future    PSA Total, Diagnostic; Future    PSA Total, Diagnostic; Future    -All questions addressed. Please do not hesitate to reach out with further questions or concerns.     History of Present Illness   Ezio Muir is a 74 y.o. male who presents here with history of Grade group 3 prostate cancer presenting today for follow-up. He is doing well. He denies voiding complaints. He denies dysuria, flank pain, gross hematuria. His PSA is 1.129 (6/10/25).       Review of Systems   Constitutional:  Negative for activity change, chills, fatigue, fever and unexpected weight change.   HENT:  Negative for congestion, rhinorrhea and sore throat.    Eyes:  Negative for photophobia, redness and visual disturbance.   Respiratory:  Negative for cough, shortness of breath and wheezing.    Cardiovascular:  Negative for chest pain, palpitations and leg swelling.   Gastrointestinal:  Negative for  "abdominal pain, diarrhea, nausea and vomiting.   Genitourinary:  Negative for difficulty urinating, dysuria, flank pain, frequency, hematuria and urgency.   Neurological:  Negative for weakness, light-headedness and headaches.          Objective   BP (!) 172/80 (BP Location: Left arm, Patient Position: Sitting, Cuff Size: Adult)   Pulse 74   Ht 5' 10\" (1.778 m)   Wt 95.3 kg (210 lb)   SpO2 98%   BMI 30.13 kg/m²     Physical Exam  Vitals reviewed.   Constitutional:       General: He is not in acute distress.     Appearance: He is well-developed.   HENT:      Head: Normocephalic and atraumatic.     Eyes:      Conjunctiva/sclera: Conjunctivae normal.     Pulmonary:      Effort: Pulmonary effort is normal. No respiratory distress.     Neurological:      Mental Status: He is alert and oriented to person, place, and time.     Psychiatric:         Mood and Affect: Mood normal.          Results   Lab Results   Component Value Date    PSA 1.129 06/10/2025    PSA 0.451 05/01/2025    PSA 0.075 12/09/2024     Lab Results   Component Value Date    CALCIUM 9.1 06/10/2025    K 4.5 06/10/2025    CO2 28 06/10/2025     06/10/2025    BUN 17 06/10/2025    CREATININE 0.89 06/10/2025     Lab Results   Component Value Date    WBC 4.42 06/10/2025    HGB 15.3 06/10/2025    HCT 44.3 06/10/2025    MCV 93 06/10/2025     06/10/2025       Office Urine Dip  No results found for this or any previous visit (from the past hour).      "

## 2025-06-30 ENCOUNTER — PROCEDURE VISIT (OUTPATIENT)
Dept: OBGYN CLINIC | Facility: HOSPITAL | Age: 75
End: 2025-06-30
Payer: MEDICARE

## 2025-06-30 VITALS — BODY MASS INDEX: 30.06 KG/M2 | WEIGHT: 210 LBS | HEIGHT: 70 IN

## 2025-06-30 DIAGNOSIS — M72.0 DUPUYTREN'S CONTRACTURE OF RIGHT HAND: Primary | ICD-10-CM

## 2025-06-30 PROCEDURE — 20527 NJX NZM PALMAR FASCIAL CORD: CPT | Performed by: SURGERY

## 2025-06-30 RX ADMIN — LIDOCAINE HYDROCHLORIDE 4 ML: 10 INJECTION, SOLUTION INFILTRATION; PERINEURAL at 09:00

## 2025-06-30 NOTE — PROGRESS NOTES
Josie Dang M.D.  Attending, Orthopaedic Surgery  Hand, Wrist, and Elbow Surgery  [facility]      ORTHOPAEDIC HAND, WRIST, AND ELBOW OFFICE  VISIT       ASSESSMENT/PLAN:        Assessment & Plan  Dupuytren's contracture of right hand  Patient here for xiaflex injection to the right small finger  Risks, benefits, and alternatives to Xiaflex injection were reviewed with the patient and cord was injected without issue under local anesthesia. Post injection protocol and expectations were discussed including pain, swelling, bruising, and lymphadenopathy. Patient may also notice some popping in the hand over the next few days which is normal, this is the cord releasing. We will see the patient back on Wednesday for manipulation and referral to OT for night time extension brace.       Orders:    Hand/upper extremity injection             The patient verbalized understanding of exam findings and treatment plan. We engaged in the shared decision-making process and treatment options were discussed at length with the patient. Surgical and conservative management discussed today along with risks and benefits.      Follow Up:  Return in about 2 days (around 7/2/2025).    To Do Next Visit:  Re-evaluation of current issue      General Discussions:  Xiaflex: The process of receiving Xiaflex injection was discussed with the patient today. This includes 3-4 injections of the collagenase material in the area of the affected cord. The hand will then be bandaged for 2-3 consecutive days, at which time the patient will return to the office for manipulation of the affected finger. Risks, benefits and alternatives of Xiaflex injections were discussed with the patient today. Risks include bleeding, infection, pain, swelling, bruising, itching, breaks in the skin, redness/warmth of the skin, allergic reaction, tendon rupture, ligament damage, and nerve/vessel injury. Patient agrees to proceed with the injection.  Authorization process will be initiated with patient to follow up once this is achieved.      ____________________________________________________________________________________________________________________________________________      CHIEF COMPLAINT:  Chief Complaint   Patient presents with    Injections     XIAFLEX INJECTION       SUBJECTIVE:  Ezio Muir is a 74 y.o. year old RHD male who presents for Xiaflex injection for the right small finger dupuytren's contracture. He has hx of surgery on this finger years ago with recurrence.      I have personally reviewed all the relevant PMH, PSH, SH, FH, Medications and allergies      PAST MEDICAL HISTORY:  Past Medical History[1]    PAST SURGICAL HISTORY:  Past Surgical History[2]    FAMILY HISTORY:  Family History[3]    SOCIAL HISTORY:  Social History[4]    MEDICATIONS:  Current Medications[5]    ALLERGIES:  Allergies[6]        REVIEW OF SYSTEMS:  Review of Systems   Constitutional:  Negative for chills and fever.   HENT:  Negative for ear pain and sore throat.    Eyes:  Negative for pain and visual disturbance.   Respiratory:  Negative for cough and shortness of breath.    Cardiovascular:  Negative for chest pain and palpitations.   Gastrointestinal:  Negative for abdominal pain and vomiting.   Genitourinary:  Negative for dysuria and hematuria.   Musculoskeletal:  Negative for arthralgias and back pain.   Skin:  Negative for color change and rash.   Neurological:  Negative for seizures and syncope.   All other systems reviewed and are negative.      VITALS:  There were no vitals filed for this visit.    LABS:      _____________________________________________________  PHYSICAL EXAMINATION:  General: well developed and well nourished, alert, oriented times 3, and appears comfortable  Psychiatric: Normal  HEENT: Normocephalic, Atraumatic Trachea Midline, No torticollis  Pulmonary: No audible wheezing or respiratory distress   Abdomen/GI: Non tender, non  "distended   Cardiovascular: No pitting edema, 2+ radial pulse   Skin: No masses, erythema, lacerations, fluctation, ulcerations  Neurovascular: Sensation Intact to the Median, Ulnar, Radial Nerve, Motor Intact to the Median, Ulnar, Radial Nerve, and Pulses Intact  Musculoskeletal: Normal, except as noted in detailed exam and in HPI.      MUSCULOSKELETAL EXAMINATION:  Right hand:   Right small finger PIP contracture or 40 degrees, MP contracture 45 degrees, palpable pre tendinous cord into a spiral cod, + table top test   ___________________________________________________  STUDIES REVIEWED:  None     LABS REVIEWED:    HgA1c:   Lab Results   Component Value Date    HGBA1C 5.4 06/10/2025     BMP:   Lab Results   Component Value Date    CALCIUM 9.1 06/10/2025    K 4.5 06/10/2025    CO2 28 06/10/2025     06/10/2025    BUN 17 06/10/2025    CREATININE 0.89 06/10/2025               PROCEDURES PERFORMED:  Hand/upper extremity injection: R small finger    Universal Protocol:  procedure performed by consultantConsent: Verbal consent obtained  Risks and benefits: risks, benefits and alternatives were discussed  Consent given by: patient  Time out: Immediately prior to procedure a \"time out\" was called to verify the correct patient, procedure, equipment, support staff and site/side marked as required.  Patient understanding: patient states understanding of the procedure being performed  Site marked: the operative site was marked  Required items: required blood products, implants, devices, and special equipment available  Patient identity confirmed: verbally with patient  Supporting Documentation  Indications: pain and therapeutic   Procedure Details  Condition:Dupuytren's contracture Dupuytren's Contracture Procedure: Dupuytren's contracture injectionSite: R small finger   Preparation: Patient was prepped and draped in the usual sterile fashion  Needle size: 25 G  Ultrasound guidance: no  Approach: volar  Medications " administered: 4 mL lidocaine 1 %; 0.58 mg collagenase clostridium histolyticum 0.9 MG  Patient tolerance: patient tolerated the procedure well with no immediate complications  Dressing:  Sterile dressing applied         -    _____________________________________________________      Scribe Attestation      I,:  Michelle Stock PA-C am acting as a scribe while in the presence of the attending physician.:       I,:  Josie Dang MD personally performed the services described in this documentation    as scribed in my presence.:                          [1]   Past Medical History:  Diagnosis Date    Anxiety     Dupuytren contracture     bilateral    Elevated PSA 06012023    Took PSA test 3 years ago, with a result of 2.7. Took snother tedt approximately June 1, 2023 with trsult of 4.2    Hyperlipidemia     Prostate cancer (HCC)    [2]   Past Surgical History:  Procedure Laterality Date    COLONOSCOPY  2022    COLONOSCOPY W/ POLYPECTOMY  09/23/2004    EYE SURGERY Bilateral November 2020    Cataract and laser on both eyes    HAND SURGERY Bilateral 12/2008    DUPUYTRENS CONTRACTURE SURGICAL RELEASE    INSERTION OF FIDUCIAL MARKER (TRANSRECTAL ULTRASOUND GUIDANCE) N/A 07/25/2024    Procedure: INSERTION OF FIDUCIAL MARKER ,SPACEOAR;  Surgeon: Judson Deluca MD;  Location:  MAIN OR;  Service: Urology    MO BX PROSTATE STRTCTC SATURATION SAMPLING IMG GID N/A 02/23/2024    Procedure: TRANSPERINEAL ULTRASOUND GUIDED BIOPSY PROSTATE;  Surgeon: Melo Chris MD;  Location: [facility];  Service: Urology   [3]   Family History  Problem Relation Name Age of Onset    Coronary artery disease Mother Alma Isadora     Diabetes Mother Alma Isadora     Cirrhosis Father      Diabetes type II Family          multiple relatives    Heart disease Family          ASCVD   [4]   Social History  Tobacco Use    Smoking status: Some Days     Current packs/day: 0.00     Types: Cigars, Cigarettes     Start date: 1980     Last attempt to  quit: 1980     Years since quittin.5     Passive exposure: Current    Smokeless tobacco: Never    Tobacco comments:     Cigar once and a while when I play golf; on average I play two or three times a week   Vaping Use    Vaping status: Never Used   Substance Use Topics    Alcohol use: Yes     Alcohol/week: 7.0 standard drinks of alcohol     Types: 7 Standard drinks or equivalent per week     Comment: With dinner    Drug use: Never   [5]   Current Outpatient Medications:     escitalopram (LEXAPRO) 5 mg tablet, Take 1 tablet (5 mg total) by mouth daily, Disp: 90 tablet, Rfl: 0    rosuvastatin (CRESTOR) 20 MG tablet, Take 1 tablet (20 mg total) by mouth daily, Disp: 90 tablet, Rfl: 1  [6] No Known Allergies

## 2025-07-01 RX ORDER — LIDOCAINE HYDROCHLORIDE 10 MG/ML
4 INJECTION, SOLUTION INFILTRATION; PERINEURAL
Status: COMPLETED | OUTPATIENT
Start: 2025-06-30 | End: 2025-06-30

## 2025-07-02 ENCOUNTER — PROCEDURE VISIT (OUTPATIENT)
Dept: OBGYN CLINIC | Facility: CLINIC | Age: 75
End: 2025-07-02
Payer: MEDICARE

## 2025-07-02 VITALS — HEIGHT: 70 IN | BODY MASS INDEX: 30.06 KG/M2 | WEIGHT: 210 LBS

## 2025-07-02 DIAGNOSIS — M72.0 DUPUYTREN'S CONTRACTURE OF RIGHT HAND: Primary | ICD-10-CM

## 2025-07-02 PROCEDURE — 26341 MANIPULAT PALM CORD POST INJ: CPT | Performed by: SURGERY

## 2025-07-02 RX ORDER — LIDOCAINE HYDROCHLORIDE 10 MG/ML
4 INJECTION, SOLUTION INFILTRATION; PERINEURAL
Status: COMPLETED | OUTPATIENT
Start: 2025-07-02 | End: 2025-07-02

## 2025-07-02 RX ADMIN — LIDOCAINE HYDROCHLORIDE 4 ML: 10 INJECTION, SOLUTION INFILTRATION; PERINEURAL at 09:00

## 2025-07-02 NOTE — PROGRESS NOTES
Josie Dang M.D.  Attending, Orthopaedic Surgery  Hand, Wrist, and Elbow Surgery  [facility]      ORTHOPAEDIC HAND, WRIST, AND ELBOW OFFICE  VISIT       ASSESSMENT/PLAN:        Assessment & Plan  Dupuytren's contracture of right hand  Patient underwent Xiaflex manipulation today. Following manipulation he was able to get to about 20 at the MP and 25 at the PIP. He has some baseline crookedness to the small finger and the DIP does hyperextend, but he was actively able to flex the digit at all joints throughout the entire procedure  He did sustain about a 1 cm skin tear after the manipulation, this was dressed with adaptic and dry dressings, change dressings daily. Ok to wash hands and shower but avoid soaking the hand or any topical agents  Will hold off on custom splint until he is seen in the office next week      Orders:    Hand/upper extremity injection: R small finger    Ambulatory Referral to PT/OT Hand Therapy; Future             The patient verbalized understanding of exam findings and treatment plan. We engaged in the shared decision-making process and treatment options were discussed at length with the patient. Surgical and conservative management discussed today along with risks and benefits.      Follow Up:  Return in about 1 week (around 7/9/2025) for Recheck.    To Do Next Visit:  Re-evaluation of current issue      General Discussions:  Xiaflex: The process of receiving Xiaflex injection was discussed with the patient today. This includes 3-4 injections of the collagenase material in the area of the affected cord. The hand will then be bandaged for 2-3 consecutive days, at which time the patient will return to the office for manipulation of the affected finger. Risks, benefits and alternatives of Xiaflex injections were discussed with the patient today. Risks include bleeding, infection, pain, swelling, bruising, itching, breaks in the skin, redness/warmth of the skin,  allergic reaction, tendon rupture, ligament damage, and nerve/vessel injury. Patient agrees to proceed with the injection. Authorization process will be initiated with patient to follow up once this is achieved.      ____________________________________________________________________________________________________________________________________________      CHIEF COMPLAINT:  Chief Complaint   Patient presents with    Injections     XIAFLEX        SUBJECTIVE:  Ezio Muir is a 74 y.o. year old RHD male who presents for Xiaflex manipulation for the right small finger dupuytren's contracture. He has some soreness at the hand and has developed bruising over the ulnar hand and small finger.       I have personally reviewed all the relevant PMH, PSH, SH, FH, Medications and allergies      PAST MEDICAL HISTORY:  Past Medical History[1]    PAST SURGICAL HISTORY:  Past Surgical History[2]    FAMILY HISTORY:  Family History[3]    SOCIAL HISTORY:  Social History[4]    MEDICATIONS:  Current Medications[5]    ALLERGIES:  Allergies[6]        REVIEW OF SYSTEMS:  Review of Systems   Constitutional:  Negative for chills and fever.   HENT:  Negative for ear pain and sore throat.    Eyes:  Negative for pain and visual disturbance.   Respiratory:  Negative for cough and shortness of breath.    Cardiovascular:  Negative for chest pain and palpitations.   Gastrointestinal:  Negative for abdominal pain and vomiting.   Genitourinary:  Negative for dysuria and hematuria.   Musculoskeletal:  Positive for joint swelling. Negative for arthralgias and back pain.   Skin:  Negative for color change and rash.   Neurological:  Negative for seizures and syncope.   All other systems reviewed and are negative.      VITALS:  There were no vitals filed for this visit.    LABS:      _____________________________________________________  PHYSICAL EXAMINATION:  General: well developed and well nourished, alert, oriented times 3, and appears  "comfortable  Psychiatric: Normal  HEENT: Normocephalic, Atraumatic Trachea Midline, No torticollis  Pulmonary: No audible wheezing or respiratory distress   Abdomen/GI: Non tender, non distended   Cardiovascular: No pitting edema, 2+ radial pulse   Skin: No masses, erythema, lacerations, fluctation, ulcerations  Neurovascular: Sensation Intact to the Median, Ulnar, Radial Nerve, Motor Intact to the Median, Ulnar, Radial Nerve, and Pulses Intact  Musculoskeletal: Normal, except as noted in detailed exam and in HPI.      MUSCULOSKELETAL EXAMINATION:  Right hand:   Edema and ecchymosis over the ulnar side of the hand  Cord is soft    Post manipulation:   MP to about 20, PIP to about 25  Patient did sustain about a 1 cm skin tear volarly at the PIP   ___________________________________________________  STUDIES REVIEWED:  None     LABS REVIEWED:    HgA1c:   Lab Results   Component Value Date    HGBA1C 5.4 06/10/2025     BMP:   Lab Results   Component Value Date    CALCIUM 9.1 06/10/2025    K 4.5 06/10/2025    CO2 28 06/10/2025     06/10/2025    BUN 17 06/10/2025    CREATININE 0.89 06/10/2025               PROCEDURES PERFORMED:  Hand/upper extremity injection: R small finger    Universal Protocol:  procedure performed by consultantConsent: Verbal consent obtained  Risks and benefits: risks, benefits and alternatives were discussed  Consent given by: patient  Time out: Immediately prior to procedure a \"time out\" was called to verify the correct patient, procedure, equipment, support staff and site/side marked as required.  Patient understanding: patient states understanding of the procedure being performed  Site marked: the operative site was marked  Required items: required blood products, implants, devices, and special equipment available  Patient identity confirmed: verbally with patient  Supporting Documentation  Indications: pain and therapeutic   Procedure Details  Condition:Dupuytren's contracture Dupuytren's " Contracture Procedure: Dupuytren's contracture manipulationSite: R small finger   Preparation: Patient was prepped and draped in the usual sterile fashion  Needle size: 25 G  Ultrasound guidance: no  Approach: volar  Medications administered: 4 mL lidocaine 1 %  Patient tolerance of procedure: patient tolerated procedure well, he did sustain a skin tear at the volar PIP.  Dressing:  Sterile dressing applied         -    _____________________________________________________      Scribe Attestation      I,:  Michelle Stock PA-C am acting as a scribe while in the presence of the attending physician.:       I,:  Josie Dang MD personally performed the services described in this documentation    as scribed in my presence.:                      [1]   Past Medical History:  Diagnosis Date    Anxiety     Dupuytren contracture     bilateral    Elevated PSA [id number]    Took PSA test 3 years ago, with a result of 2.7. Took snother tedt approximately June 1, 2023 with trsult of 4.2    Hyperlipidemia     Prostate cancer (HCC)    [2]   Past Surgical History:  Procedure Laterality Date    COLONOSCOPY  2022    COLONOSCOPY W/ POLYPECTOMY  09/23/2004    EYE SURGERY Bilateral November 2020    Cataract and laser on both eyes    HAND SURGERY Bilateral 12/2008    DUPUYTRENS CONTRACTURE SURGICAL RELEASE    INSERTION OF FIDUCIAL MARKER (TRANSRECTAL ULTRASOUND GUIDANCE) N/A 07/25/2024    Procedure: INSERTION OF FIDUCIAL MARKER ,SPACEOAR;  Surgeon: Judson Deluca MD;  Location:  MAIN OR;  Service: Urology    [place] BX PROSTATE STRTCTC SATURATION SAMPLING IMG GID N/A 02/23/2024    Procedure: TRANSPERINEAL ULTRASOUND GUIDED BIOPSY PROSTATE;  Surgeon: Melo Chris MD;  [facility];  Service: Urology   [3]   Family History  Problem Relation Name Age of Onset    Coronary artery disease Mother Alma Isadora     Diabetes Mother Alma Isadora     Cirrhosis Father      Diabetes type II Family          multiple relatives    Heart  disease Family          ASCVD   [4]   Social History  Tobacco Use    Smoking status: Some Days     Current packs/day: 0.00     Types: Cigars, Cigarettes     Start date:      Last attempt to quit: 1980     Years since quittin.5     Passive exposure: Current    Smokeless tobacco: Never    Tobacco comments:     Cigar once and a while when I play golf; on average I play two or three times a week   Vaping Use    Vaping status: Never Used   Substance Use Topics    Alcohol use: Yes     Alcohol/week: 7.0 standard drinks of alcohol     Types: 7 Standard drinks or equivalent per week     Comment: With dinner    Drug use: Never   [5]   Current Outpatient Medications:     escitalopram (LEXAPRO) 5 mg tablet, Take 1 tablet (5 mg total) by mouth daily, Disp: 90 tablet, Rfl: 0    rosuvastatin (CRESTOR) 20 MG tablet, Take 1 tablet (20 mg total) by mouth daily, Disp: 90 tablet, Rfl: 1  [6] No Known Allergies

## 2025-07-02 NOTE — PROGRESS NOTES
"ASSESSMENT/PLAN:      Assessment & Plan      ***    The patient verbalized understanding of exam findings and treatment plan. We engaged in the shared decision-making process and treatment options were discussed at length with the patient. Surgical and conservative management discussed today along with risks and benefits.    There are no diagnoses linked to this encounter.      {[facility] Discussions:53747}    Follow Up:  No follow-ups on file.      To Do Next Visit:  {To do next visit:51576::\"Re-evaluation of current issue\"}    ____________________________________________________________________________________________________________________________________________      CHIEF COMPLAINT:  No chief complaint on file.      SUBJECTIVE:  Ezio Muir is a 74 y.o. year old ***HD male who presents ***        I have personally reviewed all the relevant PMH, PSH, SH, FH, Medications and allergies.     PAST MEDICAL HISTORY:  Past Medical History[1]    PAST SURGICAL HISTORY:  Past Surgical History[2]    FAMILY HISTORY:  Family History[3]    SOCIAL HISTORY:  Social History[4]    MEDICATIONS:  Current Medications[5]    ALLERGIES:  Allergies[6]    REVIEW OF SYSTEMS:  Review of Systems    VITALS:  There were no vitals filed for this visit.      _____________________________________________________  PHYSICAL EXAMINATION:  General: {1234:73998::\"well developed and well nourished\",\"alert\",\"oriented times 3\",\"appears comfortable\"}  Psychiatric: {Psych:15868::\"Normal\"}  HEENT: Normocephalic, Atraumatic Trachea Midline, No torticollis  Pulmonary: No audible wheezing or respiratory distress   Cardiovascular: No pitting edema, 2+ radial pulse   Abdominal/GI: abdomen non tender, non distended   Skin: {Skin exam:78512}  Neurovascular: {Nerve Exam:39724::\"Sensation Intact to the Median, Ulnar, Radial Nerve\",\"Motor Intact to the Median, Ulnar, Radial Nerve\",\"Pulses Intact\"}  Musculoskeletal: Normal, except as noted in detailed " "exam and in HPI.      MUSCULOSKELETAL EXAMINATION:      ___________________________________________________    STUDIES REVIEWED:  I have personally reviewed and interpreted  AP lateral and oblique radiographs of ***   which demonstrate ***     I have personally reviewed and interpreted  US of *** which demonstrate ***     LABS REVIEWED:    HgA1c:   Lab Results   Component Value Date    HGBA1C 5.4 06/10/2025     BMP:   Lab Results   Component Value Date    CALCIUM 9.1 06/10/2025    K 4.5 06/10/2025    CO2 28 06/10/2025     06/10/2025    BUN 17 06/10/2025    CREATININE 0.89 06/10/2025             PROCEDURES PERFORMED:  Procedures  {Was Procdoc done:54746::\"-\"}    _____________________________________________________      Scribe Attestation      I,:   am acting as a scribe while in the presence of the attending physician.:       I,:   personally performed the services described in this documentation    as scribed in my presence.:                        [1]   Past Medical History:  Diagnosis Date    Anxiety     Dupuytren contracture     bilateral    Elevated PSA [id number]    Took PSA test 3 years ago, with a result of 2.7. Took snother tedt approximately June 1, 2023 with trsult of 4.2    Hyperlipidemia     Prostate cancer (HCC)    [2]   Past Surgical History:  Procedure Laterality Date    COLONOSCOPY  2022    COLONOSCOPY W/ POLYPECTOMY  09/23/2004    EYE SURGERY Bilateral November 2020    Cataract and laser on both eyes    HAND SURGERY Bilateral 12/2008    DUPUYTRENS CONTRACTURE SURGICAL RELEASE    INSERTION OF FIDUCIAL MARKER (TRANSRECTAL ULTRASOUND GUIDANCE) N/A 07/25/2024    Procedure: INSERTION OF FIDUCIAL MARKER ,SPACEOAR;  Surgeon: Judson Deluca MD;  Location:  MAIN OR;  Service: Urology    ID BX PROSTATE STRTCTC SATURATION SAMPLING IMG GID N/A 02/23/2024    Procedure: TRANSPERINEAL ULTRASOUND GUIDED BIOPSY PROSTATE;  Surgeon: Melo Chris MD;  [facility];  Service: Urology   [3] "   Family History  Problem Relation Name Age of Onset    Coronary artery disease Mother Alma Isadora     Diabetes Mother Alma Isadora     Cirrhosis Father      Diabetes type II Family          multiple relatives    Heart disease Family          ASCVD   [4]   Social History  Tobacco Use    Smoking status: Some Days     Current packs/day: 0.00     Types: Cigars, Cigarettes     Start date:      Last attempt to quit: 1980     Years since quittin.5     Passive exposure: Current    Smokeless tobacco: Never    Tobacco comments:     Cigar once and a while when I play golf; on average I play two or three times a week   Vaping Use    Vaping status: Never Used   Substance Use Topics    Alcohol use: Yes     Alcohol/week: 7.0 standard drinks of alcohol     Types: 7 Standard drinks or equivalent per week     Comment: With dinner    Drug use: Never   [5]   Current Outpatient Medications:     escitalopram (LEXAPRO) 5 mg tablet, Take 1 tablet (5 mg total) by mouth daily, Disp: 90 tablet, Rfl: 0    rosuvastatin (CRESTOR) 20 MG tablet, Take 1 tablet (20 mg total) by mouth daily, Disp: 90 tablet, Rfl: 1  [6] No Known Allergies

## 2025-07-09 ENCOUNTER — TELEPHONE (OUTPATIENT)
Age: 75
End: 2025-07-09

## 2025-07-09 NOTE — TELEPHONE ENCOUNTER
Patients GI provider:  Dr. Heard    Number to return call: [phone]    Reason for call: Leslie from Dr George's office called to schedule an ESD with one of the advanced Drs. Please reach out to pt to schedule.    Scheduled procedure/appointment date if applicable: Apt/procedure N/A

## 2025-07-10 ENCOUNTER — OFFICE VISIT (OUTPATIENT)
Dept: OBGYN CLINIC | Facility: CLINIC | Age: 75
End: 2025-07-10

## 2025-07-10 ENCOUNTER — OFFICE VISIT (OUTPATIENT)
Dept: OCCUPATIONAL THERAPY | Facility: CLINIC | Age: 75
End: 2025-07-10
Payer: MEDICARE

## 2025-07-10 ENCOUNTER — OFFICE VISIT (OUTPATIENT)
Dept: GASTROENTEROLOGY | Facility: AMBULARY SURGERY CENTER | Age: 75
End: 2025-07-10
Payer: MEDICARE

## 2025-07-10 VITALS
SYSTOLIC BLOOD PRESSURE: 146 MMHG | HEIGHT: 70 IN | BODY MASS INDEX: 29.89 KG/M2 | WEIGHT: 208.8 LBS | HEART RATE: 75 BPM | OXYGEN SATURATION: 99 % | DIASTOLIC BLOOD PRESSURE: 76 MMHG

## 2025-07-10 VITALS — HEIGHT: 70 IN | WEIGHT: 208 LBS | BODY MASS INDEX: 29.78 KG/M2

## 2025-07-10 DIAGNOSIS — F41.1 GAD (GENERALIZED ANXIETY DISORDER): ICD-10-CM

## 2025-07-10 DIAGNOSIS — D12.6 ADENOMATOUS POLYP OF COLON, UNSPECIFIED PART OF COLON: ICD-10-CM

## 2025-07-10 DIAGNOSIS — M72.0 DUPUYTREN'S CONTRACTURE OF RIGHT HAND: Primary | ICD-10-CM

## 2025-07-10 DIAGNOSIS — K63.5 CECAL POLYP: Primary | ICD-10-CM

## 2025-07-10 PROCEDURE — L3913 HFO W/O JOINTS CF: HCPCS

## 2025-07-10 PROCEDURE — 99204 OFFICE O/P NEW MOD 45 MIN: CPT

## 2025-07-10 PROCEDURE — 99024 POSTOP FOLLOW-UP VISIT: CPT | Performed by: SURGERY

## 2025-07-10 RX ORDER — SODIUM CHLORIDE, SODIUM LACTATE, POTASSIUM CHLORIDE, CALCIUM CHLORIDE 600; 310; 30; 20 MG/100ML; MG/100ML; MG/100ML; MG/100ML
125 INJECTION, SOLUTION INTRAVENOUS CONTINUOUS
OUTPATIENT
Start: 2025-07-10

## 2025-07-10 NOTE — TELEPHONE ENCOUNTER
Reason for call:   [x] Refill   [] Prior Auth  [] Other:     Office:   [x] PCP/Provider - Joe Wylie DO / [facility]   [] Specialty/Provider -     Medication: escitalopram (LEXAPRO) 5 mg tablet     Dose/Frequency: Take 1 tablet (5 mg total) by mouth daily     Quantity: 90    Pharmacy: [facility]/pharmacy #[id number] - [place], [place] - [address] [facility]   Does the patient have enough for 3 days?   [x] Yes   [] No - Send as HP to POD    Mail Away Pharmacy   Does the patient have enough for 10 days?   [] Yes   [] No - Send as HP to POD

## 2025-07-10 NOTE — PATIENT INSTRUCTIONS
Scheduled date of colonoscopy (as of today): 7/24/2025  Physician performing colonoscopy: Dr. Heard  Location of colonoscopy: [facility]  Bowel prep reviewed with patient: Miralax/Dulcolax  Instructions reviewed with patient by: Lexi VELAZQUEZ  Clearances: N/A

## 2025-07-10 NOTE — PROGRESS NOTES
ASSESSMENT/PLAN:      Assessment & Plan  Dupuytren's contracture of right hand  Skin tear is healing well, anticipate full closure in the next week or so  Ok to get extension splint made, will start with resting posture splint and progress in extension as the wound heals  Follow up in one week for repeat evaluation and wound check                The patient verbalized understanding of exam findings and treatment plan. We engaged in the shared decision-making process and treatment options were discussed at length with the patient. Surgical and conservative management discussed today along with risks and benefits.      Follow Up:  Return in about 1 week (around 7/17/2025).      To Do Next Visit:  Re-evaluation of current issue    ____________________________________________________________________________________________________________________________________________      CHIEF COMPLAINT:  No chief complaint on file.      SUBJECTIVE:  Ezio Muir is a 74 y.o. year old RHD male who presents to the office today for follow up evaluation right small finger dupuytren's contracture. The patient underwent Xiaflex manipulation last week.         I have personally reviewed all the relevant PMH, PSH, SH, FH, Medications and allergies.     PAST MEDICAL HISTORY:  Past Medical History[1]    PAST SURGICAL HISTORY:  Past Surgical History[2]    FAMILY HISTORY:  Family History[3]    SOCIAL HISTORY:  Social History[4]    MEDICATIONS:  Current Medications[5]    ALLERGIES:  Allergies[6]    REVIEW OF SYSTEMS:  Review of Systems    VITALS:  There were no vitals filed for this visit.      _____________________________________________________  PHYSICAL EXAMINATION:  General: well developed and well nourished, alert, oriented times 3, and appears comfortable  Psychiatric: Normal  HEENT: Normocephalic, Atraumatic Trachea Midline, No torticollis  Pulmonary: No audible wheezing or respiratory distress   Cardiovascular: No pitting edema, 2+  radial pulse   Abdominal/GI: abdomen non tender, non distended   Skin: see below  Neurovascular: Sensation Intact to the Median, Ulnar, Radial Nerve, Motor Intact to the Median, Ulnar, Radial Nerve, and Pulses Intact  Musculoskeletal: Normal, except as noted in detailed exam and in HPI.      MUSCULOSKELETAL EXAMINATION:  Right small finger:   Skin tear at volar MP crease is closing however there is still some subcutaneous tissue visible  No active bleeding or signs of infection   Full digital ROM       ___________________________________________________    STUDIES REVIEWED:  None       LABS REVIEWED:    HgA1c:   Lab Results   Component Value Date    HGBA1C 5.4 06/10/2025     BMP:   Lab Results   Component Value Date    CALCIUM 9.1 06/10/2025    K 4.5 06/10/2025    CO2 28 06/10/2025     06/10/2025    BUN 17 06/10/2025    CREATININE 0.89 06/10/2025             PROCEDURES PERFORMED:  Procedures  No Procedures performed today    _____________________________________________________             [1]   Past Medical History:  Diagnosis Date    Anxiety     Dupuytren contracture     bilateral    Elevated PSA [id number]    Took PSA test 3 years ago, with a result of 2.7. Took snother tedt approximately June 1, 2023 with trsult of 4.2    Hyperlipidemia     Prostate cancer (HCC)    [2]   Past Surgical History:  Procedure Laterality Date    COLONOSCOPY  2022    COLONOSCOPY W/ POLYPECTOMY  09/23/2004    EYE SURGERY Bilateral November 2020    Cataract and laser on both eyes    HAND SURGERY Bilateral 12/2008    DUPUYTRENS CONTRACTURE SURGICAL RELEASE    INSERTION OF FIDUCIAL MARKER (TRANSRECTAL ULTRASOUND GUIDANCE) N/A 07/25/2024    Procedure: INSERTION OF FIDUCIAL MARKER ,SPACEOAR;  Surgeon: Judson Deluca MD;  Location:  MAIN OR;  Service: Urology    MI BX PROSTATE STRTCTC SATURATION SAMPLING IMG GID N/A 02/23/2024    Procedure: TRANSPERINEAL ULTRASOUND GUIDED BIOPSY PROSTATE;  Surgeon: Melo Chris MD;   [facility];  Service: Urology   [3]   Family History  Problem Relation Name Age of Onset    Coronary artery disease Mother Alma Isadora     Diabetes Mother Alma Isadora     Cirrhosis Father      Diabetes type II Family          multiple relatives    Heart disease Family          ASCVD   [4]   Social History  Tobacco Use    Smoking status: Some Days     Current packs/day: 0.00     Types: Cigars, Cigarettes     Start date:      Last attempt to quit: 1980     Years since quittin.5     Passive exposure: Current    Smokeless tobacco: Never    Tobacco comments:     Cigar once and a while when I play golf; on average I play two or three times a week   Vaping Use    Vaping status: Never Used   Substance Use Topics    Alcohol use: Yes     Alcohol/week: 7.0 standard drinks of alcohol     Types: 7 Standard drinks or equivalent per week     Comment: With dinner    Drug use: Never   [5]   Current Outpatient Medications:     escitalopram (LEXAPRO) 5 mg tablet, Take 1 tablet (5 mg total) by mouth daily, Disp: 90 tablet, Rfl: 0    rosuvastatin (CRESTOR) 20 MG tablet, Take 1 tablet (20 mg total) by mouth daily, Disp: 90 tablet, Rfl: 1  [6] No Known Allergies

## 2025-07-10 NOTE — PROGRESS NOTES
Orthosis    Diagnosis:   1. Dupuytren's contracture of right hand          Indication: Dupuytren's Excision     Location: Right  ring finger and small finger  Supplies: Custom Fit Orthotic  Orthosis type: Xiaflex digit extension splint  Wearing Schedule: Night only  Describe Position: 4th and 5th digit in extension relative to current position to limit stress on skin tear    Precautions: Universal (skin contact/breakdown)    Patient or Caregiver expresses understanding of wearing Schedule and Precautions? Yes  Patient or Caregiver able to don/doff orthotic independently?Yes    Written orders provided to patient? Yes  Orders Obtained: Written  Orders Obtained from: Dr. Dang    Return for evaluation and treatment Yes

## 2025-07-10 NOTE — PROGRESS NOTES
Name: Ezio Muir      : 1950      MRN: [id number]  Encounter Provider: Ibeth Mcmahon PA-C  Encounter Date: 7/10/2025   Encounter department: [facility]  :  Assessment & Plan  Cecal polyp  Patient is a 74-year-old male with history of prostate cancer and bilateral carotid artery stenosis who presents for colonoscopy consult for polypectomy following colonoscopy last month where a 4 to 5 cm cecal polyp was noted.  Biopsies notable for fragments of sessile serrated lesion and separate fragments of tubular adenoma but no high-grade dysplasia or carcinoma seen.  Patient is referred from his outside GI provider to [facility] for polypectomy with an advanced endoscopist for possible ESD versus EMR.    Plan for colonoscopy with advanced proceduralist.  MiraLAX/Dulcolax prep  Discussed risks of procedure including risk of bleeding, infection, perforation, missed polyp, incomplete prep, risk of anesthesia  Not on any blood thinners/anticoagulants  Orders:    Colonoscopy; Future        History of Present Illness   Ezio Muir is a 74 y.o. male with history of bilateral carotid artery stenosis,, iliac aneurysm, BPH, prostate cancer, HLD, ARTEMIO who presents as a new patient for colonoscopy consult for polypectomy.    Patient recently underwent colonoscopy 2025 at an outside facility where a very large sessile carpet-like polyp measuring 4 to 5 cm was seen in the cecum.  EMR technique was attempted but margins were indiscrete and polyp was very large.  Biopsies were taken.  Multiple other polyps removed throughout the colon.  Cecal polyp notable for fragments of sessile serrated lesion and separate fragments of tubular adenoma, no high-grade dysplasia or carcinoma seen.  Patient is referred to us for polypectomy for possible ESD versus EMR.    Patient has history of colon polyps and has undergone colonoscopies every 5 years historically.  No known family history of colon  cancer.    Recent CMP and CBC unremarkable.  Patient recently underwent an executive physical without any significant findings.    Patient denies any GI symptoms.  Denies heartburn/reflux, nausea, vomiting, dysphagia, odynophagia, abdominal pain.  He reports that his bowel movements are daily, formed, regular stools and denies melena or hematochezia.    Patient occasionally takes Excedrin.  He occasionally smokes a cigar and drinks wine a few times per week.    HPI  History obtained from: patient  Review of Systems   HENT:  Negative for sore throat and trouble swallowing.    Gastrointestinal:  Negative for abdominal distention, abdominal pain, anal bleeding, blood in stool, constipation, diarrhea, nausea and vomiting.    A complete review of systems is negative other than that noted above in the HPI.      Current Medications[1]  Objective   There were no vitals taken for this visit.    Physical Exam  Vitals and nursing note reviewed.   Constitutional:       General: He is not in acute distress.     Appearance: He is well-developed.   HENT:      Head: Normocephalic and atraumatic.     Eyes:      Conjunctiva/sclera: Conjunctivae normal.       Cardiovascular:      Rate and Rhythm: Normal rate and regular rhythm.      Heart sounds: No murmur heard.  Pulmonary:      Effort: Pulmonary effort is normal. No respiratory distress.      Breath sounds: Normal breath sounds.   Abdominal:      General: Abdomen is flat. Bowel sounds are normal. There is no distension.      Palpations: Abdomen is soft.      Tenderness: There is no abdominal tenderness. There is no guarding.     Musculoskeletal:         General: No swelling.      Cervical back: Neck supple.     Skin:     General: Skin is warm and dry.      Capillary Refill: Capillary refill takes less than 2 seconds.     Neurological:      General: No focal deficit present.      Mental Status: He is alert.     Psychiatric:         Mood and Affect: Mood normal.        Lab Results: I  personally reviewed relevant lab results. CBC/BMP: No new results in last 24 hours. , LFTs: No new results in last 24 hours.                    [1]   Current Outpatient Medications   Medication Sig Dispense Refill    escitalopram (LEXAPRO) 5 mg tablet Take 1 tablet (5 mg total) by mouth daily 90 tablet 0    rosuvastatin (CRESTOR) 20 MG tablet Take 1 tablet (20 mg total) by mouth daily 90 tablet 1     No current facility-administered medications for this visit.

## 2025-07-11 ENCOUNTER — APPOINTMENT (OUTPATIENT)
Dept: LAB | Age: 75
End: 2025-07-11
Payer: MEDICARE

## 2025-07-11 DIAGNOSIS — C61 PROSTATE CANCER (HCC): ICD-10-CM

## 2025-07-11 LAB — PSA SERPL-MCNC: 0.8 NG/ML (ref 0–4)

## 2025-07-11 PROCEDURE — 36415 COLL VENOUS BLD VENIPUNCTURE: CPT

## 2025-07-11 PROCEDURE — 84153 ASSAY OF PSA TOTAL: CPT

## 2025-07-11 RX ORDER — ESCITALOPRAM OXALATE 5 MG/1
5 TABLET ORAL DAILY
Qty: 90 TABLET | Refills: 1 | Status: SHIPPED | OUTPATIENT
Start: 2025-07-11

## 2025-07-15 DIAGNOSIS — K63.5 CECAL POLYP: Primary | ICD-10-CM

## 2025-07-17 ENCOUNTER — OFFICE VISIT (OUTPATIENT)
Dept: OCCUPATIONAL THERAPY | Facility: CLINIC | Age: 75
End: 2025-07-17
Payer: MEDICARE

## 2025-07-17 VITALS — BODY MASS INDEX: 29.78 KG/M2 | HEIGHT: 70 IN | WEIGHT: 208 LBS

## 2025-07-17 DIAGNOSIS — M72.0 DUPUYTREN'S CONTRACTURE OF RIGHT HAND: Primary | ICD-10-CM

## 2025-07-17 PROCEDURE — 99213 OFFICE O/P EST LOW 20 MIN: CPT | Performed by: SURGERY

## 2025-07-17 NOTE — PROGRESS NOTES
ASSESSMENT/PLAN:      Assessment & Plan  Dupuytren's contracture of right hand  The skin tear is healing well. There is no erythema or signs of infection. The patient will continue night time bracing and he will have his brace adjusted today. He may follow up with me as needed.             The patient verbalized understanding of exam findings and treatment plan. We engaged in the shared decision-making process and treatment options were discussed at length with the patient. Surgical and conservative management discussed today along with risks and benefits.    Diagnoses and all orders for this visit:    Dupuytren's contracture of right hand          Follow Up:  Return if symptoms worsen or fail to improve.      To Do Next Visit:       ____________________________________________________________________________________________________________________________________________      CHIEF COMPLAINT:  Chief Complaint   Patient presents with    Follow-up     Follow up of the right hand.        SUBJECTIVE:  Ezio Muir is a 74 y.o. year old RHD male who presents to the office today for follow up right small finger Dupuytren's contracture s/p Xiaflex. The patient did sustain a skin tear which is healing well. He has been compliant with night time bracing. He denies any pain.       I have personally reviewed all the relevant PMH, PSH, SH, FH, Medications and allergies.     PAST MEDICAL HISTORY:  Past Medical History[1]    PAST SURGICAL HISTORY:  Past Surgical History[2]    FAMILY HISTORY:  Family History[3]    SOCIAL HISTORY:  Social History[4]    MEDICATIONS:  Current Medications[5]    ALLERGIES:  Allergies[6]    REVIEW OF SYSTEMS:  Review of Systems   Constitutional:  Negative for chills and fever.   HENT:  Negative for drooling and sneezing.    Eyes:  Negative for redness.   Respiratory:  Negative for cough and wheezing.    Gastrointestinal:  Negative for nausea and vomiting.   Musculoskeletal:  Negative for arthralgias,  joint swelling and myalgias.   Neurological:  Negative for weakness and numbness.   Psychiatric/Behavioral:  Negative for behavioral problems. The patient is not nervous/anxious.        VITALS:  There were no vitals filed for this visit.      _____________________________________________________  PHYSICAL EXAMINATION:  General: well developed and well nourished, alert, oriented times 3, and appears comfortable  Psychiatric: Normal  HEENT: Normocephalic, Atraumatic Trachea Midline, No torticollis  Pulmonary: No audible wheezing or respiratory distress   Cardiovascular: No pitting edema, 2+ radial pulse   Abdominal/GI: abdomen non tender, non distended   Skin: No masses, erythema, lacerations, fluctation, ulcerations  Neurovascular: Sensation Intact to the Median, Ulnar, Radial Nerve, Motor Intact to the Median, Ulnar, Radial Nerve, and Pulses Intact  Musculoskeletal: Normal, except as noted in detailed exam and in HPI.      MUSCULOSKELETAL EXAMINATION:  Right hand  Skin tear healing well  No erythema or signs of infection    ___________________________________________________    STUDIES REVIEWED:  No new images reviewed in the office today.    LABS REVIEWED:    HgA1c:   Lab Results   Component Value Date    HGBA1C 5.4 06/10/2025     BMP:   Lab Results   Component Value Date    CALCIUM 9.1 06/10/2025    K 4.5 06/10/2025    CO2 28 06/10/2025     06/10/2025    BUN 17 06/10/2025    CREATININE 0.89 06/10/2025             PROCEDURES PERFORMED:  Procedures  No Procedures performed today    _____________________________________________________      Scribe Attestation      I,:  Harmony Mendoza MA am acting as a scribe while in the presence of the attending physician.:       I,:  Josie Dang MD personally performed the services described in this documentation    as scribed in my presence.:                        [1]   Past Medical History:  Diagnosis Date    Anxiety     Dupuytren contracture     bilateral     Elevated PSA     Took PSA test 3 years ago, with a result of 2.7. Took snother tedt approximately 2023 with trsult of 4.2    Hyperlipidemia     Prostate cancer (HCC)    [2]   Past Surgical History:  Procedure Laterality Date    COLONOSCOPY      COLONOSCOPY W/ POLYPECTOMY  2004    EYE SURGERY Bilateral 2020    Cataract and laser on both eyes    HAND SURGERY Bilateral 2008    DUPUYTRENS CONTRACTURE SURGICAL RELEASE    INSERTION OF FIDUCIAL MARKER (TRANSRECTAL ULTRASOUND GUIDANCE) N/A 2024    Procedure: INSERTION OF FIDUCIAL MARKER ,SPACEOAR;  Surgeon: Judson Deluca MD;  Location:  MAIN OR;  Service: Urology    IA BX PROSTATE STRTCTC SATURATION SAMPLING IMG GID N/A 2024    Procedure: TRANSPERINEAL ULTRASOUND GUIDED BIOPSY PROSTATE;  Surgeon: Melo Chris MD;  Location: [facility];  Service: Urology   [3]   Family History  Problem Relation Name Age of Onset    Coronary artery disease Mother Alma Isadora     Diabetes Mother Alma Isadora     Cirrhosis Father      Diabetes type II Family          multiple relatives    Heart disease Family          ASCVD   [4]   Social History  Tobacco Use    Smoking status: Some Days     Current packs/day: 0.00     Types: Cigars, Cigarettes     Start date:      Last attempt to quit: 1980     Years since quittin.5     Passive exposure: Current    Smokeless tobacco: Never    Tobacco comments:     Cigar once and a while when I play golf; on average I play two or three times a week   Vaping Use    Vaping status: Never Used   Substance Use Topics    Alcohol use: Yes     Alcohol/week: 7.0 standard drinks of alcohol     Types: 7 Standard drinks or equivalent per week     Comment: With dinner    Drug use: Never   [5]   Current Outpatient Medications:     escitalopram (LEXAPRO) 5 mg tablet, Take 1 tablet (5 mg total) by mouth daily, Disp: 90 tablet, Rfl: 1    rosuvastatin (CRESTOR) 20 MG tablet, Take 1 tablet (20 mg total)  by mouth daily, Disp: 90 tablet, Rfl: 1  [6] No Known Allergies

## 2025-07-22 ENCOUNTER — TELEPHONE (OUTPATIENT)
Age: 75
End: 2025-07-22

## 2025-07-22 DIAGNOSIS — K63.5 CECAL POLYP: Primary | ICD-10-CM

## 2025-07-22 NOTE — TELEPHONE ENCOUNTER
Patients GI provider:  DONIS Mcmahon    Number to return call: [phone]    Reason for call: Pt calling stating that he called [facility] as we recommended him to do so to schedule procedure Dr. Heard could not perform, Endoscopic submucosal dissection. Per pt he called the phone # we gave him [phone] Dr. Jose Smallwood and they informed him that [facility] need to connect the chart with them so that they can see what it is pt needs to be scheduled for. Or they need referral or order. Pt wasn't sure exactly what. Pt would like this taken care of so that he can schedule ESD w/ them please. Please reach out to pt on this, thank you.    Scheduled procedure/appointment date if applicable: N/A

## (undated) DEVICE — MAX-CORE® DISPOSABLE CORE BIOPSY INSTRUMENT, 18G X 25CM: Brand: MAX-CORE

## (undated) DEVICE — CHLORAPREP HI-LITE 26ML ORANGE

## (undated) DEVICE — PROBE ULTRASOUND URONAV TRIPLANE

## (undated) DEVICE — 3M™ IOBAN™ 2 ANTIMICROBIAL INCISE DRAPE 6648EZ: Brand: IOBAN™ 2

## (undated) DEVICE — SPONGE 4 X 4 XRAY 16 PLY STRL LF RFD

## (undated) DEVICE — SYRINGE 20ML LL

## (undated) DEVICE — NEEDLE 25G X 1 1/2

## (undated) DEVICE — CHLORAPREP HI-LITE 10.5ML ORANGE

## (undated) DEVICE — TABLE COVER: Brand: CONVERTORS

## (undated) DEVICE — PREP PAD BNS: Brand: CONVERTORS

## (undated) DEVICE — GLOVE SRG BIOGEL ORTHOPEDIC 7.5

## (undated) DEVICE — SYRINGE 10ML LL

## (undated) DEVICE — SPECIMEN CONTAINER STERILE PEEL PACK

## (undated) DEVICE — RAZOR STERILE

## (undated) DEVICE — SYSTEM TRANSPERINEAL ACCESS PRECISIONPOINT

## (undated) DEVICE — SKIN MARKER DUAL TIP WITH RULER CAP, FLEXIBLE RULER AND LABELS: Brand: DEVON

## (undated) DEVICE — 3M™ IOBAN™ 2 ANTIMICROBIAL INCISE DRAPE 6650EZ: Brand: IOBAN™ 2

## (undated) DEVICE — SCD SEQUENTIAL COMPRESSION COMFORT SLEEVE MEDIUM KNEE LENGTH: Brand: KENDALL SCD

## (undated) DEVICE — LUBRICANT JELLY SURGILUBE TUBE 2OZ FLIP TOP

## (undated) DEVICE — NEEDLE SPINAL 22G X 3.5IN  QUINCKE

## (undated) DEVICE — NEEDLE BLUNT 18 G X 1 1/2IN

## (undated) DEVICE — GAUZE SPONGES,16 PLY: Brand: CURITY

## (undated) DEVICE — UTILITY MARKER,BLACK WITH LABELS: Brand: DEVON

## (undated) DEVICE — SURGICAL CLIPPER BLADE GENERAL USE

## (undated) DEVICE — MAYO STAND COVER: Brand: CONVERTORS

## (undated) DEVICE — TELFA NON-ADHERENT ABSORBENT DRESSING: Brand: TELFA

## (undated) DEVICE — SYRINGE 30ML LL

## (undated) DEVICE — STERILE POLYISOPRENE POWDER-FREE SURGICAL GLOVES: Brand: PROTEXIS